# Patient Record
Sex: FEMALE | Race: WHITE | Employment: FULL TIME | URBAN - METROPOLITAN AREA
[De-identification: names, ages, dates, MRNs, and addresses within clinical notes are randomized per-mention and may not be internally consistent; named-entity substitution may affect disease eponyms.]

---

## 2017-04-11 DIAGNOSIS — F41.9 ANXIETY: ICD-10-CM

## 2017-04-11 RX ORDER — FLUOXETINE 20 MG/1
40 TABLET ORAL DAILY
Qty: 180 TAB | Refills: 3 | Status: SHIPPED | OUTPATIENT
Start: 2017-04-11 | End: 2018-11-08

## 2017-05-01 DIAGNOSIS — F41.9 ANXIETY: ICD-10-CM

## 2017-05-02 RX ORDER — LORAZEPAM 0.5 MG/1
TABLET ORAL
Qty: 20 TAB | Refills: 0 | OUTPATIENT
Start: 2017-05-02 | End: 2018-02-22 | Stop reason: ALTCHOICE

## 2017-05-02 NOTE — TELEPHONE ENCOUNTER
From: Huan Mo  To: Rachna Marks MD  Sent: 5/1/2017 10:09 PM EDT  Subject: Medication Renewal Request    Original authorizing provider: MD Huan Aguilar would like a refill of the following medications:  LORazepam (ATIVAN) 0.5 mg tablet Rachna Marks MD]    Preferred pharmacy: [facility]/PHARMACY #[id number]- Jade Case, [address]:

## 2017-05-03 NOTE — TELEPHONE ENCOUNTER
Verbal order per Dr. Marko Fry for calling in medications   Requested Prescriptions     Signed Prescriptions Disp Refills    LORazepam (ATIVAN) 0.5 mg tablet 20 Tab 0     Sig: Take 1/2 to 1 tablet po bid only as needed     Authorizing Provider: Samantha Reyes         Phone in.

## 2018-02-19 ENCOUNTER — TELEPHONE (OUTPATIENT)
Dept: INTERNAL MEDICINE CLINIC | Age: 23
End: 2018-02-19

## 2018-02-19 NOTE — TELEPHONE ENCOUNTER
Patient would like to speak with the nurse about some forms that need to be completed. The forms are Confirming PCP is informed patient is in a rehab facility and  PCP has the contract. Patient states forms must be signed by the 24rd. Patient was last seen 11/2016  So im not sure if an apt is required or not.  Patient can be reached at [phone]

## 2018-02-22 ENCOUNTER — OFFICE VISIT (OUTPATIENT)
Dept: INTERNAL MEDICINE CLINIC | Age: 23
End: 2018-02-22

## 2018-02-22 VITALS
OXYGEN SATURATION: 94 % | DIASTOLIC BLOOD PRESSURE: 79 MMHG | WEIGHT: 192.5 LBS | HEIGHT: 65 IN | TEMPERATURE: 99.4 F | RESPIRATION RATE: 18 BRPM | SYSTOLIC BLOOD PRESSURE: 112 MMHG | HEART RATE: 95 BPM | BODY MASS INDEX: 32.07 KG/M2

## 2018-02-22 DIAGNOSIS — Z91.030 BEE STING ALLERGY: Primary | ICD-10-CM

## 2018-02-22 RX ORDER — DICYCLOMINE HYDROCHLORIDE 10 MG/1
10 CAPSULE ORAL AS NEEDED
COMMUNITY
End: 2020-01-16 | Stop reason: ALTCHOICE

## 2018-02-22 RX ORDER — FLUOXETINE HYDROCHLORIDE 40 MG/1
CAPSULE ORAL
Refills: 1 | COMMUNITY
Start: 2018-01-23 | End: 2018-02-22 | Stop reason: DRUGHIGH

## 2018-02-22 RX ORDER — ELUXADOLINE 75 MG/1
TABLET, FILM COATED ORAL
Refills: 3 | COMMUNITY
Start: 2017-12-04 | End: 2018-02-22 | Stop reason: DRUGHIGH

## 2018-02-22 RX ORDER — EPINEPHRINE 0.3 MG/.3ML
0.3 INJECTION SUBCUTANEOUS
Qty: 2 SYRINGE | Refills: 1 | Status: SHIPPED | OUTPATIENT
Start: 2018-02-22 | End: 2018-11-13 | Stop reason: ALTCHOICE

## 2018-02-22 RX ORDER — ELUXADOLINE 100 MG/1
TABLET, FILM COATED ORAL
Refills: 3 | COMMUNITY
Start: 2017-12-19 | End: 2018-11-13 | Stop reason: ALTCHOICE

## 2018-02-22 NOTE — PROGRESS NOTES
Chief Complaint   Patient presents with    Medication Evaluation     Pt presnets to get refill on epipen. She has a bee sting allergy. Since last visit she became involved with etoh and opioids. She is working with [facility]  She is very busy with her groups and counseling. Psychiatry  Followed by  dr. Christy Landry an dis taking 60 mg prozac. She sees him monthly. Therapist David nunn followes weekly  iop group therapy  Drug tested randomly  AA groups are also mandatory    November 30  HPMP    Not taking ativan  Not taking psueuophedrine      IBS   She is now on Vibrerzze for Ibs with diarrhea. No se  Kristy Siu NP from gasroenterology sees her  Bentyl as needed        Past Medical History:   Diagnosis Date    Anxiety 2010    Bee sting allergy     severe. [facility]br[facility] at [facility]: kidney disease 11/24/2012    Lyme disease     Musculoskeletal disorder 2009    Dislocated Rt shoulder    Recurrent UTI     hx pyelo. saw urology.   took macrodantin age 16 3 year     Past Surgical History:   Procedure Laterality Date    HX TONSILLECTOMY  2002    SHOULDER SURG [address] UNLISTED  12/2010    torn labrum and tendon repair/right     Social History     Social History    Marital status: SINGLE     Spouse name: N/A    Number of children: N/A    Years of education: N/A     Occupational History    bon 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  EPINEPHrine (EPIPEN 2-NAVEEN) 0.3 mg/0.3 mL injection 0.3 mL by IntraMUSCular route once as needed for up to 1 dose. 2 Syringe 1    FLUoxetine (PROZAC) 20 mg tablet Take 2 Tabs by mouth daily. Indications: GENERALIZED ANXIETY DISORDER (Patient taking differently: Take 60 mg by mouth daily. Indications: Generalized Anxiety Disorder) 180 Tab 3    JUNEL FE 1.5/30, 28, 1.5 mg-30 mcg (21)/75 mg (7) tab TAKE 1 TABLET BY MOUTH EVERY DAY  3     Allergies   Allergen Reactions    Bee Sting Kit Anaphylaxis     And wasps    Cefepime Hives and Swelling    Cephalosporins Anaphylaxis    Ciprofloxacin Anaphylaxis    Rocephin [Ceftriaxone] Hives    Zithromax [Azithromycin] Hives       Review of Systems - General ROS: negative for - chills or fever  Cardiovascular ROS: no chest pain or dyspnea on exertion  Respiratory ROS: no cough, shortness of breath, or wheezing    Visit Vitals    /79 (BP 1 Location: Left arm, BP Patient Position: Sitting)    Pulse 95    Temp 99.4 °F (37.4 °C) (Oral)    Resp 18    Ht 5' 5\" (1.651 m)    Wt 192 lb 8 oz (87.3 kg)    LMP 02/22/2018    SpO2 94%    BMI 32.03 kg/m2     General Appearance:  Well developed, well nourished,alert and oriented x 3, and individual in no acute distress. Ears/Nose/Mouth/Throat:   Hearing grossly normal.         Neck: Supple, no lad, no bruits   Chest:   Lungs clear to auscultation bilaterally. Cardiovascular:  Regular rate and rhythm, S1, S2 normal, no murmur. Abdomen:   Soft, non-tender, bowel sounds are active. Extremities: No edema bilaterally. Skin: Warm and dry, no suspicious lesions                 Diagnoses and all orders for this visit:    1.  Bee sting allergy  refill  -     EPINEPHrine (EPIPEN 2-NAVEEN) 0.3 mg/0.3 mL injection; 0.3 mL by IntraMUSCular route once as needed for up to 1 dose. Pt is particpating in the HPMP  Forms signed and her contract will be scanned to media  Support provided. This note will not be viewable in [address]

## 2018-02-22 NOTE — MR AVS SNAPSHOT
[id number] 
[phone] Patient: Rose Powell MRN: [id number] ONK:[birth year] Visit Information Date & Time Provider Department Dept. Phone Encounter #  
 2/22/2018  2:30 PM Marbin Damon MD [facility] [address] [facility] [id number] Upcoming Health Maintenance Date Due Influenza Age 5 to Adult 8/1/2017 PAP AKA CERVICAL CYTOLOGY 1/1/2018 DTaP/Tdap/Td series (8 - Td) 11/29/2026 Allergies as of 2/22/2018  Review Complete On: 2/22/2018 By: Marbin Damon MD  
  
 Severity Noted Reaction Type Reactions Bee Sting Kit High 11/21/2012    Anaphylaxis And wasps Cefepime High 12/03/2010   Systemic Hives, Swelling Cephalosporins High 11/21/2012    Anaphylaxis Ciprofloxacin High 07/10/2011   Systemic Anaphylaxis Rocephin [Ceftriaxone]  05/14/2012    Hives Zithromax [Azithromycin]  11/14/2015    Hives Current Immunizations  Reviewed on 11/29/2016 Name Date DTaP 5/7/1998, 10/31/1995, 1994, 1994, 1994 HPV 1/19/2009, 9/19/2008, 7/16/2008 Hep A Vaccine 1/19/2009, 7/16/2008 Hep B Vaccine 1994, 1994, 1994 Influenza High Dose Vaccine PF 10/16/2014 Influenza Vaccine 10/1/2017, 10/30/2016 Influenza Vaccine Whole 12/3/2012 MMR 5/7/1998, 8/9/1995 Meningococcal (MCV4) Vaccine 5/31/2012 Meningococcal ACWY Vaccine 3/1/2006 Poliovirus vaccine 5/7/1998, 10/31/1995, 1994, 1994 TB Skin Test (PPD) Intradermal 6/2/2014, 5/21/2013 Tdap 11/29/2016, 3/1/2006 Not reviewed this visit You Were Diagnosed With   
  
 Codes Comments Bee sting allergy    -  Primary ICD-10-CM: Z91.038 
ICD-9-CM: V15.06 Vitals BP Pulse Temp Resp Height(growth percentile) Weight(growth percentile)  112/79 (BP 1 Location: Left arm, BP Patient Position: Sitting) 95 99.4 °F (37.4 °C) (Oral) 18 5' 5\" (1.651 m) 192 lb 8 oz (87.3 kg) LMP SpO2 BMI OB Status Smoking Status 2018 94% 32.03 kg/m2 Having regular periods Never Smoker Vitals History BMI and BSA Data Body Mass Index Body Surface Area 32.03 kg/m 2 2 m 2 Preferred Pharmacy Pharmacy Name Phone [facility]/PHARMACY #[id number]- [address] , [address] [place] [address]  [phone] Your Updated Medication List  
  
   
This list is accurate as of 18  3:43 PM.  Always use your most recent med list.  
  
  
  
  
 BENTYL 10 mg capsule Generic drug:  dicyclomine Take 10 mg by mouth daily as needed. EPINEPHrine 0.3 mg/0.3 mL injection Commonly known as:  EPIPEN 2-NAVEEN  
0.3 mL by IntraMUSCular route once as needed for up to 1 dose. FLUoxetine 20 mg tablet Commonly known as:  PROzac Take 2 Tabs by mouth daily. Indications: GENERALIZED ANXIETY DISORDER JUNEL FE 1.5/30 (28) 1.5 mg-30 mcg (21)/75 mg (7) Tab Generic drug:  norethindrone-ethinyl estradiol-iron TAKE 1 TABLET BY MOUTH EVERY DAY  
  
 VIBERZI 100 mg tablet Generic drug:  eluxadoline TAKE 1 TABLET BY MOUTH TWICE A DAY Prescriptions Sent to Pharmacy Refills EPINEPHrine (EPIPEN 2-NAVEEN) 0.3 mg/0.3 mL injection 1 Si.3 mL by IntraMUSCular route once as needed for up to 1 dose. Class: Normal  
 Pharmacy: [facility]/pharmacy #[id number]- [id number] , [address] [place] [address]  Ph #: [phone] Route: IntraMUSCular Introducing [facility]! Dear Jessy Travis: Thank you for requesting a E[employer] account. Our records indicate that you already have an active Efield account. You can access your account anytime at [email or web address] Did you know that you can access your hospital and ER discharge instructions at any time in Efield? You can also review all of your test results from your hospital stay or ER visit. Additional Information If you have questions, please visit the Frequently Asked Questions section of the [employer] website at [email or web address] Remember, [employer] is NOT to be used for urgent needs. For medical emergencies, dial 911. Now available from your iPhone and Android! Please provide this summary of care documentation to your next provider. Your primary care clinician is listed as Olivia Simon. If you have any questions after today's visit, please call [phone].

## 2018-02-24 ENCOUNTER — APPOINTMENT (OUTPATIENT)
Dept: GENERAL RADIOLOGY | Age: 23
End: 2018-02-24
Attending: EMERGENCY MEDICINE
Payer: COMMERCIAL

## 2018-02-24 ENCOUNTER — HOSPITAL ENCOUNTER (EMERGENCY)
Age: 23
Discharge: HOME | End: 2018-02-24
Attending: EMERGENCY MEDICINE
Payer: COMMERCIAL

## 2018-02-24 VITALS
HEIGHT: 64 IN | RESPIRATION RATE: 16 BRPM | SYSTOLIC BLOOD PRESSURE: 117 MMHG | WEIGHT: 190 LBS | HEART RATE: 88 BPM | DIASTOLIC BLOOD PRESSURE: 69 MMHG | BODY MASS INDEX: 32.44 KG/M2 | OXYGEN SATURATION: 98 % | TEMPERATURE: 98.3 F

## 2018-02-24 DIAGNOSIS — M25.522 LEFT ELBOW PAIN: Primary | ICD-10-CM

## 2018-02-24 PROCEDURE — A4565 SLINGS: HCPCS

## 2018-02-24 PROCEDURE — 73080 X-RAY EXAM OF ELBOW: CPT

## 2018-02-24 PROCEDURE — 77030028224 HC PDNG CST BSNM -A

## 2018-02-24 PROCEDURE — 74011250637 HC RX REV CODE- 250/637: Performed by: EMERGENCY MEDICINE

## 2018-02-24 PROCEDURE — 75810000053 HC SPLINT APPLICATION

## 2018-02-24 PROCEDURE — 99283 EMERGENCY DEPT VISIT LOW MDM: CPT

## 2018-02-24 RX ORDER — IBUPROFEN 600 MG/1
600 TABLET ORAL
Status: COMPLETED | OUTPATIENT
Start: 2018-02-24 | End: 2018-02-24

## 2018-02-24 RX ORDER — IBUPROFEN 600 MG/1
600 TABLET ORAL
Qty: 30 TAB | Refills: 0 | Status: SHIPPED | OUTPATIENT
Start: 2018-02-24 | End: 2019-05-10

## 2018-02-24 RX ORDER — NORETHINDRONE ACETATE AND ETHINYL ESTRADIOL .03; 1.5 MG/1; MG/1
1 TABLET ORAL
COMMUNITY
End: 2019-09-25 | Stop reason: ALTCHOICE

## 2018-02-24 RX ADMIN — IBUPROFEN 600 MG: 600 TABLET, FILM COATED ORAL at 12:43

## 2018-02-24 NOTE — DISCHARGE INSTRUCTIONS
Musculoskeletal Pain: Care Instructions  Your Care Instructions    Different problems with the bones, muscles, nerves, ligaments, and tendons in the body can cause pain. One or more areas of your body may ache or burn. Or they may feel tired, stiff, or sore. The medical term for this type of pain is musculoskeletal pain. It can have many different causes. Sometimes the pain is caused by an injury such as a strain or sprain. Or you might have pain from using one part of your body in the same way over and over again. This is called overuse. In some cases, the cause of the pain is another health problem such as arthritis or fibromyalgia. The doctor will examine you and ask you questions about your health to help find the cause of your pain. Blood tests or imaging tests like an X-ray may also be helpful. But sometimes doctors can't find a cause of the pain. Treatment depends on your symptoms and the cause of the pain, if known. The doctor has checked you carefully, but problems can develop later. If you notice any problems or new symptoms, get medical treatment right away. Follow-up care is a key part of your treatment and safety. Be sure to make and go to all appointments, and call your doctor if you are having problems. It's also a good idea to know your test results and keep a list of the medicines you take. How can you care for yourself at home? · Rest until you feel better. · Do not do anything that makes the pain worse. Return to exercise gradually if you feel better and your doctor says it's okay. · Be safe with medicines. Read and follow all instructions on the label. ¨ If the doctor gave you a prescription medicine for pain, take it as prescribed. ¨ If you are not taking a prescription pain medicine, ask your doctor if you can take an over-the-counter medicine. · Put ice or a cold pack on the area for 10 to 20 minutes at a time to ease pain.  Put a thin cloth between the ice and your skin.  When should you call for help? Call your doctor now or seek immediate medical care if:  ? · You have new pain, or your pain gets worse. ? · You have new symptoms such as a fever, a rash, or chills. ? Watch closely for changes in your health, and be sure to contact your doctor if:  ? · You do not get better as expected. Where can you learn more? Go to http://jaqui-anna.info/. Enter G626 in the search box to learn more about \"Musculoskeletal Pain: Care Instructions. \"  Current as of: October 14, 2016  Content Version: 11.4  © 1773-6200 [employer]. Care instructions adapted under license by [employer] (which disclaims liability or warranty for this information). If you have questions about a medical condition or this instruction, always ask your healthcare professional. [place] [address] any warranty or liability for your use of this information. We hope that we have addressed all of your medical concerns. The examination and treatment you received in the Emergency Department were for an emergent problem and were not intended as complete care. It is important that you follow up with your healthcare provider(s) for ongoing care. If your symptoms worsen or do not improve as expected, and you are unable to reach your usual health care provider(s), you should return to the Emergency Department. Today's healthcare is undergoing tremendous change, and patient satisfaction surveys are one of the many tools to assess the quality of medical care. You may receive a survey from the [employer] organization regarding your experience in the Emergency Department. I hope that your experience has been completely positive, particularly the medical care that I provided. As such, please participate in the survey; anything less than excellent does not meet my expectations or intentions.         [facility] and [facility] participate in nationally recognized quality of care measures. If your blood pressure is greater than 120/80, as reported below, we urge that you seek medical care to address the potential of high blood pressure, commonly known as hypertension. Hypertension can be hereditary or can be caused by certain medical conditions, pain, stress, or \"white coat syndrome. \"       Please make an appointment with your health care provider(s) for follow up of your Emergency Department visit. VITALS:   Patient Vitals for the past 8 hrs:   Temp Pulse Resp BP SpO2   02/24/18 1138 98.3 °F (36.8 °C) (!) 122 16 134/86 100 %          Thank you for allowing us to provide you with medical care today. We realize that you have many choices for your emergency care needs. Please choose us in the future for any continued health care needs. [facility], [address]: [phone]            No results found for this or any previous visit (from the past 24 hour(s)). Xr Elbow Lt Min 3 V    Result Date: 2/24/2018  EXAM:  XR ELBOW LT MIN 3 V INDICATION:   fall. COMPARISON: None. FINDINGS: Three views of the left elbow demonstrate no fracture, dislocation, effusion or other acute abnormality. IMPRESSION: No acute abnormality.

## 2018-02-24 NOTE — ED TRIAGE NOTES
Pt fell off a horse about 25 min ago, injuring left elbow. Pt hit head and had a helmet on, no crack in helmet. No LOC. Hx of previously broken left elbow in high school.  Pt is 3 months sober and requests no opioids

## 2018-02-24 NOTE — ED PROVIDER NOTES
Patient is a 20 y.o. female presenting with elbow pain. Elbow Pain    Associated symptoms include numbness. Pertinent negatives include no neck pain. 19 yo WF presents after fall from horse about 30 min ago. She was on jump when the horse slipped and she fell off. States she hit her head, no LOC, had a helmet on. Landed on left elbow. Pt is right handed. Felt ok, then got back on horse and elbow with worsening pain, denies weakness. C/o mild tingling to left hand. Hx of left elbow fx. Hx of opoid abuse. Past Medical History:   Diagnosis Date    Anxiety 2010    Bee sting allergy     [facility]. [facility] Herbert Sprague [facility]: kidney disease 11/24/2012    Lyme disease     Musculoskeletal disorder 2009    Dislocated Rt shoulder    Recurrent UTI     hx pyelo. saw urology. took macrodantin age [age] year       Past Surgical History:   Procedure Laterality Date    HX TONSILLECTOMY  2002    SHOULDER SURG PROC UNLISTED Right 12/2010    torn labrum and tendon repair/right         Family History:   Problem Relation Age of Onset    Anxiety Mother     Kidney Disease Paternal Grandmother      also in cousins   [place] Cancer Maternal Grandmother      skin       Social History     Social History    Marital status: SINGLE     Spouse name: N/A    Number of children: N/A    Years of education: N/A     Occupational History    bon [employer]      Social History Main Topics    Smoking status: Never Smoker    Smokeless tobacco: Never Used    Alcohol use No      Comment: no ETOH since 12/1/2017    Drug use: Yes     Special: Opiates      Comment: sober since 12/1/2017    Sexual activity: Yes     Birth control/ protection: Pill     Other Topics Concern    Not on file     Social History Narrative    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 Respiratory: Negative for cough and shortness of breath. Cardiovascular: Negative for chest pain. Gastrointestinal: Negative for diarrhea, nausea and vomiting. Musculoskeletal: Positive for arthralgias. Negative for neck pain and neck stiffness. Skin: Negative for rash and wound. Neurological: Positive for numbness. Negative for syncope, weakness and headaches. All other systems reviewed and are negative. There were no vitals filed for this visit.          Physical Exam   Physical Examination: General appearance - alert, well appearing, and in no distress, oriented to person, place, and time and normal appearing weight  Eyes - pupils equal and reactive, extraocular eye movements intact  Neck - supple, no significant adenopathy  Chest - clear to auscultation, no wheezes, rales or rhonchi, symmetric air entry  Heart - normal rate, regular rhythm, normal S1, S2, no murmurs, rubs, clicks or gallops  Abdomen - soft, nontender, nondistended, no masses or organomegaly  Back exam - full range of motion, no tenderness, palpable spasm or pain on motion  Neurological - alert, oriented, normal speech, no focal findings or movement disorder noted  Musculoskeletal - no deformity to left elbow, tenderness to palpation to posterior left elbow, limited rom due to pain, NVI distally with strong radial pulses, scars to left AC from previous drug use (pt admits to this)  Extremities - peripheral pulses normal, no pedal edema, no clubbing or cyanosis  Skin - normal coloration and turgor, no rashes, no suspicious skin lesions noted  MDM  Number of Diagnoses or Management Options  Left elbow pain:      Amount and/or Complexity of Data Reviewed  Tests in the radiology section of CPT®: ordered and reviewed  Decide to obtain previous medical records or to obtain history from someone other than the patient: yes  Obtain history from someone other than the patient: yes (mother)  Review and summarize past medical records: yes  Independent visualization of images, tracings, or specimens: yes    Patient Progress  Patient progress: stable        ED Course       Procedures  No fx on xray. Pt still with pain. Posterior long arm splint applied by nursing. NVI after placement. F/u with ortho or return to ED for worsening symptoms.

## 2018-10-16 ENCOUNTER — OFFICE VISIT (OUTPATIENT)
Dept: INTERNAL MEDICINE CLINIC | Age: 23
End: 2018-10-16

## 2018-10-16 DIAGNOSIS — F41.9 ANXIETY: ICD-10-CM

## 2018-10-16 DIAGNOSIS — F33.2 SEVERE EPISODE OF RECURRENT MAJOR DEPRESSIVE DISORDER, WITHOUT PSYCHOTIC FEATURES (HCC): Primary | ICD-10-CM

## 2018-10-16 RX ORDER — BUSPIRONE HYDROCHLORIDE 5 MG/1
5 TABLET ORAL
Qty: 90 TAB | Refills: 0 | Status: SHIPPED | OUTPATIENT
Start: 2018-10-16 | End: 2018-10-29

## 2018-10-16 NOTE — PROGRESS NOTES
All health maintenance and other pertinent information has been reviewed in preparation for today's office visit. Patient presents in the office today for: Chief Complaint Patient presents with  Hypertension  Anxiety 1. Have you been to the ER, urgent care clinic since your last visit? Hospitalized since your last visit? No 
 
2. Have you seen or consulted any other health care providers outside of the [address] [place] [address] since your last visit? Include any pap smears or colon screening.  No

## 2018-10-16 NOTE — PROGRESS NOTES
Chief Complaint Patient presents with  Hypertension  Anxiety Depression Patient complains of depression. She complains of depressed mood, anhedonia, weight gain, hypersomnia, psychomotor retardation, fatigue, feelings of worthlessness/guilt, difficulty concentrating, hopelessness, impaired memory, recurrent thoughts of death and suicidal thoughts without plan. Onset was approximately several months ago, gradually worsening since that time. He denies current suicidal and homicidal plan or intent. Family history significant for nothing. Possible organic causes contributing are: None. Risk factors: negative life event patient has a previous history of opioid use related to her prior job. She went to [facility] and turned herself in. She has been on a treatment plan but feels there is an emphasis on substance abuse but not addressing depression and anxiety. She was given an opportunity to work at [employer] however she started to take IV Benadryl because her depression was still there. She is taking 80 mg of Prozac and she is not allowed to take any other medications for her the health practitioner program stipulations. Previous treatment includes SSRI and individual therapy. She complains of the following side effects from the treatment: weight gain. I may need to add some Wellbutrin as well. I will also do some screenings for her next visit. anxiety Patient reports her anxiety has become progressively worse. She is seeing a counselor. She has significant fatigue. Her symptoms have been going on for more than 6 months. The Prozac is not helping. Blood pressure without diagnosis of hypertension Patient reports her blood pressure was 140/120 when she was being evaluated. She has not been sleeping she does not have chest pain or shortness of breath. Past Medical History:  
Diagnosis Date  Anxiety 2010  Bee sting allergy   
 severe. Dr. Gurmeet Do[facility]  FH: kidney disease 11/24/2012  Lyme disease  Musculoskeletal disorder 2009 Dislocated Rt shoulder  Recurrent UTI   
 hx pyelo. saw urology. took macrodantin age [age] year Past Surgical History:  
Procedure Laterality Date  HX TONSILLECTOMY  2002  SHOULDER SURG PROC UNLISTED Right 12/2010  
 torn labrum and tendon repair/right Social History Social History  Marital status: SINGLE Spouse name: N/A  
 Number of children: N/A  
 Years of education: N/A Occupational History  [employer] Social History Main Topics  Smoking status: Never Smoker  Smokeless tobacco: Never Used  Alcohol use No  
   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   
  also in cousins  Cancer Maternal Grandmother   
  skin Current Outpatient Prescriptions Medication Sig Dispense Refill  busPIRone (BUSPAR) 5 mg tablet Take 1 Tab by mouth three (3) times daily (with meals). 90 Tab 0  
 norethindrone ac-eth estradiol (BETO 1.5/30, 21,) 1.5-30 mg-mcg tab Take  by mouth.  VIBERZI 100 mg tablet TAKE 1 TABLET BY MOUTH TWICE A DAY  3  
 dicyclomine (BENTYL) 10 mg capsule Take 10 mg by mouth daily as needed.  FLUoxetine (PROZAC) 20 mg tablet Take 2 Tabs by mouth daily. Indications: GENERALIZED ANXIETY DISORDER (Patient taking differently: Take 60 mg by mouth daily. Indications: Generalized Anxiety Disorder) 180 Tab 3  ibuprofen (MOTRIN) 600 mg tablet Take 1 Tab by mouth every eight (8) hours as needed for Pain. 30 Tab 0  
 EPINEPHrine (EPIPEN 2-NAVEEN) 0.3 mg/0.3 mL injection 0.3 mL by IntraMUSCular route once as needed for up to 1 dose. 2 Syringe 1 Allergies Allergen Reactions  Bee Sting Kit Anaphylaxis And wasps  Cefepime Hives and Swelling  Cephalosporins Anaphylaxis  Ciprofloxacin Anaphylaxis  Rocephin [Ceftriaxone] Hives  Zithromax [Azithromycin] Hives Review of Systems - General ROS: positive for  - fatigue, malaise, sleep disturbance and weight gain 
negative for - fever or hot flashes Cardiovascular ROS: no chest pain or dyspnea on exertion Respiratory ROS: no cough, shortness of breath, or wheezing There were no vitals taken for this visit. General Appearance:  Well developed, well nourished,alert and oriented x 3, and individual in no acute distress. Ears/Nose/Mouth/Throat:   Hearing grossly normal. 
  
    Neck: Supple, no lad, no bruits Chest:   Lungs clear to auscultation bilaterally. Cardiovascular:  Regular rate and rhythm, S1, S2 normal, no murmur. Abdomen:   Soft, non-tender, bowel sounds are active. Extremities: No edema bilaterally. Skin: Warm and dry, no suspicious lesions Diagnoses and all orders for this visit: 1. Severe episode of recurrent major depressive disorder, without psychotic features ([facility] 
-     CBC W/O DIFF 
-     TSH 3RD GENERATION 
-     VITAMIN D, 25 HYDROXY 
-     METABOLIC PANEL, COMPREHENSIVE 
-     REFERRAL TO PSYCHIATRY 
-     REFERRAL TO PSYCHOLOGY Other orders -     busPIRone (BUSPAR) 5 mg tablet; Take 1 Tab by mouth three (3) times daily (with meals). I spent 40 min with this patient and >50% of the time was spent on counseling and management of major depression and anxiety. I do not think these are optimally controlled in fact they have gotten progressively worse since I have last seen her.   Her patient report it appears there is more emphasis on substance abuse treatment more so than depression and anxiety. I think that her depression and anxiety needs to be treated as part of her substance abuse treatments. I discussed with her that she may need to see a substance abuse counselor as well as another counselor for her depression and anxiety. I have given her mother some names Dr. Aldo Ott for psychiatry and Marlene Gasca for psychology. I also discussed with her that possibly she can see Alix Albright with our group as well. She will follow-up next week. She contracts with me to not hurt herself or anyone else. Her mother is staying with her. This note will not be viewable in [address]

## 2018-10-16 NOTE — LETTER
10/17/2018 8:10 PM 
 
Ms. Marlena Bence [address] [facility] [id number] Dear Marlena Bence: 
 
Please find your most recent results below. Resulted Orders CBC W/O DIFF Result Value Ref Range WBC 11.1 (H) 3.4 - 10.8 x10E3/uL  
 RBC 4.54 3.77 - 5.28 x10E6/uL HGB 13.2 11.1 - 15.9 g/dL HCT 38.8 34.0 - 46.6 % MCV 86 79 - 97 fL  
 MCH 29.1 26.6 - 33.0 pg  
 MCHC 34.0 31.5 - 35.7 g/dL  
 RDW 13.5 12.3 - 15.4 % PLATELET 806 (H) 222 - 379 x10E3/uL Narrative Performed at:  [address]  [id number] : Reji Vega MD, Phone:  [phone] TSH 3RD GENERATION Result Value Ref Range TSH 1.890 0.450 - 4.500 uIU/mL Narrative Performed at:  [address]  [id number] : Reji Vega MD, Phone:  [phone] VITAMIN D, 25 HYDROXY Result Value Ref Range VITAMIN D, 25-HYDROXY 29.6 (L) 30.0 - 100.0 ng/mL Comment:  
   Vitamin D deficiency has been defined by the [facility] practice guideline as a 
level of serum 25-OH vitamin D less than 20 ng/mL (1,2). The Endocrine Society went on to further define vitamin D 
insufficiency as a level between 21 and 29 ng/mL (2). 1. IOM ([place] of [employer]). 2010. Dietary reference 
   intakes for calcium and D. [facility]: The 
   NextIO. 2. Karyna MF, Jasper NC, Nan CARY, et al. 
   Evaluation, treatment, and prevention of vitamin D 
   deficiency: an Endocrine Society clinical practice 
   guideline. JCEM. 2011 Jul; 96(7):1911-30. Narrative Performed at:  [address]  [id number] : Reji Vega MD, Phone:  [phone] METABOLIC PANEL, COMPREHENSIVE Result Value Ref Range Glucose 96 65 - 99 mg/dL BUN 12 6 - 20 mg/dL Creatinine 0.64 0.57 - 1.00 mg/dL GFR est non- >59 mL/min/1.73 GFR est  >59 mL/min/1.73  
 BUN/Creatinine ratio 19 9 - 23 Sodium 137 134 - 144 mmol/L Potassium 4.2 3.5 - 5.2 mmol/L Chloride 101 96 - 106 mmol/L  
 CO2 19 (L) 20 - 29 mmol/L Calcium 9.9 8.7 - 10.2 mg/dL Protein, total 7.3 6.0 - 8.5 g/dL Albumin 4.5 3.5 - 5.5 g/dL GLOBULIN, TOTAL 2.8 1.5 - 4.5 g/dL A-G Ratio 1.6 1.2 - 2.2 Bilirubin, total 0.4 0.0 - 1.2 mg/dL Alk. phosphatase 64 39 - 117 IU/L  
 AST (SGOT) 29 0 - 40 IU/L  
 ALT (SGPT) 22 0 - 32 IU/L Narrative Performed at:  [address]  [id number] : Felice Damon MD, Phone:  [phone] RECOMMENDATIONS: 
Thank you for coming in for your visit. Your labs overall look good. I will go over them next week with you too. Please take over the counter vitamin d3 2000 iu daily. Take care and I'll see you soon. Jbk Sincerely, Brandyn Ayala MD

## 2018-10-16 NOTE — MR AVS SNAPSHOT
[address] [place], [place] [address] [address] [place] [address] [facility] 
[phone] Patient: Alisia Lorenzo MRN: [id number] VAA:[id number]/[id number] Visit Information Date & Time Provider Department Dept. Phone Encounter #  
 10/16/2018  2:00 PM Robinson Gallego MD [facility] [address]  [id number] Upcoming Health Maintenance Date Due  
 PAP AKA CERVICAL CYTOLOGY 1/1/2018 Influenza Age 5 to Adult 8/1/2018 DTaP/Tdap/Td series (8 - Td) 11/29/2026 Allergies as of 10/16/2018  Review Complete On: 2/24/2018 By: April JOHN Hercules RN Severity Noted Reaction Type Reactions Bee Sting Kit High 11/21/2012    Anaphylaxis And wasps Cefepime High 12/03/2010   Systemic Hives, Swelling Cephalosporins High 11/21/2012    Anaphylaxis Ciprofloxacin High 07/10/2011   Systemic Anaphylaxis Rocephin [Ceftriaxone]  05/14/2012    Hives Zithromax [Azithromycin]  11/14/2015    Hives Current Immunizations  Reviewed on 11/29/2016 Name Date DTaP 5/7/1998, 10/31/1995, 1994, 1994, 1994 HPV 1/19/2009, 9/19/2008, 7/16/2008 Hep A Vaccine 1/19/2009, 7/16/2008 Hep B Vaccine 1994, 1994, 1994 Influenza High Dose Vaccine PF 10/16/2014 Influenza Vaccine 10/1/2017, 10/30/2016 Influenza Vaccine Whole 12/3/2012 MMR 5/7/1998, 8/9/1995 Meningococcal (MCV4) Vaccine 5/31/2012 Meningococcal ACWY Vaccine 3/1/2006 Poliovirus vaccine 5/7/1998, 10/31/1995, 1994, 1994 TB Skin Test (PPD) Intradermal 6/2/2014, 5/21/2013 Tdap 11/29/2016, 3/1/2006 Not reviewed this visit You Were Diagnosed With   
  
 Codes Comments Severe episode of recurrent major depressive disorder, without psychotic features ([facility]    -  Primary ICD-10-CM: F33.2 ICD-9-CM: 296.33 Vitals OB Status Smoking Status Having regular periods Never Smoker Preferred Pharmacy Pharmacy Name Phone [facility]/PHARMACY #[id number]- [id number] [facility], [address] [place] [address]  [phone] Your Updated Medication List  
  
   
This list is accurate as of 10/16/18  3:54 PM.  Always use your most recent med list.  
  
  
  
  
 BENTYL 10 mg capsule Generic drug:  dicyclomine Take 10 mg by mouth daily as needed. busPIRone 5 mg tablet Commonly known as:  BUSPAR Take 1 Tab by mouth three (3) times daily (with meals). EPINEPHrine 0.3 mg/0.3 mL injection Commonly known as:  EPIPEN 2-NAVEEN  
0.3 mL by IntraMUSCular route once as needed for up to 1 dose. FLUoxetine 20 mg tablet Commonly known as:  PROzac Take 2 Tabs by mouth daily. Indications: GENERALIZED ANXIETY DISORDER  
  
 ibuprofen 600 mg tablet Commonly known as:  MOTRIN Take 1 Tab by mouth every eight (8) hours as needed for Pain. Desi Noss 1.5/30 (21) 1.5-30 mg-mcg Tab Generic drug:  norethindrone ac-eth estradiol Take  by mouth. VIBERZI 100 mg tablet Generic drug:  eluxadoline TAKE 1 TABLET BY MOUTH TWICE A DAY Prescriptions Sent to Pharmacy Refills  
 busPIRone (BUSPAR) 5 mg tablet 0 Sig: Take 1 Tab by mouth three (3) times daily (with meals). Class: Normal  
 Pharmacy: [facility]/pharmacy #[id number]- [id number]  Ph #: [phone] Route: Oral  
  
We Performed the Following CBC W/O DIFF [82380 CPT(R)] METABOLIC PANEL, COMPREHENSIVE [93679 CPT(R)] REFERRAL TO PSYCHIATRY [REF91 Custom] REFERRAL TO PSYCHOLOGY [SPY68 Custom] Comments:  
 [place] TSH 3RD GENERATION [15623 CPT(R)] VITAMIN D, 25 HYDROXY [id number] CPT(R)] Referral Information Referral ID Referred By Referred To  
  
 [id number] Roya Grande MD   
   
 Visits Status Start Date End Date 1 New Request 10/16/18 10/16/19  If your referral has a status of pending review or denied, additional information will be sent to support the outcome of this decision. Referral ID Referred By Referred To  
 [id number] Renate Chaudhry DANIEL Not Available Visits Status Start Date End Date 1 New Request 10/16/18 10/16/19 If your referral has a status of pending review or denied, additional information will be sent to support the outcome of this decision. Introducing [facility]! Dear Camille Sullivan: Thank you for requesting a [employer] account. Our records indicate that you already have an active CleveX account. You can access your account anytime at [email or web address] Did you know that you can access your hospital and ER discharge instructions at any time in Cle[employer]? You can also review all of your test results from your hospital stay or ER visit. Additional Information If you have questions, please visit the Frequently Asked Questions section of the [employer] website at [email or web address] Remember, Cle[employer]X is NOT to be used for urgent needs. For medical emergencies, dial 911. Now available from your iPhone and Android! Please provide this summary of care documentation to your next provider. Your primary care clinician is listed as Marcelo Anderson. If you have any questions after today's visit, please call [phone].

## 2018-10-17 LAB
25(OH)D3+25(OH)D2 SERPL-MCNC: 29.6 NG/ML (ref 30–100)
ALBUMIN SERPL-MCNC: 4.5 G/DL (ref 3.5–5.5)
ALBUMIN/GLOB SERPL: 1.6 {RATIO} (ref 1.2–2.2)
ALP SERPL-CCNC: 64 IU/L (ref 39–117)
ALT SERPL-CCNC: 22 IU/L (ref 0–32)
AST SERPL-CCNC: 29 IU/L (ref 0–40)
BILIRUB SERPL-MCNC: 0.4 MG/DL (ref 0–1.2)
BUN SERPL-MCNC: 12 MG/DL (ref 6–20)
BUN/CREAT SERPL: 19 (ref 9–23)
CALCIUM SERPL-MCNC: 9.9 MG/DL (ref 8.7–10.2)
CHLORIDE SERPL-SCNC: 101 MMOL/L (ref 96–106)
CO2 SERPL-SCNC: 19 MMOL/L (ref 20–29)
CREAT SERPL-MCNC: 0.64 MG/DL (ref 0.57–1)
ERYTHROCYTE [DISTWIDTH] IN BLOOD BY AUTOMATED COUNT: 13.5 % (ref 12.3–15.4)
GLOBULIN SER CALC-MCNC: 2.8 G/DL (ref 1.5–4.5)
GLUCOSE SERPL-MCNC: 96 MG/DL (ref 65–99)
HCT VFR BLD AUTO: 38.8 % (ref 34–46.6)
HGB BLD-MCNC: 13.2 G/DL (ref 11.1–15.9)
MCH RBC QN AUTO: 29.1 PG (ref 26.6–33)
MCHC RBC AUTO-ENTMCNC: 34 G/DL (ref 31.5–35.7)
MCV RBC AUTO: 86 FL (ref 79–97)
PLATELET # BLD AUTO: 387 X10E3/UL (ref 150–379)
POTASSIUM SERPL-SCNC: 4.2 MMOL/L (ref 3.5–5.2)
PROT SERPL-MCNC: 7.3 G/DL (ref 6–8.5)
RBC # BLD AUTO: 4.54 X10E6/UL (ref 3.77–5.28)
SODIUM SERPL-SCNC: 137 MMOL/L (ref 134–144)
TSH SERPL DL<=0.005 MIU/L-ACNC: 1.89 UIU/ML (ref 0.45–4.5)
WBC # BLD AUTO: 11.1 X10E3/UL (ref 3.4–10.8)

## 2018-10-29 ENCOUNTER — OFFICE VISIT (OUTPATIENT)
Dept: INTERNAL MEDICINE CLINIC | Age: 23
End: 2018-10-29

## 2018-10-29 VITALS
DIASTOLIC BLOOD PRESSURE: 79 MMHG | TEMPERATURE: 98.6 F | BODY MASS INDEX: 39.78 KG/M2 | SYSTOLIC BLOOD PRESSURE: 112 MMHG | OXYGEN SATURATION: 97 % | HEART RATE: 118 BPM | RESPIRATION RATE: 18 BRPM | HEIGHT: 64 IN | WEIGHT: 233 LBS

## 2018-10-29 DIAGNOSIS — F11.11 HISTORY OF OPIOID ABUSE (HCC): ICD-10-CM

## 2018-10-29 DIAGNOSIS — F41.9 ANXIETY: ICD-10-CM

## 2018-10-29 DIAGNOSIS — F33.1 MODERATE EPISODE OF RECURRENT MAJOR DEPRESSIVE DISORDER (HCC): Primary | ICD-10-CM

## 2018-10-29 PROBLEM — E66.01 SEVERE OBESITY (HCC): Status: ACTIVE | Noted: 2018-10-29

## 2018-10-29 RX ORDER — BUSPIRONE HYDROCHLORIDE 10 MG/1
10 TABLET ORAL
Qty: 90 TAB | Refills: 0 | Status: SHIPPED | OUTPATIENT
Start: 2018-10-29 | End: 2018-11-24 | Stop reason: SDUPTHER

## 2018-10-29 NOTE — PROGRESS NOTES
Chief Complaint Patient presents with  Medication Evaluation Patient presents by herself today. Her mother had to travel but will be home on Wednesday. Depression Patient reports she still has depression. She feels somewhat better than last visit. She reports she has tremendous support at home. She is interested in getting on with her life and working to put her depression anxiety and substance abuse history past her. She is interested in staying in the [facility] program.  She has met with her counselor and had a strong discussion. She will be seeing her counselor twice a week until she can get out of this acute phase. She has a very slight improvement of depressed mood, weight gain, hypersomnia, psychomotor retardation, fatigue, feelings of worthlessness/guilt, difficulty concentrating, hopelessness, impaired memory, recurrent thoughts of death and suicidal thoughts without plan. Background: Onset was approximately several months ago, gradually worsening since that time. He denies current suicidal and homicidal plan or intent. Family history significant for nothing. Possible organic causes contributing are: None. Risk factors: negative life event patient has a previous history of opioid use related to her prior job. She went to [facility] and turned herself in. She has been on a treatment plan but feels there is an emphasis on substance abuse but not addressing depression and anxiety. She was given an opportunity to work at [employer] however she started to take IV Benadryl because her depression was still there. She is taking 80 mg of Prozac and she is not allowed to take any other medications for her the health practitioner program stipulations. Previous treatment includes SSRI and individual therapy. She complains of the following side effects from the treatment: weight gain. I may need to add some Wellbutrin as well. anxiety Patient is on BuSpar 5 mg 3 times a day. She reports it is helping a little bit but she thinks her dose may need to be increased. Blood pressure without diagnosis of hypertension She reports she checked her blood pressure at home and it is in the 120/80 range. She reports her heart rate is elevated because she gets anxious in the office. Past Medical History:  
Diagnosis Date  Anxiety 2010  Bee sting allergy   
 severe. Dr. Reanna Matt at [facility]  FH: kidney disease 11/24/2012  Lyme disease  Musculoskeletal disorder 2009 Dislocated Rt shoulder  Recurrent UTI   
 hx pyelo. saw urology. took macrodantin age 16 3 year Past Surgical History:  
Procedure Laterality Date  HX TONSILLECTOMY  2002  SHOULDER SURG PROC UNLISTED Right 12/2010  
 torn labrum and tendon repair/right Social History Socioeconomic History  Marital status: SINGLE Spouse name: Not on file  Number of children: Not on file  Years of education: Not on file  Highest education level: Not on file Social Needs  Financial resource strain: Not on file  Food insecurity - worry: Not on file  Food insecurity - inability: Not on file  Transportation needs - medical: Not on file  Transportation needs - non-medical: Not on file Occupational History  Occupation: [employer] Tobacco Use  Smoking status: Never Smoker  Smokeless tobacco: Never Used Substance and Sexual Activity  Alcohol use: No  
  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   
     also in cousins  Cancer Maternal Grandmother   
     skin Current Outpatient Medications Medication Sig Dispense Refill  busPIRone (BUSPAR) 10 mg tablet Take 1 Tab by mouth three (3) times daily (with meals). 90 Tab 0  
 naltrexone microspheres (VIVITROL) 380 mg ER injection 380 mg by IntraMUSCular route once for 1 dose. 380 mg 0  
 norethindrone ac-eth estradiol (BETO 1.5/30, 21,) 1.5-30 mg-mcg tab Take  by mouth.  FLUoxetine (PROZAC) 20 mg tablet Take 2 Tabs by mouth daily. Indications: GENERALIZED ANXIETY DISORDER (Patient taking differently: Take 80 mg by mouth daily. ) 180 Tab 3  ibuprofen (MOTRIN) 600 mg tablet Take 1 Tab by mouth every eight (8) hours as needed for Pain. 30 Tab 0  
 VIBERZI 100 mg tablet TAKE 1 TABLET BY MOUTH TWICE A DAY  3  
 dicyclomine (BENTYL) 10 mg capsule Take 10 mg by mouth four (4) times daily.  EPINEPHrine (EPIPEN 2-NAVEEN) 0.3 mg/0.3 mL injection 0.3 mL by IntraMUSCular route once as needed for up to 1 dose. 2 Syringe 1 Allergies Allergen Reactions  Bee Sting Kit Anaphylaxis And wasps  Cefepime Hives and Swelling  Cephalosporins Anaphylaxis  Ciprofloxacin Anaphylaxis  Rocephin [Ceftriaxone] Hives  Zithromax [Azithromycin] Hives Review of Systems - General ROS: positive for  - fatigue, malaise, sleep disturbance and weight gain 
negative for - fever or hot flashes Cardiovascular ROS: no chest pain or dyspnea on exertion Respiratory ROS: no cough, shortness of breath, or wheezing Visit Vitals /79 (BP 1 Location: Left arm, BP Patient Position: Sitting) Pulse (!) 118 Temp 98.6 °F (37 °C) (Oral) Resp 18 Ht 5' 4\" (1.626 m) Wt 233 lb (105.7 kg) LMP 10/28/2018 SpO2 97% BMI 39.99 kg/m² General Appearance:  Well developed, well nourished,alert and oriented x 3, and individual in no acute distress.   
Ears/Nose/Mouth/Throat:   Hearing grossly normal. 
  
 Neck: Supple, no lad, no bruits Chest:   Lungs clear to auscultation bilaterally. Cardiovascular:  Regular rate and rhythm, S1, S2 normal, no murmur. Abdomen:   Soft, non-tender, bowel sounds are active. Extremities: No edema bilaterally. Skin: Warm and dry, no suspicious lesions Diagnoses and all orders for this visit: 
 
1. Moderate episode of recurrent major depressive disorder (HCC) Continue current medication Follow-up with counselor 2. Anxiety Not optimally controlled Will increase her BuSpar to 10 mg 3 times a day Follow-up with counselor Follow-up with me in 2 weeks 3. History of opioid abuse Patient is part of the [place] practitioner program.  She is interested in getting naltrexone IM. She needs to have a witness that she is taking. If she takes oral naltrexone she will need a witness. She would like to do the IM so that we could document given once a month. Side effects discussed regarding the naltrexone. This will likely help with some weight loss as well. Other orders -     busPIRone (BUSPAR) 10 mg tablet; Take 1 Tab by mouth three (3) times daily (with meals). -     naltrexone microspheres (VIVITROL) 380 mg ER injection; 380 mg by IntraMUSCular route once for 1 dose. This note will not be viewable in [address]

## 2018-10-29 NOTE — PATIENT INSTRUCTIONS
Naltrexone (By injection) Naltrexone (nal-TREX-one) Helps prevent alcohol or drug abuse relapse. Brand Name(s): Vivitrol There may be other brand names for this medicine. When This Medicine Should Not Be Used: This medicine is not right for everyone. You should not receive it if you had an allergic reaction to naltrexone, jspwwhsxovp-is-xircduhwk (PLG), or carboxymethylcellulose. How to Use This Medicine:  
Injectable · Your doctor will prescribe your exact dose. A nurse or other health provider will give you this medicine as a shot into your buttocks muscle. It is usually given every 4 weeks. · Missed dose: If you miss your scheduled dose, call to make another appointment as soon as possible. · This medicine should come with a Medication Guide. Ask your pharmacist for a copy if you do not have one. Drugs and Foods to Avoid: Ask your doctor or pharmacist before using any other medicine, including over-the-counter medicines, vitamins, and herbal products. · Any prescription narcotic medicine, such as cough syrup, will not work well while you are being treated with this medicine. · Do not drink alcohol while you are using this medicine. Warnings While Using This Medicine: · Make sure your doctor knows if you are pregnant or breastfeeding, or if you have kidney problems, liver problems, lung or breathing problems, low platelets in the blood, or bleeding problems such as hemophilia. · You have a higher risk of accidental overdose, serious injury, or death if you use heroin or any other narcotic medicine while you are being treated with naltrexone. Also, naltrexone prevents you from feeling the effects of heroin if you use it. · Call your doctor if you have a reaction to the shot that does not go away or gets worse within 2 weeks after you get the shot. · This medicine can cause serious liver problems or eosinophilic pneumonia. · This medicine may increase thoughts of suicide. Tell your doctor right away if you start to feel depressed or have thoughts about hurting yourself. · Do not drive, use machines, or do anything else that could be dangerous until you know how this medicine affects you. · Tell any doctor or dentist who treats you that you are using this medicine. This medicine may affect certain medical test results. You may want to carry a card or a paper with you that says you are receiving this medicine. Possible Side Effects While Using This Medicine:  
Call your doctor right away if you notice any of these side effects: · Allergic reaction: Itching or hives, swelling in your face or hands, swelling or tingling in your mouth or throat, chest tightness, trouble breathing · Anxiety, depression, or unusual thoughts and behaviors · Chest pain or trouble breathing · Dark urine or pale stools, nausea, vomiting, loss of appetite, pain in your upper stomach, yellow skin or eyes · Dizziness, drowsiness, or fainting · Fast, slow, or pounding heartbeat · Fever, chills, cough, runny or stuffy nose, sore throat, and body aches · Pain, itching, burning, swelling, or a lump under your skin where the shot was given · Trouble sleeping, getting upset easily, a big increase in energy, or reckless behavior If you notice these less serious side effects, talk with your doctor: · Back, muscle, or joint pain · Change in appetite · Constipation, diarrhea, or stomach pain If you notice other side effects that you think are caused by this medicine, tell your doctor. Call your doctor for medical advice about side effects. You may report side effects to FDA at 4-317-FDA-6544 © 2017 [address] Information is for End User's use only and may not be sold, redistributed or otherwise used for commercial purposes. The above information is an  only.  It is not intended as medical advice for individual conditions or treatments. Talk to your doctor, nurse or pharmacist before following any medical regimen to see if it is safe and effective for you.

## 2018-10-31 NOTE — TELEPHONE ENCOUNTER
LM to discuss refill request from [facility] for Vivitrol. Do dose or sig was included with request and this med is not in pt chart.

## 2018-10-31 NOTE — TELEPHONE ENCOUNTER
I spoke with pharmacy, they need pcp to send a prescription to [facility] delivery speciality pharmacy.

## 2018-11-01 ENCOUNTER — TELEPHONE (OUTPATIENT)
Dept: INTERNAL MEDICINE CLINIC | Age: 23
End: 2018-11-01

## 2018-11-01 NOTE — TELEPHONE ENCOUNTER
Per patient the nurse was going to call her back yesterday regarding a Prescription and she has not hear back from her.  No is [phone]

## 2018-11-01 NOTE — TELEPHONE ENCOUNTER
I called pt back, [facility] states [facility], I advise we sent the rx yesterday to the speciality pharmacy pt verbalized understanding.

## 2018-11-08 ENCOUNTER — OFFICE VISIT (OUTPATIENT)
Dept: INTERNAL MEDICINE CLINIC | Age: 23
End: 2018-11-08

## 2018-11-08 VITALS
DIASTOLIC BLOOD PRESSURE: 79 MMHG | OXYGEN SATURATION: 97 % | SYSTOLIC BLOOD PRESSURE: 124 MMHG | HEART RATE: 78 BPM | WEIGHT: 237.8 LBS | RESPIRATION RATE: 12 BRPM | HEIGHT: 64 IN | BODY MASS INDEX: 40.6 KG/M2 | TEMPERATURE: 98.6 F

## 2018-11-08 DIAGNOSIS — F41.9 ANXIETY: ICD-10-CM

## 2018-11-08 RX ORDER — FLUOXETINE 20 MG/1
80 TABLET ORAL DAILY
Qty: 180 TAB | Refills: 0
Start: 2018-11-08 | End: 2022-09-26

## 2018-11-08 NOTE — PROGRESS NOTES
Chief Complaint   Patient presents with    Injection     Patient presents for follow-up and to get her a trial injection. Anxiety  Patient reports she is doing well with the increase in BuSpar. Her mother is not living with her and she is living independently. She reports she is doing well. She is interested in doing some classes because she enjoys learning. She is thinking out becoming a nurse educator. She will be involved with [employer]ise does brain mapping and then [address] [place] [address]. Patient will have her first visit with psychiatrist Dr. Jamil Martinez. First appt in 2 weeks. She is still seeing Counselor Quentin Aleman twice a week    Past Medical History:   Diagnosis Date    Anxiety 2010    Bee sting allergy     severe. Dr. Chapis Leone at [facility]: kidney disease 11/24/2012    Lyme disease     Musculoskeletal disorder 2009    Dislocated Rt shoulder    Recurrent UTI     hx pyelo. saw urology.   took macrodantin age 16 3 year     Past Surgical History:   Procedure Laterality Date    HX TONSILLECTOMY  2002    SHOULDER SURG PROC UNLISTED Right 12/2010    torn labrum and tendon repair/right     Social History     Socioeconomic History    Marital status: SINGLE     Spouse name: Not on file    Number of children: Not on file    Years of education: Not on file    Highest education level: Not on file   Social Needs    Financial resource strain: Not on file    Food insecurity - worry: Not on file    Food insecurity - inability: Not on file   [employer] needs - medical: Not on file   [employer] needs - non-medical: Not on file   Occupational History    Occupation: [employer]   Tobacco Use    Smoking status: Never Smoker    Smokeless tobacco: Never Used   Substance and Sexual Activity    Alcohol use: No     Comment: no ETOH since 12/1/2017    Drug use: Yes     Types: Opiates     Comment: sober since 12/1/2017    Sexual activity: Yes     Birth control/protection: Pill   Other Topics Concern    Not on file   Social History Narrative    Working full time at [employer] [employer], 40 hours/week + overtime    Not doing night shifts        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 [address] History   Problem Relation Age of Onset    Anxiety Mother     Kidney Disease Paternal Grandmother         also in cousins    Cancer Maternal Grandmother         skin     Current Outpatient Medications   Medication Sig Dispense Refill    busPIRone (BUSPAR) 10 mg tablet Take 1 Tab by mouth three (3) times daily (with meals). 90 Tab 0    norethindrone ac-eth estradiol (BETO 1.5/30, 21,) 1.5-30 mg-mcg tab Take  by mouth.  dicyclomine (BENTYL) 10 mg capsule Take 10 mg by mouth four (4) times daily.  EPINEPHrine (EPIPEN 2-NAVEEN) 0.3 mg/0.3 mL injection 0.3 mL by IntraMUSCular route once as needed for up to 1 dose. 2 Syringe 1    FLUoxetine (PROZAC) 20 mg tablet Take 2 Tabs by mouth daily. Indications: GENERALIZED ANXIETY DISORDER (Patient taking differently: Take 80 mg by mouth daily. ) 180 Tab 3    ibuprofen (MOTRIN) 600 mg tablet Take 1 Tab by mouth every eight (8) hours as needed for Pain.  30 Tab 0    VIBERZI 100 mg tablet TAKE 1 TABLET BY MOUTH TWICE A DAY  3     Allergies   Allergen Reactions    Bee Sting Kit Anaphylaxis     And wasps    Cefepime Hives and Swelling    Cephalosporins Anaphylaxis    Ciprofloxacin Anaphylaxis    Rocephin [Ceftriaxone] Hives    Zithromax [Azithromycin] Hives       Review of Systems - General ROS: negative for - chills, fatigue, fever or hot flashes  Cardiovascular ROS: no chest pain or dyspnea on exertion  Respiratory ROS: no cough, shortness of breath, or wheezing    Visit Vitals  /79 (BP 1 Location: Left arm, BP Patient Position: Sitting)   Pulse 78   Temp 98.6 °F (37 °C) (Oral)   Resp 12   Ht 5' 4\" (1.626 m)   Wt 237 lb 12.8 oz (107.9 kg)   LMP 10/25/2018 (Approximate)   SpO2 97%   BMI 40.82 kg/m²     General Appearance:  Well developed, well nourished,alert and oriented x 3, and individual in no acute distress. Ears/Nose/Mouth/Throat:   Hearing grossly normal.         Neck: Supple, no lad, no bruits   Chest:   Lungs clear to auscultation bilaterally. Cardiovascular:  Regular rate and rhythm, S1, S2 normal, no murmur. Abdomen:   Soft, non-tender, bowel sounds are active. Extremities: No edema bilaterally. Skin: Warm and dry, no suspicious lesions                 Diagnoses and all orders for this visit:    1. Anxiety  Patient is on Prozac 80 mg and BuSpar. This appears to be keeping her stable. No SI or HI  -     FLUoxetine (PROZAC) 20 mg tablet; Take 4 Tabs by mouth daily. History of opioid use  Patient is being followed on a program and needs to have monthly injections which will need witness  -     naltrexone microspheres (VIVITROL) 380 mg ER injection; 380 mg by IntraMUSCular route once for 1 dose. Injection given and was observed for 30  minutes by nursing. This note will not be viewable in [address]

## 2018-11-08 NOTE — PROGRESS NOTES
Pt was given Naltrexone 380 mg Im in left Gluteus per PCP, pt stayed 30 minutes, no S/S from medication , copy made and scanned in chart , witnessed by PCP form given to pt.

## 2018-11-12 ENCOUNTER — TELEPHONE (OUTPATIENT)
Dept: INTERNAL MEDICINE CLINIC | Age: 23
End: 2018-11-12

## 2018-11-12 NOTE — TELEPHONE ENCOUNTER
Per [facility] Pharmacist patient needs a Prior Auth on the medication  Yanci cassidy      Call [phone] no is [phone]

## 2018-11-13 ENCOUNTER — OFFICE VISIT (OUTPATIENT)
Dept: INTERNAL MEDICINE CLINIC | Age: 23
End: 2018-11-13

## 2018-11-13 VITALS
DIASTOLIC BLOOD PRESSURE: 84 MMHG | RESPIRATION RATE: 12 BRPM | HEART RATE: 84 BPM | HEIGHT: 64 IN | TEMPERATURE: 98.4 F | OXYGEN SATURATION: 98 % | SYSTOLIC BLOOD PRESSURE: 123 MMHG | WEIGHT: 237.2 LBS | BODY MASS INDEX: 40.49 KG/M2

## 2018-11-13 DIAGNOSIS — E55.9 VITAMIN D DEFICIENCY: ICD-10-CM

## 2018-11-13 DIAGNOSIS — F32.A DEPRESSION, UNSPECIFIED DEPRESSION TYPE: ICD-10-CM

## 2018-11-13 DIAGNOSIS — E78.2 MIXED HYPERLIPIDEMIA: ICD-10-CM

## 2018-11-13 DIAGNOSIS — T78.2XXS ANAPHYLAXIS, SEQUELA: Primary | ICD-10-CM

## 2018-11-13 RX ORDER — BUPROPION HYDROCHLORIDE 75 MG/1
75 TABLET ORAL 2 TIMES DAILY
Qty: 60 TAB | Refills: 1 | Status: SHIPPED | OUTPATIENT
Start: 2018-11-13 | End: 2018-12-19 | Stop reason: ALTCHOICE

## 2018-11-13 RX ORDER — EPINEPHRINE 0.3 MG/.3ML
0.3 INJECTION SUBCUTANEOUS
Qty: 2 SYRINGE | Refills: 2 | Status: SHIPPED | OUTPATIENT
Start: 2018-11-13 | End: 2018-11-13

## 2018-11-13 NOTE — PROGRESS NOTES
Chief Complaint   Patient presents with    Depression     Patient presents with her mother. Anxiety  Patient reports she will start with her therapy. She is seeing psychiatrist, Dr. Nathalia Coombs, [address] [place] [address] through [address] [place] [address]. Dr. Bhavya Hodges is doing primarily [address] [place] [address] and does not prescribe medication. Patient will see Dr. Donna Becerril in 2 weeks. She thinks the BuSpar is doing well at 10 mg at noon and 5 mg twice daily. Depression  Patient reports her symptoms are stable. She denies feelings of worthlessness/guilt, difficulty concentrating, hopelessness, impaired memory, recurrent thoughts of death and suicidal thoughts without plan. She is interested in doing nurse education. She still has significant fatigue and still some depressed mood. The buspar is not helping with her depression. She tolerated the Vivitrol injection but had nausea for 2 days. She reports she will continue with the injection. She is seeing her couselor twice a week. Hyperlipidemia  Patient has been trying to improve her diet. We will recheck her LDL. Bee sting allergy  Patient needs EpiPen for anaphylaxis prevention. Past Medical History:   Diagnosis Date    Anxiety 2010    Bee sting allergy     severe. [facility] Segovia  [facility]: kidney disease 11/24/2012    Lyme disease     Musculoskeletal disorder 2009    Dislocated Rt shoulder    Recurrent UTI     hx pyelo. saw urology.   took macrodantin age [age] year     Past Surgical History:   Procedure Laterality Date    HX TONSILLECTOMY  2002    SHOULDER SURG PROC UNLISTED Right 12/2010    torn labrum and tendon repair/right     Social History     Socioeconomic History    Marital status: SINGLE     Spouse name: Not on file    Number of children: Not on file    Years of education: Not on file    Highest education level: Not on file   Social Needs    Financial resource strain: Not on file    Food insecurity - worry: Not on file    Food insecurity - inability: Not on file   [employer] needs - medical: Not on file   [employer] needs - non-medical: Not on file   Occupational History    Occupation: [employer]   Tobacco Use    Smoking status: Never Smoker    Smokeless tobacco: Never Used   Substance and Sexual Activity    Alcohol use: No     Comment: no ETOH since 12/1/2017    Drug use: Yes     Types: Opiates     Comment: sober since 12/1/2017    Sexual activity: Yes     Birth control/protection: Pill   Other Topics Concern    Not on file   Social History Narrative    Working full time at [address], 40 hours/week + overtime    Not doing night shifts        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  EPINEPHrine (EPIPEN) 0.3 mg/0.3 mL injection 0.3 mL by IntraMUSCular route once as needed for up to 1 dose. 2 Syringe 2    buPROPion (WELLBUTRIN) 75 mg tablet Take 1 Tab by mouth two (2) times a day. 60 Tab 1    FLUoxetine (PROZAC) 20 mg tablet Take 4 Tabs by mouth daily. 180 Tab 0    busPIRone (BUSPAR) 10 mg tablet Take 1 Tab by mouth three (3) times daily (with meals). 90 Tab 0    norethindrone ac-eth estradiol (BETO 1.5/30, 21,) 1.5-30 mg-mcg tab Take  by mouth.  dicyclomine (BENTYL) 10 mg capsule Take 10 mg by mouth four (4) times daily.  ibuprofen (MOTRIN) 600 mg tablet Take 1 Tab by mouth every eight (8) hours as needed for Pain.  30 Tab 0     Allergies   Allergen Reactions    Bee Sting Kit Anaphylaxis     And wasps    Cefepime Hives and Swelling    Cephalosporins Anaphylaxis    Ciprofloxacin Anaphylaxis    Rocephin [Ceftriaxone] Hives    Zithromax [Azithromycin] Hives       Review of Systems - General ROS: positive for  - fatigue, malaise, sleep disturbance and weight gain  negative for - fever or hot flashes  Cardiovascular ROS: no chest pain or dyspnea on exertion  Respiratory ROS: no cough, shortness of breath, or wheezing    Visit Vitals  /84 (BP 1 Location: Left arm, BP Patient Position: Sitting)   Pulse 84   Temp 98.4 °F (36.9 °C) (Oral)   Resp 12   Ht 5' 4\" (1.626 m)   Wt 237 lb 3.2 oz (107.6 kg)   LMP 10/28/2018   SpO2 98%   BMI 40.72 kg/m²     General Appearance:  Well developed, well nourished,alert and oriented x 3, and individual in no acute distress. Ears/Nose/Mouth/Throat:   Hearing grossly normal.         Neck: Supple, no lad, no bruits   Chest:   Lungs clear to auscultation bilaterally. Cardiovascular:  Regular rate and rhythm, S1, S2 normal, no murmur. Abdomen:   Soft, non-tender, bowel sounds are active. Extremities: No edema bilaterally. Skin: Warm and dry, no suspicious lesions               Diagnoses and all orders for this visit:    1. Anaphylaxis, sequela no acute symptoms today but will need EpiPen for the future    -     EPINEPHrine (EPIPEN) 0.3 mg/0.3 mL injection; 0.3 mL by IntraMUSCular route once as needed for up to 1 dose. 2. Depression, unspecified depression type  Patient is not optimally treated will augment her Prozac with Wellbutrin. She will also see Dr. Patel Gilliam and can adjust medications for patient. She will follow-up with Dr. Nuria Salazar [facility]  -     buPROPion [facility]) 75 mg tablet; Take 1 Tab by mouth two (2) times a day. Patient will need liver check with use long acting naltrexone. I gave patient a lab slip to check    3. Mixed hyperlipidemia    -     METABOLIC PANEL, COMPREHENSIVE  -     LIPID PANEL    4. Vitamin D deficiency  Increase vitamin D levels if needed with prescription      Follow-up in 3 months or earlier if needed.

## 2018-11-20 ENCOUNTER — TELEPHONE (OUTPATIENT)
Dept: INTERNAL MEDICINE CLINIC | Age: 23
End: 2018-11-20

## 2018-11-20 NOTE — TELEPHONE ENCOUNTER
[address] needs to speak with Nurse regarding this medication for patient [facility]  No is [phone]

## 2018-11-20 NOTE — TELEPHONE ENCOUNTER
PA has already been submitted via cover my meds- please see other telephone encounter. Form was faxed from [employer] for our office to fill out and pcp sign. Form will be given to PCP to sign and for us to fax back.

## 2018-11-20 NOTE — TELEPHONE ENCOUNTER
I called and spoke to Akshat at [facility]. He states the patients insurance does not cover Vivitrol.  A PA needs to be completed

## 2018-11-25 RX ORDER — BUSPIRONE HYDROCHLORIDE 10 MG/1
TABLET ORAL
Qty: 90 TAB | Refills: 0 | Status: SHIPPED | OUTPATIENT
Start: 2018-11-25 | End: 2019-09-25

## 2018-12-05 ENCOUNTER — TELEPHONE (OUTPATIENT)
Dept: INTERNAL MEDICINE CLINIC | Age: 23
End: 2018-12-05

## 2018-12-05 DIAGNOSIS — F19.11 H/O DRUG ABUSE (HCC): Primary | ICD-10-CM

## 2018-12-05 NOTE — TELEPHONE ENCOUNTER
----- Message from Tawny Cinda sent at 12/5/2018  2:57 PM EST -----  Regarding: Fang Calix MD/ refill  Pt stated that she need a prescription for vivtrol shot sent to the [facility] specialty pharmacy (information is on file). Pts contact [phone].

## 2018-12-07 RX ORDER — NALTREXONE 380 MG
KIT INTRAMUSCULAR
Qty: 1 EACH | Refills: 2 | Status: SHIPPED | OUTPATIENT
Start: 2018-12-07 | End: 2019-01-10 | Stop reason: SDUPTHER

## 2018-12-18 ENCOUNTER — TELEPHONE (OUTPATIENT)
Dept: INTERNAL MEDICINE CLINIC | Age: 23
End: 2018-12-18

## 2018-12-18 NOTE — TELEPHONE ENCOUNTER
Patient states per her pharmacy , her medication Vivitrol should be delivered today , she's scheduled tmrw to have it administered.

## 2018-12-19 ENCOUNTER — OFFICE VISIT (OUTPATIENT)
Dept: INTERNAL MEDICINE CLINIC | Age: 23
End: 2018-12-19

## 2018-12-19 VITALS
RESPIRATION RATE: 12 BRPM | DIASTOLIC BLOOD PRESSURE: 85 MMHG | WEIGHT: 224 LBS | OXYGEN SATURATION: 96 % | TEMPERATURE: 98.2 F | SYSTOLIC BLOOD PRESSURE: 125 MMHG | BODY MASS INDEX: 38.24 KG/M2 | HEART RATE: 78 BPM | HEIGHT: 64 IN

## 2018-12-19 DIAGNOSIS — F11.11 HISTORY OF OPIOID ABUSE (HCC): ICD-10-CM

## 2018-12-19 DIAGNOSIS — F32.A DEPRESSION, UNSPECIFIED DEPRESSION TYPE: Primary | ICD-10-CM

## 2018-12-19 RX ORDER — HYDROXYZINE PAMOATE 25 MG/1
25 CAPSULE ORAL
COMMUNITY
End: 2019-07-23 | Stop reason: ALTCHOICE

## 2018-12-19 NOTE — PROGRESS NOTES
Vivitrol 380mg/vial IM injection given to pt in right gluteus. Witness paperwork signed by self and Dr Esteban Pruett. No reaction to medication administration and patient tolerated well.  Lot number [id number] Exp date 1/31/2021

## 2018-12-19 NOTE — PROGRESS NOTES
Chief Complaint   Patient presents with    Immunization/Injection     Vivitrol       Depression/substance abuse disorder  Patient is now being seen by psychiatrist Dr. Kenisha Martinez. She is responding to [address]. She is 16/32 visits and doing well. She reports her depression is improved. She is still on her same medications but Wellbutrin was discontinued as it may be more energizing    Past Medical History:   Diagnosis Date    Anxiety 2010    Bee sting allergy     severe. Dr. Deforest Fothergill at [facility]: kidney disease 11/24/2012    Lyme disease     Musculoskeletal disorder 2009    Dislocated Rt shoulder    Recurrent UTI     hx pyelo. saw urology.   took macrodantin age 16 3 year     Past Surgical History:   Procedure Laterality Date    HX TONSILLECTOMY  2002    SHOULDER SURG PROC UNLISTED Right 12/2010    torn labrum and tendon repair/right     Social History     Socioeconomic History    Marital status: SINGLE     Spouse name: Not on file    Number of children: Not on file    Years of education: Not on file    Highest education level: Not on file   Occupational History    Occupation: [employer]   Tobacco Use    Smoking status: Never Smoker    Smokeless tobacco: Never Used   Substance and Sexual Activity    Alcohol use: No     Comment: no ETOH since 12/1/2017    Drug use: Yes     Types: Opiates     Comment: sober since 12/1/2017    Sexual activity: Yes     Birth control/protection: Pill   Social History Narrative    Working full time at [address] [place] [address], 40 hours/week + overtime    Not doing night shifts        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 [address] [place] [address] Drive History   Problem Relation Age of Onset    Anxiety Mother     Kidney Disease Paternal Grandmother         also in cousins    Cancer Maternal Grandmother         skin     Current Outpatient Medications   Medication Sig Dispense Refill    hydrOXYzine pamoate (VISTARIL) 25 mg capsule Take 25 mg by mouth three (3) times daily as needed for Itching.  VIVITROL 380 mg ER injection INJECT 380MG INTRAMUSCULARLY ONCE AS DIRECTED 1 Each 2    busPIRone (BUSPAR) 10 mg tablet TAKE 1 TABLET BY MOUTH 3 TIMES A DAY WITH MEALS 90 Tab 0    FLUoxetine (PROZAC) 20 mg tablet Take 4 Tabs by mouth daily. 180 Tab 0    norethindrone ac-eth estradiol (BETO 1.5/30, 21,) 1.5-30 mg-mcg tab Take  by mouth.  ibuprofen (MOTRIN) 600 mg tablet Take 1 Tab by mouth every eight (8) hours as needed for Pain. 30 Tab 0    dicyclomine (BENTYL) 10 mg capsule Take 10 mg by mouth four (4) times daily. Allergies   Allergen Reactions    Bee Sting Kit Anaphylaxis     And wasps    Cefepime Hives and Swelling    Cephalosporins Anaphylaxis    Ciprofloxacin Anaphylaxis    Rocephin [Ceftriaxone] Hives    Zithromax [Azithromycin] Hives       Review of Systems - General ROS: negative for - chills, fatigue, fever, hot flashes, malaise or night sweats  Cardiovascular ROS: no chest pain or dyspnea on exertion  Respiratory ROS: no cough, shortness of breath, or wheezing    Visit Vitals  /85 (BP 1 Location: Left arm, BP Patient Position: Sitting)   Pulse 78   Temp 98.2 °F (36.8 °C) (Oral)   Resp 12   Ht 5' 4\" (1.626 m)   Wt 224 lb (101.6 kg)   LMP 12/05/2018 (Approximate)   SpO2 96%   BMI 38.45 kg/m²     General Appearance:  Well developed, well nourished,alert and oriented x 3, and individual in no acute distress. Ears/Nose/Mouth/Throat:   Hearing grossly normal.         Neck: Supple, no lad, no bruits   Chest:   Lungs clear to auscultation bilaterally. Cardiovascular:  Regular rate and rhythm, S1, S2 normal, no murmur. Abdomen:   Soft, non-tender, bowel sounds are active. Extremities: No edema bilaterally. Skin: Warm and dry, no suspicious lesions                 Diagnoses and all orders for this visit:    1.  Depression, unspecified depression type  Improving  Patient with episodic thrombocytosis and leukocytosis  -     CBC W/O DIFF  -     METABOLIC PANEL, COMPREHENSIVE    2.  History of opioid abuse  Vitriol administered by Lesia Burgess and I signed off   We will check CMP with regards to liver

## 2019-01-10 ENCOUNTER — TELEPHONE (OUTPATIENT)
Dept: INTERNAL MEDICINE CLINIC | Age: 24
End: 2019-01-10

## 2019-01-10 DIAGNOSIS — F19.10 SUBSTANCE ABUSE (HCC): Primary | ICD-10-CM

## 2019-01-10 NOTE — TELEPHONE ENCOUNTER
----- Message from Carmenza Record sent at 1/10/2019 11:20 AM EST -----  Regarding: Dr. Nithya London: [phone]  Pt advises that her psychiatrist would like for Dr. Tellez Later to continue prescribing her shot for \"Vivitrol\". She would like the Rx sent to [facility] so that they can issue the shot for January to the office. She says [facility] should have the paperwork for reordering because it was sent the last time the shot was ordered.

## 2019-01-25 ENCOUNTER — OFFICE VISIT (OUTPATIENT)
Dept: INTERNAL MEDICINE CLINIC | Age: 24
End: 2019-01-25

## 2019-01-25 VITALS
HEIGHT: 64 IN | OXYGEN SATURATION: 97 % | DIASTOLIC BLOOD PRESSURE: 83 MMHG | WEIGHT: 250.6 LBS | HEART RATE: 78 BPM | SYSTOLIC BLOOD PRESSURE: 132 MMHG | RESPIRATION RATE: 12 BRPM | TEMPERATURE: 98.2 F | BODY MASS INDEX: 42.78 KG/M2

## 2019-01-25 DIAGNOSIS — Z51.81 MEDICATION MONITORING ENCOUNTER: ICD-10-CM

## 2019-01-25 DIAGNOSIS — E78.2 MIXED HYPERLIPIDEMIA: Primary | ICD-10-CM

## 2019-01-25 DIAGNOSIS — R89.9 ABNORMAL LABORATORY TEST: ICD-10-CM

## 2019-01-25 DIAGNOSIS — F19.11 HISTORY OF DRUG ABUSE IN REMISSION (HCC): ICD-10-CM

## 2019-01-25 NOTE — PROGRESS NOTES
Patient is here to receive a Vivitrol injection. I administered it intramuscularly in the right hip. Pt tolerated well, no adverse reactions currently or with past injections. Vivitrol NDC 03980-395-43 Lot [id number] Exp 4/20/21. Diluent NDC 41224-257-50 Lot [id number] Exp 12/20/21. [facility] [employer] Monitoring Program Monthly Witness Vivitrol form signed by myself and Dr Nino Torres. Copy made to scan into patients chart, original given back to patient.

## 2019-01-25 NOTE — PROGRESS NOTES
Chief Complaint   Patient presents with    Injection     Vivitrol     Patient presents for need for injection of her Glucotrol. Depression history of drug abuse  Patient reports she has been doing well and finished her [address] [place] therapy. She reports her energy level is better. She is sleeping well. She is not exercising regularly but will start. She denies any side effects with her Vivitrol. Hyperlipidemia  Patient labs were reviewed with her. She is using my fitness pal to help her with her diet and has been losing weight. Thrombocytosis and leukocytosis  Labs reviewed with patient. She reports she occasionally has an increase in her white blood count and platelet count. She attributes this to some IBS issues      Past Medical History:   Diagnosis Date    Anxiety 2010    Bee sting allergy     severe. Dr. Jalen Ngo at [facility]: kidney disease 11/24/2012    Lyme disease     Musculoskeletal disorder 2009    Dislocated Rt shoulder    Recurrent UTI     hx pyelo. saw urology.   took macrodantin age [age] year     Past Surgical History:   Procedure Laterality Date    HX TONSILLECTOMY  2002    SHOULDER SURG PROC UNLISTED Right 12/2010    torn labrum and tendon repair/right     Social History     Socioeconomic History    Marital status: SINGLE     Spouse name: Not on file    Number of children: Not on file    Years of education: Not on file    Highest education level: Not on file   Occupational History    Occupation: [employer]   Tobacco Use    Smoking status: Never Smoker    Smokeless tobacco: Never Used   Substance and Sexual Activity    Alcohol use: No     Comment: no ETOH since 12/1/2017    Drug use: Yes     Types: Opiates     Comment: sober since 12/1/2017    Sexual activity: Yes     Birth control/protection: Pill   Social History Narrative    Working full time at [employer], 40 hours/week + overtime    Not doing night shifts        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 180 Tab 0    norethindrone ac-eth estradiol (BETO 1.5/30, 21,) 1.5-30 mg-mcg tab Take  by mouth.  ibuprofen (MOTRIN) 600 mg tablet Take 1 Tab by mouth every eight (8) hours as needed for Pain. 30 Tab 0    dicyclomine (BENTYL) 10 mg capsule Take 10 mg by mouth four (4) times daily.  hydrOXYzine pamoate (VISTARIL) 25 mg capsule Take 25 mg by mouth three (3) times daily as needed for Itching. Allergies   Allergen Reactions    Bee Sting Kit Anaphylaxis     And wasps    Cefepime Hives and Swelling    Cephalosporins Anaphylaxis    Ciprofloxacin Anaphylaxis    Rocephin [Ceftriaxone] Hives    Zithromax [Azithromycin] Hives       Review of Systems - General ROS: negative for - chills, fatigue, fever, hot flashes, malaise, night sweats or sleep disturbance  Cardiovascular ROS: no chest pain or dyspnea on exertion  Respiratory ROS: no cough, shortness of breath, or wheezing    Visit Vitals  /83 (BP 1 Location: Left arm, BP Patient Position: Sitting)   Pulse 78   Temp 98.2 °F (36.8 °C) (Oral)   Resp 12   Ht 5' 4\" (1.626 m)   Wt 250 lb 9.6 oz (113.7 kg)   SpO2 97%   BMI 43.02 kg/m²     General Appearance:  Well developed, well nourished,alert and oriented x 3, and individual in no acute distress. Ears/Nose/Mouth/Throat:   Hearing grossly normal.         Neck: Supple, no lad, no bruits   Chest:   Lungs clear to auscultation bilaterally.    Cardiovascular:  Regular rate and rhythm, S1, S2 normal, no murmur. Abdomen:   Soft, non-tender, bowel sounds are active. Extremities: No edema bilaterally. Skin: Warm and dry, no suspicious lesions                 Diagnoses and all orders for this visit:    1. Mixed hyperlipidemia  We will recheck labs  Her mother with cholesterol issues  She will likely not need medication but we should monitor and see where she is it may also help her with her compliance with diet and exercise  -     METABOLIC PANEL, COMPREHENSIVE  -     LIPID PANEL    2. Abnormal laboratory test  Monitor to assure ensure normalizing  -     CBC W/O DIFF    3. Medication monitoring encounter  Patient is on Vivitrol we will check her liver function  -     METABOLIC PANEL, COMPREHENSIVE    4. History of drug abuse in remission  Patient appears to be doing very well.   Doing very very well continue support and encouragement

## 2019-01-26 LAB
ALBUMIN SERPL-MCNC: 4.4 G/DL (ref 3.5–5.5)
ALBUMIN/GLOB SERPL: 1.6 {RATIO} (ref 1.2–2.2)
ALP SERPL-CCNC: 101 IU/L (ref 39–117)
ALT SERPL-CCNC: 16 IU/L (ref 0–32)
AST SERPL-CCNC: 15 IU/L (ref 0–40)
BILIRUB SERPL-MCNC: <0.2 MG/DL (ref 0–1.2)
BUN SERPL-MCNC: 13 MG/DL (ref 6–20)
BUN/CREAT SERPL: 20 (ref 9–23)
CALCIUM SERPL-MCNC: 9.4 MG/DL (ref 8.7–10.2)
CHLORIDE SERPL-SCNC: 102 MMOL/L (ref 96–106)
CHOLEST SERPL-MCNC: 255 MG/DL (ref 100–199)
CO2 SERPL-SCNC: 20 MMOL/L (ref 20–29)
CREAT SERPL-MCNC: 0.65 MG/DL (ref 0.57–1)
ERYTHROCYTE [DISTWIDTH] IN BLOOD BY AUTOMATED COUNT: 13.2 % (ref 12.3–15.4)
GLOBULIN SER CALC-MCNC: 2.7 G/DL (ref 1.5–4.5)
GLUCOSE SERPL-MCNC: 83 MG/DL (ref 65–99)
HCT VFR BLD AUTO: 39.5 % (ref 34–46.6)
HDLC SERPL-MCNC: 58 MG/DL
HGB BLD-MCNC: 12.8 G/DL (ref 11.1–15.9)
INTERPRETATION, 910389: NORMAL
LDLC SERPL CALC-MCNC: 148 MG/DL (ref 0–99)
MCH RBC QN AUTO: 28.4 PG (ref 26.6–33)
MCHC RBC AUTO-ENTMCNC: 32.4 G/DL (ref 31.5–35.7)
MCV RBC AUTO: 88 FL (ref 79–97)
PLATELET # BLD AUTO: 331 X10E3/UL (ref 150–379)
POTASSIUM SERPL-SCNC: 4.7 MMOL/L (ref 3.5–5.2)
PROT SERPL-MCNC: 7.1 G/DL (ref 6–8.5)
RBC # BLD AUTO: 4.5 X10E6/UL (ref 3.77–5.28)
SODIUM SERPL-SCNC: 139 MMOL/L (ref 134–144)
TRIGL SERPL-MCNC: 246 MG/DL (ref 0–149)
VLDLC SERPL CALC-MCNC: 49 MG/DL (ref 5–40)
WBC # BLD AUTO: 8.6 X10E3/UL (ref 3.4–10.8)

## 2019-03-01 ENCOUNTER — OFFICE VISIT (OUTPATIENT)
Dept: INTERNAL MEDICINE CLINIC | Age: 24
End: 2019-03-01

## 2019-03-01 VITALS
RESPIRATION RATE: 18 BRPM | TEMPERATURE: 98.3 F | HEIGHT: 64 IN | SYSTOLIC BLOOD PRESSURE: 113 MMHG | DIASTOLIC BLOOD PRESSURE: 79 MMHG | WEIGHT: 254 LBS | BODY MASS INDEX: 43.36 KG/M2 | HEART RATE: 79 BPM | OXYGEN SATURATION: 99 %

## 2019-03-01 DIAGNOSIS — E55.9 VITAMIN D DEFICIENCY: ICD-10-CM

## 2019-03-01 DIAGNOSIS — Z51.81 MEDICATION MONITORING ENCOUNTER: Primary | ICD-10-CM

## 2019-03-01 DIAGNOSIS — E78.2 MIXED HYPERLIPIDEMIA: ICD-10-CM

## 2019-03-01 NOTE — PROGRESS NOTES
Chief Complaint   Patient presents with    Injection     Patient presents for need for injection of her Vivitrol, long acting naltrexone    Depression history of drug abuse  Patient reports she is still seeing psychiatrist, Dr. Mehul Mendez. She reports her energy level is better. She is sleeping well. She does not have any side effects on her BuSpar or Prozac. She has no right upper quadrant pain and no side effects with vivtrol. She is not using any alcohol or illicit substances. 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 Hyperlipidemia  Labs reviewed with patient. Vitamin D deficiency  Patient has been taking 5000 international units/day since October. She denies any bleeding or bruising. Past Medical History:   Diagnosis Date    Anxiety 2010    Bee sting allergy     severe. [facility] Etta Rosas at [facility]: kidney disease 11/24/2012    Lyme disease     Musculoskeletal disorder 2009    Dislocated Rt shoulder    Recurrent UTI     hx pyelo. saw urology.   took macrodantin age [age] year     Past Surgical History:   Procedure Laterality Date    HX TONSILLECTOMY  2002    SHOULDER SURG PROC UNLISTED Right 12/2010    torn labrum and tendon repair/right     Social History     Socioeconomic History    Marital status: SINGLE     Spouse name: Not on file    Number of children: Not on file    Years of education: Not on file    Highest education level: Not on file   Occupational History    Occupation: [employer]   Tobacco Use    Smoking status: Never Smoker    Smokeless tobacco: Never Used   Substance and Sexual Activity    Alcohol use: No     Comment: no ETOH since 12/1/2017    Drug use: Yes     Types: Opiates     Comment: sober since 12/1/2017    Sexual activity: Yes     Birth control/protection: Pill   Social History Narrative    Working full time at [address] [employer], 40 hours/week + overtime    Not doing night shifts        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 180 Tab 0    hydrOXYzine pamoate (VISTARIL) 25 mg capsule Take 25 mg by mouth three (3) times daily as needed for Itching.  norethindrone ac-eth estradiol (BETO 1.5/30, 21,) 1.5-30 mg-mcg tab Take  by mouth.  ibuprofen (MOTRIN) 600 mg tablet Take 1 Tab by mouth every eight (8) hours as needed for Pain. 30 Tab 0    dicyclomine (BENTYL) 10 mg capsule Take 10 mg by mouth four (4) times daily. Allergies   Allergen Reactions    Bee Sting Kit Anaphylaxis     And wasps    Cefepime Hives and Swelling    Cephalosporins Anaphylaxis    Ciprofloxacin Anaphylaxis    Rocephin [Ceftriaxone] Hives    Zithromax [Azithromycin] Hives       Review of Systems - General ROS: negative for - chills, fatigue, fever, hot flashes, malaise, night sweats or sleep disturbance  Cardiovascular ROS: no chest pain or dyspnea on exertion  Respiratory ROS: no cough, shortness of breath, or wheezing    Visit Vitals  /79 (BP 1 Location: Left arm, BP Patient Position: Sitting)   Pulse 79   Temp 98.3 °F (36.8 °C) (Oral)   Resp 18   Ht 5' 4\" (1.626 m)   Wt 254 lb (115.2 kg)   LMP 02/15/2019   SpO2 99%   BMI 43.60 kg/m²     General Appearance:  Well developed, well nourished,alert and oriented x 3, and individual in no acute distress.    Ears/Nose/Mouth/Throat:   Hearing grossly normal.         Neck: Supple, no lad, no bruits   Chest:   Lungs clear to auscultation bilaterally. Cardiovascular:  Regular rate and rhythm, S1, S2 normal, no murmur. Abdomen:   Soft, non-tender, bowel sounds are active. Extremities: No edema bilaterally. Skin: Warm and dry, no suspicious lesions                 Diagnoses and all orders for this visit:    1. Medication monitoring encounter  Vivtriol given and no side effects  Continue monthly injections    2. Vitamin D deficiency  Discussed with patient and she will take vitamin D3 5000 twice a week instead of daily as this is fat-soluble. 3. Mixed hyperlipidemia  Continue exercise and heart healthy diet. Her 10-year cardiovascular risk score is less than 7.5% so we will hold on her scription medication.

## 2019-03-15 ENCOUNTER — TELEPHONE (OUTPATIENT)
Dept: INTERNAL MEDICINE CLINIC | Age: 24
End: 2019-03-15

## 2019-03-15 NOTE — TELEPHONE ENCOUNTER
----- Message from Radames Dacosta sent at 3/15/2019  1:01 PM EDT -----  Regarding: Kathy Calix MD/ telephone  Freddy Pradhan from [place] [address]  Would like to schedule delivery for vivitrol prescription. [phone].

## 2019-03-15 NOTE — TELEPHONE ENCOUNTER
Spoke to pharmacist, medication (Vivitrol) is scheduled to be deliver to our office on 3/18/19 or 3/19/19.

## 2019-03-27 ENCOUNTER — TELEPHONE (OUTPATIENT)
Dept: INTERNAL MEDICINE CLINIC | Age: 24
End: 2019-03-27

## 2019-03-27 NOTE — TELEPHONE ENCOUNTER
[employer] request:       With Provider: Anjum Abreu MD [[facility]]     Preferred Date Range: 3/28/2019 - 3/29/2019     Preferred Times: Any time     Reason for visit: Request an Appointment     Comments:   Recieve monthly Vivitrol injection

## 2019-03-27 NOTE — TELEPHONE ENCOUNTER
Attempted to call pt to schedule x3. Busy tone only.  When pt returns call, please schedule next available appt

## 2019-04-02 ENCOUNTER — OFFICE VISIT (OUTPATIENT)
Dept: INTERNAL MEDICINE CLINIC | Age: 24
End: 2019-04-02

## 2019-04-02 VITALS
BODY MASS INDEX: 43.33 KG/M2 | OXYGEN SATURATION: 96 % | SYSTOLIC BLOOD PRESSURE: 113 MMHG | DIASTOLIC BLOOD PRESSURE: 77 MMHG | HEIGHT: 64 IN | HEART RATE: 89 BPM | WEIGHT: 253.8 LBS | TEMPERATURE: 98.4 F | RESPIRATION RATE: 12 BRPM

## 2019-04-02 DIAGNOSIS — F32.A DEPRESSION, UNSPECIFIED DEPRESSION TYPE: Primary | ICD-10-CM

## 2019-04-02 DIAGNOSIS — E55.9 VITAMIN D DEFICIENCY: ICD-10-CM

## 2019-04-02 DIAGNOSIS — F41.9 ANXIETY: ICD-10-CM

## 2019-04-02 DIAGNOSIS — F19.11 HISTORY OF DRUG ABUSE (HCC): ICD-10-CM

## 2019-04-02 NOTE — PATIENT INSTRUCTIONS
Hyperlipidemia: After Your Visit  Your Care Instructions  Hyperlipidemia is too much fat in your blood. The body has several kinds of fat, including cholesterol and triglycerides. Your body needs fat for many things, such as making new cells. But too much fat in your blood increases your chances of having a heart attack or stroke. You may be able to lower your cholesterol and triglycerides with a heart-healthy diet, exercise, and if needed, medicine. Your doctor may want you to try lifestyle changes first to see whether they lower the fat in your blood. You may need to take medicine if lifestyle changes do not lower the fat in your blood enough. Follow-up care is a key part of your treatment and safety. Be sure to make and go to all appointments, and call your doctor if you are having problems. Its also a good idea to know your test results and keep a list of the medicines you take. How can you care for yourself at home? Take your medicines  · Take your medicines exactly as prescribed. Call your doctor if you think you are having a problem with your medicine. · If you take medicine to lower your cholesterol, go to follow-up visits. You will need to have blood tests. · Do not take large doses of niacin, which is a B vitamin, while taking medicine called statins. It may increase the chance of muscle pain and liver problems. · Talk to your doctor about avoiding grapefruit juice if you are taking statins. Grapefruit juice can raise the level of this medicine in your blood. This could increase side effects. Eat more fruits, vegetables, and fiber  · Fruits and vegetables have lots of nutrients that help protect against heart disease, and they have little--if any--fat. Try to eat at least five servings a day. Dark green, deep orange, or yellow fruits and vegetables are healthy choices. · Keep carrots, celery, and other veggies handy for snacks.  Buy fruit that is in season and store it where you can see it so that you will be tempted to eat it. Cook dishes that have a lot of veggies in them, such as stir-fries and soups. · Foods high in fiber may reduce your cholesterol and provide important vitamins and minerals. High-fiber foods include whole-grain cereals and breads, oatmeal, beans, brown rice, citrus fruits, and apples. · Buy whole-grain breads and cereals instead of white bread and pastries. Limit saturated fat  · Read food labels and try to avoid saturated fat and trans fat. They increase your risk of heart disease. · Use olive or canola oil when you cook. Try cholesterol-lowering spreads, such as Benecol or Take Control. · Bake, broil, grill, or steam foods instead of frying them. · Limit the amount of high-fat meats you eat, including hot dogs and sausages. Cut out all visible fat when you prepare meat. · Eat fish, skinless poultry, and soy products such as tofu instead of high-fat meats. Soybeans may be especially good for your heart. Eat at least two servings of fish a week. Certain fish, such as salmon, contain omega-3 fatty acids, which may help reduce your risk of heart attack. · Choose low-fat or fat-free milk and dairy products. Get exercise, limit alcohol, and quit smoking  · Get more exercise. Work with your doctor to set up an exercise program. Even if you can do only a small amount, exercise will help you get stronger, have more energy, and manage your weight and your stress. Walking is an easy way to get exercise. Gradually increase the amount you walk every day. Aim for at least 30 minutes on most days of the week. You also may want to swim, bike, or do other activities. · Limit alcohol to no more than 2 drinks a day for men and 1 drink a day for women. · Do not smoke. If you need help quitting, talk to your doctor about stop-smoking programs and medicines. These can increase your chances of quitting for good. When should you call for help?   Call 911 anytime you think you may need emergency care. For example, call if:  · You have symptoms of a heart attack. These may include:  ¨ Chest pain or pressure, or a strange feeling in the chest.  ¨ Sweating. ¨ Shortness of breath. ¨ Nausea or vomiting. ¨ Pain, pressure, or a strange feeling in the back, neck, jaw, or upper belly or in one or both shoulders or arms. ¨ Lightheadedness or sudden weakness. ¨ A fast or irregular heartbeat. After you call 911, the  may tell you to chew 1 adult-strength or 2 to 4 low-dose aspirin. Wait for an ambulance. Do not try to drive yourself. · You have signs of a stroke. These may include:  ¨ Sudden numbness, paralysis, or weakness in your face, arm, or leg, especially on only one side of your body. ¨ New problems with walking or balance. ¨ Sudden vision changes. ¨ Drooling or slurred speech. ¨ New problems speaking or understanding simple statements, or feeling confused. ¨ A sudden, severe headache that is different from past headaches. · You passed out (lost consciousness). Call your doctor now or seek immediate medical care if:  · You have muscle pain or weakness. Watch closely for changes in your health, and be sure to contact your doctor if:  · You are very tired. · You have an upset stomach, gas, constipation, or belly pain or cramps. Where can you learn more? Go to AAMPP.be  Enter C406 in the search box to learn more about \"Hyperlipidemia: After Your Visit. \"   © 4777-6151 Healthwise, Incorporated. Care instructions adapted under license by [employer] (which disclaims liability or warranty for this information). This care instruction is for use with your licensed healthcare professional. If you have questions about a medical condition or this instruction, always ask your healthcare professional. [place] [address] any warranty or liability for your use of this information.   Content Version: 8.3.838073; Last Revised: October 13, 2011

## 2019-04-02 NOTE — PROGRESS NOTES
Vivitrol 380mg administered intramuscularly in right gluteus. Pt tolerated well.  Lot Number [id number], expiration May 31, 2021 [facility] 64536-695-53

## 2019-04-02 NOTE — PROGRESS NOTES
Chief Complaint   Patient presents with    Injection     Vivitrol injection     Patient presents for need for injection of her Vivitrol, long acting naltrexone. History of drug abuse  Patient reports she is being followed by Rossi Kahn and will be seeing a counselor in the psychiatrist clinic at [facility]. She reports she has been doing very well. She has not had cravings with the Vivitrol. Patient denies use of any illicit drugs or alcohol. She is working toward potentially an NP track in addiction and mental health. She will be following with nurses and some of the addiction clinics. She does not know how long she will need to be on the Vivtrol and will be up to be health professionals board. Patient reports that she thinks the reason why she used drugs was because her depression but primarily her anxiety was not well controlled. She reports her depression and anxiety is well controlled now. Situational stress  Patient reports that her mother who is also a patient at this clinic was writing and fell. She had a stick that went into her face and missed her eye. Patient's mother is on IV antibiotics. Depression  Patient reports she is still seeing psychiatrist, Dr. Hossein Whitaker. She reports her energy level is better. She is sleeping well. She does not have any side effects on her BuSpar or Prozac. 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 She prefers to work in the evening so this works for her schedule. BMI  Labs reviewed with patient. Lost weight with exercise  She is doing a keto diet      Vitamin D deficiency  Patient has been taking 5000 international units/day since January. She denies any bleeding or bruising. Hyperlipidemia  Patient prefers not to be on any cholesterol medicine. Her mother has cholesterol issues as well. Her mother is on a statin and optimally controlled. Past Medical History:   Diagnosis Date    Anxiety 2010    Bee sting allergy     severe. [facility] Mario Nicole [facility]: kidney disease 11/24/2012    Lyme disease     Musculoskeletal disorder 2009    Dislocated Rt shoulder    Recurrent UTI     hx pyelo. saw urology. took macrodantin age [age] year     Past Surgical History:   Procedure Laterality Date    HX TONSILLECTOMY  2002    SHOULDER SURG PROC UNLISTED Right 12/2010    torn labrum and tendon repair/right     Social History     Socioeconomic History    Marital status: SINGLE     Spouse name: Not on file    Number of children: Not on file    Years of education: Not on file    Highest education level: Not on file   Occupational History    Occupation: [employer]   Tobacco Use    Smoking status: Never Smoker    Smokeless tobacco: Never Used   Substance and Sexual Activity    Alcohol use: No     Comment: no ETOH since 12/1/2017    Drug use: Yes     Types: Opiates     Comment: sober since 12/1/2017    Sexual activity: Yes     Birth control/protection: Pill   Social History Narrative    Working full time at [address] [place] [address], 40 hours/week + overtime    Not doing night shifts        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  busPIRone (BUSPAR) 10 mg tablet TAKE 1 TABLET BY MOUTH 3 TIMES A DAY WITH MEALS 90 Tab 0    FLUoxetine (PROZAC) 20 mg tablet Take 4 Tabs by mouth daily. 180 Tab 0    norethindrone ac-eth estradiol (BETO 1.5/30, 21,) 1.5-30 mg-mcg tab Take  by mouth.       ibuprofen (MOTRIN) 600 mg tablet Take 1 Tab by mouth every eight (8) hours as needed for Pain. 30 Tab 0    dicyclomine (BENTYL) 10 mg capsule Take 10 mg by mouth four (4) times daily. Allergies   Allergen Reactions    Bee Sting Kit Anaphylaxis     And wasps    Cefepime Hives and Swelling    Cephalosporins Anaphylaxis    Ciprofloxacin Anaphylaxis    Rocephin [Ceftriaxone] Hives    Zithromax [Azithromycin] Hives       Review of Systems - General ROS: negative for - chills, fatigue, fever, hot flashes, malaise, night sweats or sleep disturbance  Cardiovascular ROS: no chest pain or dyspnea on exertion  Respiratory ROS: no cough, shortness of breath, or wheezing    Visit Vitals  /77 (BP 1 Location: Left arm, BP Patient Position: Sitting)   Pulse 89   Temp 98.4 °F (36.9 °C) (Oral)   Resp 12   Ht 5' 4\" (1.626 m)   Wt 253 lb 12.8 oz (115.1 kg)   SpO2 96%   BMI 43.56 kg/m²     General Appearance:  Well developed, well nourished,alert and oriented x 3, and individual in no acute distress. Ears/Nose/Mouth/Throat:   Hearing grossly normal.         Neck: Supple, no lad, no bruits   Chest:   Lungs clear to auscultation bilaterally. Cardiovascular:  Regular rate and rhythm, S1, S2 normal, no murmur. Abdomen:   Soft, non-tender, bowel sounds are active. Extremities: No edema bilaterally. Skin: Warm and dry, no suspicious lesions                 Diagnoses and all orders for this visit:    1. Depression, unspecified depression type  Stable  Patient desires to continue in the health professions possibly in mental health    2. Vitamin D deficiency  Patient is taking 5000 international units of vitamin D3 since January. Discussed with her and she will take this through April and then transition to 1000 international units in May. 3. Anxiety  Continue on Prozac and BuSpar    4. BMI 40.0-44.9, adult ([address]  Discussed low-carb diet  Patient is exercising as tolerated    5.  History of drug abuse  Vivitrol was given to patient  Follow-up in 1 month  Patient is doing very well with treatment process    Follow-up in 1 month or earlier if needed

## 2019-04-12 ENCOUNTER — HOSPITAL ENCOUNTER (OUTPATIENT)
Dept: MRI IMAGING | Age: 24
Discharge: HOME | End: 2019-04-12
Attending: FAMILY MEDICINE
Payer: COMMERCIAL

## 2019-04-12 ENCOUNTER — HOSPITAL ENCOUNTER (OUTPATIENT)
Dept: GENERAL RADIOLOGY | Age: 24
Discharge: HOME | End: 2019-04-12
Attending: FAMILY MEDICINE
Payer: COMMERCIAL

## 2019-04-12 DIAGNOSIS — M24.811 INTERNAL DERANGEMENT OF RIGHT SHOULDER: ICD-10-CM

## 2019-04-12 DIAGNOSIS — F19.10 SUBSTANCE ABUSE (HCC): ICD-10-CM

## 2019-04-12 PROCEDURE — 74011636320 HC RX REV CODE- 636/320: Performed by: RADIOLOGY

## 2019-04-12 PROCEDURE — 74011250636 HC RX REV CODE- 250/636

## 2019-04-12 PROCEDURE — 73222 MRI JOINT UPR EXTREM W/DYE: CPT

## 2019-04-12 PROCEDURE — 74011000250 HC RX REV CODE- 250: Performed by: RADIOLOGY

## 2019-04-12 PROCEDURE — A9585 GADOBUTROL INJECTION: HCPCS

## 2019-04-12 PROCEDURE — 23350 INJECTION FOR SHOULDER X-RAY: CPT

## 2019-04-12 RX ORDER — SODIUM BICARBONATE 42 MG/ML
2 INJECTION, SOLUTION INTRAVENOUS
Status: COMPLETED | OUTPATIENT
Start: 2019-04-12 | End: 2019-04-12

## 2019-04-12 RX ORDER — SODIUM CHLORIDE 9 MG/ML
3 INJECTION INTRAMUSCULAR; INTRAVENOUS; SUBCUTANEOUS
Status: COMPLETED | OUTPATIENT
Start: 2019-04-12 | End: 2019-04-12

## 2019-04-12 RX ORDER — LIDOCAINE HYDROCHLORIDE 20 MG/ML
INJECTION, SOLUTION EPIDURAL; INFILTRATION; INTRACAUDAL; PERINEURAL
Status: COMPLETED
Start: 2019-04-12 | End: 2019-04-12

## 2019-04-12 RX ADMIN — IOHEXOL 15 ML: 300 INJECTION, SOLUTION INTRAVENOUS at 15:41

## 2019-04-12 RX ADMIN — SODIUM CHLORIDE 4 ML: 9 INJECTION, SOLUTION INTRAMUSCULAR; INTRAVENOUS; SUBCUTANEOUS at 15:41

## 2019-04-12 RX ADMIN — SODIUM BICARBONATE 210 MG: 42 INJECTION, SOLUTION INTRAVENOUS at 15:41

## 2019-04-12 RX ADMIN — LIDOCAINE HYDROCHLORIDE 10 ML: 20 INJECTION, SOLUTION INTRAVENOUS at 15:40

## 2019-04-12 RX ADMIN — GADOBUTROL 2 ML: 604.72 INJECTION INTRAVENOUS at 16:02

## 2019-04-18 DIAGNOSIS — F19.10 SUBSTANCE ABUSE (HCC): ICD-10-CM

## 2019-04-18 NOTE — TELEPHONE ENCOUNTER
Lauren MILTON [facility] Front Office Pool             [place],tech,With [facility] Order pharmacy requesting a call back regarding placing order for Rx\"Vivitol 380 mg\".  Best contact:[phone]

## 2019-04-22 ENCOUNTER — TELEPHONE (OUTPATIENT)
Dept: INTERNAL MEDICINE CLINIC | Age: 24
End: 2019-04-22

## 2019-04-22 NOTE — TELEPHONE ENCOUNTER
Iqra a Tech with [facility] states they need to speak with the nurse to set up a delivery for patient's Vivitrol Inj medication.  She can be reached at [phone]

## 2019-05-08 ENCOUNTER — TELEPHONE (OUTPATIENT)
Dept: INTERNAL MEDICINE CLINIC | Age: 24
End: 2019-05-08

## 2019-05-08 NOTE — TELEPHONE ENCOUNTER
Spoke to pharmacy tech, [facility] will be mailing out the Vivitrol for delivery today. Order tracking number is [id number].

## 2019-05-08 NOTE — TELEPHONE ENCOUNTER
Miguel Angel Schmidt a [employer] would like to speak with the nurse to set up a delivery for patient's Vivitrol medication , she can be reached at [phone]

## 2019-05-09 ENCOUNTER — TELEPHONE (OUTPATIENT)
Dept: INTERNAL MEDICINE CLINIC | Age: 24
End: 2019-05-09

## 2019-05-09 NOTE — TELEPHONE ENCOUNTER
Attempted to reach pt to get her copay assistance ID number, no answer, unable to leave a voicemail as her mailbox is full. Returned call to [facility], advised them that I do not have the assistance card ID and was unable to reach pt. They have also attempted to reach pt with no answer and no return call. The pharmacy tech at [facility] explains that the patient has a balance and if they can not get in touch with the patient they will not be able to send her medications any longer. I advised them that I will continue to try to reach pt to have her contact [facility] about her medication.

## 2019-05-09 NOTE — TELEPHONE ENCOUNTER
[facility] called asking what the number was for patients co pay assist card for Vivitrol injection.      Please call back and reference pt's acct # [id number]    [phone]

## 2019-05-10 ENCOUNTER — APPOINTMENT (OUTPATIENT)
Dept: CT IMAGING | Age: 24
End: 2019-05-10
Attending: NURSE PRACTITIONER
Payer: COMMERCIAL

## 2019-05-10 ENCOUNTER — HOSPITAL ENCOUNTER (EMERGENCY)
Age: 24
Discharge: HOME | End: 2019-05-10
Attending: EMERGENCY MEDICINE | Admitting: EMERGENCY MEDICINE
Payer: COMMERCIAL

## 2019-05-10 VITALS
SYSTOLIC BLOOD PRESSURE: 105 MMHG | DIASTOLIC BLOOD PRESSURE: 63 MMHG | OXYGEN SATURATION: 96 % | WEIGHT: 250 LBS | TEMPERATURE: 98.2 F | RESPIRATION RATE: 18 BRPM | BODY MASS INDEX: 42.68 KG/M2 | HEIGHT: 64 IN | HEART RATE: 68 BPM

## 2019-05-10 DIAGNOSIS — K62.5 RECTAL BLEEDING: Primary | ICD-10-CM

## 2019-05-10 LAB
ANION GAP SERPL CALC-SCNC: 7 MMOL/L (ref 5–15)
BASOPHILS # BLD: 0 K/UL (ref 0–0.1)
BASOPHILS NFR BLD: 0 % (ref 0–1)
BUN SERPL-MCNC: 13 MG/DL (ref 6–20)
BUN/CREAT SERPL: 16 (ref 12–20)
CALCIUM SERPL-MCNC: 9.5 MG/DL (ref 8.5–10.1)
CHLORIDE SERPL-SCNC: 102 MMOL/L (ref 97–108)
CO2 SERPL-SCNC: 27 MMOL/L (ref 21–32)
COMMENT, HOLDF: NORMAL
CREAT SERPL-MCNC: 0.83 MG/DL (ref 0.55–1.02)
DIFFERENTIAL METHOD BLD: ABNORMAL
EOSINOPHIL # BLD: 0.2 K/UL (ref 0–0.4)
EOSINOPHIL NFR BLD: 2 % (ref 0–7)
ERYTHROCYTE [DISTWIDTH] IN BLOOD BY AUTOMATED COUNT: 12.3 % (ref 11.5–14.5)
GLUCOSE SERPL-MCNC: 93 MG/DL (ref 65–100)
HCG UR QL: NEGATIVE
HCT VFR BLD AUTO: 43.1 % (ref 35–47)
HGB BLD-MCNC: 14 G/DL (ref 11.5–16)
IMM GRANULOCYTES # BLD AUTO: 0.1 K/UL (ref 0–0.04)
IMM GRANULOCYTES NFR BLD AUTO: 0 % (ref 0–0.5)
LYMPHOCYTES # BLD: 2.3 K/UL (ref 0.8–3.5)
LYMPHOCYTES NFR BLD: 17 % (ref 12–49)
MCH RBC QN AUTO: 28 PG (ref 26–34)
MCHC RBC AUTO-ENTMCNC: 32.5 G/DL (ref 30–36.5)
MCV RBC AUTO: 86.2 FL (ref 80–99)
MONOCYTES # BLD: 0.9 K/UL (ref 0–1)
MONOCYTES NFR BLD: 7 % (ref 5–13)
NEUTS SEG # BLD: 9.8 K/UL (ref 1.8–8)
NEUTS SEG NFR BLD: 74 % (ref 32–75)
NRBC # BLD: 0 K/UL (ref 0–0.01)
NRBC BLD-RTO: 0 PER 100 WBC
PLATELET # BLD AUTO: 370 K/UL (ref 150–400)
PMV BLD AUTO: 9.3 FL (ref 8.9–12.9)
POTASSIUM SERPL-SCNC: 3.8 MMOL/L (ref 3.5–5.1)
RBC # BLD AUTO: 5 M/UL (ref 3.8–5.2)
SAMPLES BEING HELD,HOLD: NORMAL
SODIUM SERPL-SCNC: 136 MMOL/L (ref 136–145)
WBC # BLD AUTO: 13.4 K/UL (ref 3.6–11)

## 2019-05-10 PROCEDURE — 74011250637 HC RX REV CODE- 250/637: Performed by: NURSE PRACTITIONER

## 2019-05-10 PROCEDURE — 96375 TX/PRO/DX INJ NEW DRUG ADDON: CPT

## 2019-05-10 PROCEDURE — 74011250636 HC RX REV CODE- 250/636: Performed by: EMERGENCY MEDICINE

## 2019-05-10 PROCEDURE — 74011636320 HC RX REV CODE- 636/320: Performed by: RADIOLOGY

## 2019-05-10 PROCEDURE — 81025 URINE PREGNANCY TEST: CPT

## 2019-05-10 PROCEDURE — 96374 THER/PROPH/DIAG INJ IV PUSH: CPT

## 2019-05-10 PROCEDURE — 96361 HYDRATE IV INFUSION ADD-ON: CPT

## 2019-05-10 PROCEDURE — 85025 COMPLETE CBC W/AUTO DIFF WBC: CPT

## 2019-05-10 PROCEDURE — 99283 EMERGENCY DEPT VISIT LOW MDM: CPT

## 2019-05-10 PROCEDURE — 74011250636 HC RX REV CODE- 250/636: Performed by: NURSE PRACTITIONER

## 2019-05-10 PROCEDURE — 74178 CT ABD&PLV WO CNTR FLWD CNTR: CPT

## 2019-05-10 PROCEDURE — 80048 BASIC METABOLIC PNL TOTAL CA: CPT

## 2019-05-10 PROCEDURE — 36415 COLL VENOUS BLD VENIPUNCTURE: CPT

## 2019-05-10 RX ORDER — ACETAMINOPHEN 500 MG
1000 TABLET ORAL
COMMUNITY
End: 2019-10-29 | Stop reason: ALTCHOICE

## 2019-05-10 RX ORDER — HYDROCODONE BITARTRATE AND ACETAMINOPHEN 5; 325 MG/1; MG/1
1 TABLET ORAL
COMMUNITY
End: 2019-05-15 | Stop reason: ALTCHOICE

## 2019-05-10 RX ORDER — ONDANSETRON HYDROCHLORIDE 8 MG/1
8 TABLET, FILM COATED ORAL
COMMUNITY
End: 2019-06-26 | Stop reason: ALTCHOICE

## 2019-05-10 RX ORDER — FAMOTIDINE 20 MG/1
20 TABLET, FILM COATED ORAL 2 TIMES DAILY
Qty: 20 TAB | Refills: 0 | Status: SHIPPED | OUTPATIENT
Start: 2019-05-10 | End: 2019-05-20

## 2019-05-10 RX ORDER — DICYCLOMINE HYDROCHLORIDE 10 MG/1
10 CAPSULE ORAL
Status: COMPLETED | OUTPATIENT
Start: 2019-05-10 | End: 2019-05-10

## 2019-05-10 RX ORDER — DIPHENHYDRAMINE HYDROCHLORIDE 50 MG/ML
25 INJECTION, SOLUTION INTRAMUSCULAR; INTRAVENOUS
Status: COMPLETED | OUTPATIENT
Start: 2019-05-10 | End: 2019-05-10

## 2019-05-10 RX ORDER — ONDANSETRON 2 MG/ML
4 INJECTION INTRAMUSCULAR; INTRAVENOUS
Status: COMPLETED | OUTPATIENT
Start: 2019-05-10 | End: 2019-05-10

## 2019-05-10 RX ORDER — ASPIRIN 325 MG
325 TABLET ORAL 2 TIMES DAILY
COMMUNITY
End: 2019-05-10

## 2019-05-10 RX ADMIN — IOPAMIDOL 100 ML: 755 INJECTION, SOLUTION INTRAVENOUS at 14:27

## 2019-05-10 RX ADMIN — SODIUM CHLORIDE 1000 ML: 900 INJECTION, SOLUTION INTRAVENOUS at 11:47

## 2019-05-10 RX ADMIN — ONDANSETRON 4 MG: 2 INJECTION INTRAMUSCULAR; INTRAVENOUS at 11:45

## 2019-05-10 RX ADMIN — DICYCLOMINE HYDROCHLORIDE 10 MG: 10 CAPSULE ORAL at 13:56

## 2019-05-10 RX ADMIN — DIPHENHYDRAMINE HYDROCHLORIDE 25 MG: 50 INJECTION, SOLUTION INTRAMUSCULAR; INTRAVENOUS at 15:18

## 2019-05-10 NOTE — ED TRIAGE NOTES
Rectal bleeding and abdominal pain started last night with BM. Taking aspirin after shoulder surgery.

## 2019-05-10 NOTE — ED PROVIDER NOTES
I have evaluated the patient as the Provider in Triage. I have reviewed Her vital signs and the triage nurse assessment. I have talked with the patient and any available family and advised that I am the provider in triage and have ordered the appropriate study to initiate their work up based on the clinical presentation during my assessment. I have advised that the patient will be accommodated in the Main ED as soon as possible. I have also requested to contact the triage nurse or myself immediately if the patient experiences any changes in their condition during this brief waiting period. Pt is 3 days s/p right shoulder surgery and has been taking ASA and Hydrocodone. She began experiencing abdominal pain and noticed blood in her stool last night. Pt had a colonoscopy 4 years ago showing internal hemorrhoids. Pt last took Hydrocodone ~8 hours ago. Note written by Edison Gleason, as dictated by Ayo Coles MD 10:39 AM 
 
21 y.o. female with past medical history significant for dislocated right shoulder and kidney disease who presents via private vehicle from home accompanied by her mother with chief complaint of rectal bleeding. Patient reports having right shoulder surgery 3 days ago for a torn rotator cuff for which she has been taking hydrocodone and asa - now reports LLQ \"stabbing\" abdominal pain, rectal bleeding, and n/v/d since last night. Patient called her surgeon this morning who advised her to discontinue the asa d/t bleeding and has been alternating hydrocodone and acetaminophen since that with relief of pain. Patient c/o increased generalized weakness and fatigue this morning with \"large clots\" of bright red blood when using the bathroom - notes diarrhea has since resolved and is now just blood. Patient endorses Hx of IBS. Patient also reports decreased PO/liquids. Patient denies fever and chills. There are no other acute medical concerns at this time. Social hx: Never tobacco smoker; Denies EtOH use; Denies illicit drug use PCP: Lizz Carpenter MD 
 
Note written by Edison Will, as dictated by Sue Soni NP 1:18 PM 
 
The history is provided by the patient. No  was used. Past Medical History:  
Diagnosis Date  Anxiety 2010  Bee sting allergy   
 severe. Dr. Parrish Gayle at [facility]  FH: kidney disease 11/24/2012  Lyme disease  Musculoskeletal disorder 2009 Dislocated Rt shoulder  Recurrent UTI   
 hx pyelo. saw urology. took macrodantin age [age] year Past Surgical History:  
Procedure Laterality Date  HX TONSILLECTOMY  2002  SHOULDER SURG PROC UNLISTED Right 12/2010  
 torn labrum and tendon repair/right Family History:  
Problem Relation Age of Onset  Anxiety Mother  Kidney Disease Paternal Grandmother   
     also in cousins  Cancer Maternal Grandmother   
     skin Social History Socioeconomic History  Marital status: SINGLE Spouse name: Not on file  Number of children: Not on file  Years of education: Not on file  Highest education level: Not on file Occupational History  Occupation: [employer] Social Needs  Financial resource strain: Not on file  Food insecurity:  
  Worry: Not on file Inability: Not on file  Transportation needs:  
  Medical: Not on file Non-medical: Not on file Tobacco Use  Smoking status: Never Smoker  Smokeless tobacco: Never Used Substance and Sexual Activity  Alcohol use: No  
  Comment: no ETOH since 12/1/2017  Drug use: Yes Types: Opiates Comment: sober since 12/1/2017  Sexual activity: Yes Birth control/protection: Pill Lifestyle  Physical activity:  
  Days per week: Not on file Minutes per session: Not on file  Stress: Not on file Relationships  Social connections:  
  Talks on phone: Not on file Gets together: Not on file Attends Islam service: Not on file Active member of club or organization: Not on file Attends meetings of clubs or organizations: Not on file Relationship status: Not on file  Intimate partner violence:  
  Fear of current or ex partner: Not on file Emotionally abused: Not on file Physically abused: Not on file Forced sexual activity: Not on file Other Topics Concern  Not on file Social History Narrative Working full time at [address] 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 Negative for appetite change, chills, diaphoresis and fever. HENT: Negative for congestion, ear discharge, ear pain, sinus pressure, sinus pain, sore throat and trouble swallowing. Eyes: Negative for photophobia, pain, redness and visual disturbance. Respiratory: Negative for chest tightness, shortness of breath and wheezing. Cardiovascular: Negative for chest pain and palpitations. Gastrointestinal: Positive for abdominal pain (LLQ), anal bleeding, diarrhea, nausea and vomiting. Negative for abdominal distention. Endocrine: Negative. Genitourinary: Negative for difficulty urinating, flank pain, frequency and urgency. Musculoskeletal: Negative for back pain, neck pain and neck stiffness. Skin: Negative for color change, pallor, rash and wound. Allergic/Immunologic: Negative. Neurological: Positive for weakness (generalized). Negative for dizziness, speech difficulty and headaches. Hematological: Does not bruise/bleed easily. Psychiatric/Behavioral: Negative for behavioral problems. The patient is not nervous/anxious. All other systems reviewed and are negative. Vitals:  
 05/10/19 1039 BP: 119/78 Pulse: (!) 111 Resp: 20 Temp: 98.2 °F (36.8 °C) SpO2: 96% Weight: 113.4 kg (250 lb) Height: 5' 4\" (1.626 m) Physical Exam  
Constitutional: She is oriented to person, place, and time. She appears well-developed and well-nourished. No distress. HENT:  
Head: Normocephalic and atraumatic. Right Ear: External ear normal.  
Left Ear: External ear normal.  
Nose: Nose normal.  
Mouth/Throat: Oropharynx is clear and moist.  
Eyes: Pupils are equal, round, and reactive to light. Conjunctivae and EOM are normal. Right eye exhibits no discharge. Left eye exhibits no discharge. Neck: Normal range of motion. Neck supple. No JVD present. No tracheal deviation present. Cardiovascular: Regular rhythm, normal heart sounds and intact distal pulses. Exam reveals no gallop. No murmur heard. Tachycardia 110's Pulmonary/Chest: Effort normal and breath sounds normal. No respiratory distress. She has no wheezes. She has no rales. She exhibits no tenderness. Abdominal: Soft. Bowel sounds are normal. She exhibits no distension. There is tenderness. There is no rebound and no guarding. Genitourinary:  
Genitourinary Comments: Negative BRB per rectum. Musculoskeletal: Normal range of motion. She exhibits no edema or tenderness. Neurological: She is alert and oriented to person, place, and time. Skin: Skin is warm and dry. No rash noted. No erythema. No pallor. Psychiatric: She has a normal mood and affect. Her behavior is normal. Judgment and thought content normal.  
Nursing note and vitals reviewed. MDM Number of Diagnoses or Management Options Rectal bleeding: new and requires workup Diagnosis management comments: Plan: 
Moe Daugherty. Pepcid po BID Discharge to home and follow up with GI as an outpatient. Return with worsening symptoms. Patient in agreement with plan of care. Amount and/or Complexity of Data Reviewed Clinical lab tests: ordered and reviewed Tests in the radiology section of CPT®: ordered and reviewed Discuss the patient with other providers: yes (Discussed plan of care with Dr. Dmitry Hamm.) 3:15 PM 
Pt has been reexamined. Pt has no new complaints, changes or physical findings. Care plan outlined and precautions discussed. All available results were reviewed with pt. All medications were reviewed with pt. All of pt's questions and concerns were addressed. Pt agrees to F/U as instructed and agrees to return to ED upon further deterioration. Pt is ready to go home. Anthony Jiang NP Procedures

## 2019-05-10 NOTE — ED NOTES
Ultrasound IV by ED Staff Procedure Note Patient meets criteria for US IV insertion. Ultrasound IV verbal education provided to patient. Opportunities for questions given. IV ultrasound technique used for PIV placement: 
20gauge 
rac location. 1 X Attempt(s). Procedure tolerated well. Primary RN aware of IV placement and added to LDA.   
 
                           Ranjana Ladd RN

## 2019-05-10 NOTE — ED NOTES
With MD approval, patient took her own hydrocodone for pain while waiting for a room. One PO tab 5/325.

## 2019-05-10 NOTE — TELEPHONE ENCOUNTER
Steve Denton is calling back to follow up , gave msge below that the nurse does not have the assistance Card ID and is also trying to get a hold of the patient .

## 2019-05-10 NOTE — ED NOTES
The patient displayed an understanding in discharge information and has no further questions at the time of discharge. The patient was discharged home with (1) prescription(s). The patient appears to be in no respiratory distress at the time of discharge. The patient is ambulatory out of ER with (her Mom) by her side.

## 2019-05-10 NOTE — PROGRESS NOTES
[facility]: MED RECONCILIATION Comments/Recommendations:  
Patient reports compliance with current medications. Has not taken her fluoxetine or buspirone today, but did take dicyclomine (while in the ED). Patient was prescribed  mg PO BID following rotator cuff surgery. Her last dose was yesterday morning - she stopped d/t bleeding. She was also advised to avoid other NSAIDs during this time. She takes OC's at bedtime and can likely provide this if patient is admitted. Naltrexone (Vivitrol) 380 mg ER monthly injection - Patient gets this injection monthly for opioid dependence. Last dose was 4/2/19 - she was asked to hold monthly dose for rotator cuff surgery however. Her next dose is scheduled for 5/20/19. Please note that concurrent administration or the administration of naltrexone within 7-10 days of opioids may induce acute abstinence syndrome or exacerbate a pre-existing subclinical abstinence syndrome. (1) Patients taking naltrexone may not experience beneficial effects of opioid-containing medications. (2) Medications added: Ondansetron 8 mg PO Q6H PRN N/V Norco 5/325 mg PO Q6H PRN pain (last dose was at 1115 in the ED with MD permission) Aspirin 325 mg PO BID (prescribed post-op following rotator cuff surgery) - last dose was yesterday morning (stopped taking d/t bleeding) APAP 1000 mg PO Q6H PRN (takes between doses of Norco) Medications removed: 
 
Ibuprofen - pt told not to take this following surgery Medications adjusted: 
 
none Information obtained from: patient, mother, Rx query Significant PMH/Disease States:  
Past Medical History:  
Diagnosis Date Anxiety 2010 Bee sting allergy   
 severe. Dr. Silvano Boyd at [facility] FH: kidney disease 11/24/2012 Lyme disease Musculoskeletal disorder 2009 Dislocated Rt shoulder Recurrent UTI   
 hx pyelo. saw urology. took macrodantin age [age] year Chief Complaint for this Admission: Chief Complaint Patient presents with Rectal Bleeding Allergies: Bee sting kit; Cefepime; Cephalosporins; Ciprofloxacin; Rocephin [ceftriaxone]; and Zithromax [azithromycin] Prior to Admission Medications:  
 
Medication Documentation Review Audit Reviewed by France Atkins, PHARMD (Pharmacist) on 05/10/19 at 2674 Medication Sig Documenting Provider Last Dose Status Taking?  
acetaminophen (TYLENOL) 500 mg tablet Take 1,000 mg by mouth every six (6) hours as needed for Pain. Provider, Historical 5/9/2019 pm Active Yes Med Note (Page Crease   Fri May 10, 2019  2:47 PM) Takes between doses of Norco  
aspirin (ASPIRIN) 325 mg tablet Take 325 mg by mouth two (2) times a day. Provider, Historical 5/9/2019 am Active Yes Med Note (Page Crease   Fri May 10, 2019  2:47 PM) Prescribed post-op after rotator cuff surgery  
busPIRone (BUSPAR) 10 mg tablet TAKE 1 TABLET BY MOUTH 3 TIMES A DAY WITH MEALS Chilo Millan MD 5/9/2019 pm Active Yes  
dicyclomine (BENTYL) 10 mg capsule Take 10 mg by mouth four (4) times daily. Provider, Historical 5/10/2019 am Active Yes FLUoxetine (PROZAC) 20 mg tablet Take 4 Tabs by mouth daily. John Chanel MD 5/9/2019 am Active Yes HYDROcodone-acetaminophen (NORCO) 5-325 mg per tablet Take 1 Tab by mouth every six (6) hours as needed for Pain. Provider, Historical 5/10/2019 1115 Active Yes  
hydrOXYzine pamoate (VISTARIL) 25 mg capsule Take 25 mg by mouth three (3) times daily as needed for Anxiety. Provider, Historical  Active Yes  
naltrexone microspheres (VIVITROL) 380 mg ER injection INJECT 380MG INTRAMUSCULARLY EVERY 4 WEEKS OR EVERY 1 MONTH Chilo Millan MD 4/2/2019 Active Yes Med Note (Page Crease   Fri May 10, 2019  2:45 PM) Held for rotator cuff surgery 5/2019. Next dose is scheduled for 5/20/2019.   
norethindrone ac-eth estradiol (BETO 1.5/30, 21,) 1.5-30 mg-mcg tab Take 1 Tab by mouth nightly. Other, MD Elvis 5/9/2019 pm Active Yes  
ondansetron hcl (ZOFRAN) 8 mg tablet Take 8 mg by mouth every six (6) hours as needed for Nausea. Provider, Historical 5/10/2019 1000 Active Yes Thank you for the consult, 
Larry LOPEZ, [address]

## 2019-05-10 NOTE — DISCHARGE INSTRUCTIONS
Patient Education      Please follow up with your orthopedic doctor for post operative care. Rectal Bleeding: Care Instructions  Your Care Instructions    Rectal bleeding in small amounts is common. You may see red spotting on toilet paper or drops of blood in the toilet. Rectal bleeding has many possible causes, from something as minor as hemorrhoids to something as serious as colon cancer. You may need more tests to find the cause of your bleeding. Follow-up care is a key part of your treatment and safety. Be sure to make and go to all appointments, and call your doctor if you are having problems. It's also a good idea to know your test results and keep a list of the medicines you take. How can you care for yourself at home? · Avoid aspirin and other nonsteroidal anti-inflammatory drugs (NSAIDs), such as ibuprofen (Advil, Motrin) and naproxen (Aleve). They can cause you to bleed more. Ask your doctor if you can take acetaminophen (Tylenol). Read and follow all instructions on the label. · Use a stool softener that contains bran or psyllium. You can save money by buying bran or psyllium (available in bulk at most health food stores) and sprinkling it on foods or stirring it into fruit juice. You can also use a product such as Metamucil or Citrucel. · Take your medicines exactly as directed. Call your doctor if you think you are having a problem with your medicine. When should you call for help? Call 911 anytime you think you may need emergency care. For example, call if:    · You passed out (lost consciousness).    Call your doctor now or seek immediate medical care if:    · You have new or worse pain.     · You have new or worse bleeding from the rectum.     · You are dizzy or light-headed, or you feel like you may faint.    Watch closely for changes in your health, and be sure to contact your doctor if:    · You cannot pass stools or gas.     · You do not get better as expected.    Where can you learn more?  Go to http://jaqui-anna.info/. Enter L215 in the search box to learn more about \"Rectal Bleeding: Care Instructions. \"  Current as of: March 27, 2018  Content Version: 11.9  © 6497-7150 [employer], [employer]. Care instructions adapted under license by [employer] (which disclaims liability or warranty for this information). If you have questions about a medical condition or this instruction, always ask your healthcare professional. [address] any warranty or liability for your use of this information.

## 2019-05-15 ENCOUNTER — OFFICE VISIT (OUTPATIENT)
Dept: INTERNAL MEDICINE CLINIC | Age: 24
End: 2019-05-15

## 2019-05-15 VITALS
WEIGHT: 250 LBS | BODY MASS INDEX: 42.68 KG/M2 | TEMPERATURE: 98.2 F | HEART RATE: 99 BPM | DIASTOLIC BLOOD PRESSURE: 80 MMHG | OXYGEN SATURATION: 97 % | HEIGHT: 64 IN | SYSTOLIC BLOOD PRESSURE: 109 MMHG | RESPIRATION RATE: 12 BRPM

## 2019-05-15 DIAGNOSIS — Z91.030 H/O BEE STING ALLERGY: ICD-10-CM

## 2019-05-15 DIAGNOSIS — F19.91 HISTORY OF DRUG USE: ICD-10-CM

## 2019-05-15 DIAGNOSIS — M25.511 RIGHT SHOULDER PAIN, UNSPECIFIED CHRONICITY: Primary | ICD-10-CM

## 2019-05-15 RX ORDER — EPINEPHRINE 0.3 MG/.3ML
0.3 INJECTION SUBCUTANEOUS
Qty: 2 SYRINGE | Refills: 4 | Status: SHIPPED | OUTPATIENT
Start: 2019-05-15 | End: 2019-05-15

## 2019-05-15 NOTE — PROGRESS NOTES
Chief Complaint   Patient presents with    Injection     Vivitrol injection     Right shoulder surgery  Patient reports she fell about a month ago. She had right shoulder surgery and required Norco for 6 days. Her pain is currently 0 out of 10 when she does not move it. She will have rehab in about 4 weeks. She will see her orthopedic surgeon in 5 days. She reports her pain is controlled on Tylenol arthritis. She is taking 4 times per day. History of drug use  Patient is still being followed by her counselor. She is doing well. She did not take the vivitrol last month due to her surgery. History of bee sting allergy  Patient needs a refill of her EpiPen    Anxiety  Patient reports she is doing very well. She is still seeing her psychiatrist.  She has not had any medication changes    Past Medical History:   Diagnosis Date    Anxiety 2010    Bee sting allergy     severe. [facility] Samuel Nobles at [facility]: kidney disease 11/24/2012    Lyme disease     Musculoskeletal disorder 2009    Dislocated Rt shoulder    Recurrent UTI     hx pyelo. saw urology.   took macrodantin age [age] year     Past Surgical History:   Procedure Laterality Date    HX ROTATOR CUFF REPAIR Right 05/07/2019    HX TONSILLECTOMY  2002    SHOULDER SURG PROC UNLISTED Right 12/2010    torn labrum and tendon repair/right     Social History     Socioeconomic History    Marital status: SINGLE     Spouse name: Not on file    Number of children: Not on file    Years of education: Not on file    Highest education level: Not on file   Occupational History    Occupation: [employer]   Tobacco Use    Smoking status: Never Smoker    Smokeless tobacco: Never Used   Substance and Sexual Activity    Alcohol use: No     Comment: no ETOH since 12/1/2017    Drug use: Yes     Types: Opiates     Comment: sober since 12/1/2017    Sexual activity: Yes     Birth control/protection: Pill   Social History Narrative    Working full time at [employer], 40 hours/week + overtime    Not doing night shifts        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 2 Syringe 4    ondansetron hcl (ZOFRAN) 8 mg tablet Take 8 mg by mouth every six (6) hours as needed for Nausea.  acetaminophen (TYLENOL) 500 mg tablet Take 1,000 mg by mouth every six (6) hours as needed for Pain.  famotidine (PEPCID) 20 mg tablet Take 1 Tab by mouth two (2) times a day for 10 days. 20 Tab 0    naltrexone microspheres (VIVITROL) 380 mg ER injection INJECT 380MG INTRAMUSCULARLY EVERY 4 WEEKS OR EVERY 1 MONTH 4 Each 3    hydrOXYzine pamoate (VISTARIL) 25 mg capsule Take 25 mg by mouth three (3) times daily as needed for Anxiety.  busPIRone (BUSPAR) 10 mg tablet TAKE 1 TABLET BY MOUTH 3 TIMES A DAY WITH MEALS 90 Tab 0    FLUoxetine (PROZAC) 20 mg tablet Take 4 Tabs by mouth daily. 180 Tab 0    norethindrone ac-eth estradiol (BETO 1.5/30, 21,) 1.5-30 mg-mcg tab Take 1 Tab by mouth nightly.  dicyclomine (BENTYL) 10 mg capsule Take 10 mg by mouth four (4) times daily.        Allergies   Allergen Reactions    Bee Sting Kit Anaphylaxis     And wasps    Cefepime Hives and Swelling    Cephalosporins Anaphylaxis    Ciprofloxacin Anaphylaxis    Quinolones Anaphylaxis    Rocephin [Ceftriaxone] Hives    Zithromax [Azithromycin] Hives       Review of Systems - General ROS: negative for - chills, fatigue, fever, hot flashes or malaise  Cardiovascular ROS: no chest pain or dyspnea on exertion  Respiratory ROS: no cough, shortness of breath, or wheezing    Visit Vitals  /80 (BP 1 Location: Left arm, BP Patient Position: Sitting)   Pulse 99   Temp 98.2 °F (36.8 °C) (Oral)   Resp 12   Ht 5' 4\" (1.626 m)   Wt 250 lb (113.4 kg)   LMP 05/01/2019 (Approximate)   SpO2 97%   BMI 42.91 kg/m²     General Appearance:  Well developed, well nourished,alert and oriented x 3, and individual in no acute distress. Ears/Nose/Mouth/Throat:   Hearing grossly normal.         Neck: Supple, no lad, no bruits   Chest:   Lungs clear to auscultation bilaterally. Cardiovascular:  Regular rate and rhythm, S1, S2 normal, no murmur. Abdomen:   Soft, non-tender, bowel sounds are active. Extremities: No edema bilaterally. Skin: Warm and dry, no suspicious lesions                 Diagnoses and all orders for this visit:    1. Right shoulder pain, unspecified chronicity  Stable  Continue Tylenol do not exceed 3000 mg/day. 2. H/O bee sting allergy  EpiPen written    3. History of drug use  vivitrol given by [facility]    Other orders  -     EPINEPHrine (EPIPEN) 0.3 mg/0.3 mL injection; 0.3 mL by IntraMUSCular route once as needed for Anaphylaxis or Allergic Response for up to 1 dose.       Follow-up in 1 month or earlier if needed

## 2019-06-17 ENCOUNTER — TELEPHONE (OUTPATIENT)
Dept: INTERNAL MEDICINE CLINIC | Age: 24
End: 2019-06-17

## 2019-06-17 NOTE — TELEPHONE ENCOUNTER
----- Message from Mirtha Joyce sent at 6/17/2019 10:37 AM EDT -----  Regarding: Dr. Tom Nguyen: [phone]  Pt has appt for tomorrow at 2:00, and wants to make sure the Vivitrol shot is there before she arrives. Best contact [phone].

## 2019-06-17 NOTE — TELEPHONE ENCOUNTER
Returned call to pt, advised that we have not received the medication. Pt states she will contact [facility] and see if they can overnight the medication so she can keep her appt for tomorrow. She will call me back to let me know if the medication is being shipped or if we need to cancel her appt.

## 2019-06-21 ENCOUNTER — TELEPHONE (OUTPATIENT)
Dept: INTERNAL MEDICINE CLINIC | Age: 24
End: 2019-06-21

## 2019-06-21 NOTE — TELEPHONE ENCOUNTER
I tried calling pt, no answer. LM on VM to call back regarding her appt. When pt calls back I want to let her know that her medication might not be here in time of her appt. We may have to r/s her appt with PCP only.

## 2019-06-21 NOTE — TELEPHONE ENCOUNTER
----- Message from Victor Manuel Frost sent at 6/21/2019  1:52 PM EDT -----  Regarding: Dr. Reji Robins is calling to set up a delivery for the patient [phone].

## 2019-06-21 NOTE — TELEPHONE ENCOUNTER
I spoke with [facility], they don't ship medication on Monday.  They will ship it Tuesday and it should arrive between 8am-12pm.

## 2019-06-25 ENCOUNTER — TELEPHONE (OUTPATIENT)
Dept: INTERNAL MEDICINE CLINIC | Age: 24
End: 2019-06-25

## 2019-06-25 NOTE — TELEPHONE ENCOUNTER
----- Message from Silva Llamas sent at 6/25/2019 11:05 AM EDT -----  Regarding: Dr. Nina Millan/Telephone   Pt is requesting to schedule an appt for the Vivatrol shot, attempted to schedule however pt wanted a sooner appt.  Pt's Best Contact Number: [phone]

## 2019-06-25 NOTE — TELEPHONE ENCOUNTER
PSR called and left message on VM advising patient of appointment at [facility] 6/26/19 w/ DR. Lalito Abraham. PSR asked patient to please call back to confirm she is able to come to this appointment.

## 2019-06-26 ENCOUNTER — TELEPHONE (OUTPATIENT)
Dept: INTERNAL MEDICINE CLINIC | Age: 24
End: 2019-06-26

## 2019-06-26 ENCOUNTER — OFFICE VISIT (OUTPATIENT)
Dept: INTERNAL MEDICINE CLINIC | Age: 24
End: 2019-06-26

## 2019-06-26 ENCOUNTER — DOCUMENTATION ONLY (OUTPATIENT)
Dept: INTERNAL MEDICINE CLINIC | Age: 24
End: 2019-06-26

## 2019-06-26 VITALS
DIASTOLIC BLOOD PRESSURE: 76 MMHG | HEART RATE: 83 BPM | TEMPERATURE: 99.1 F | SYSTOLIC BLOOD PRESSURE: 103 MMHG | RESPIRATION RATE: 18 BRPM | WEIGHT: 248 LBS | HEIGHT: 64 IN | BODY MASS INDEX: 42.34 KG/M2 | OXYGEN SATURATION: 95 %

## 2019-06-26 DIAGNOSIS — Z79.899 MEDICATION MANAGEMENT: Primary | ICD-10-CM

## 2019-06-26 NOTE — PROGRESS NOTES
Chief Complaint   Patient presents with    Injection     Patient is here to follow-up with regards to her anxiety and for her Vivitrol injection      Right shoulder surgery  Patient has recovered and has been out of the sling. She has good range of motion of her right arm. History of drug use  Patient is still being followed by her counselor. She is doing well. Patient will have her injection today. She has finished her class and will be a  if allowed. She is excited about doing this. She will visit [place] with her family and is looking forward to this. She has not had any issues related to alcohol or illicit drugs. History of bee sting allergy  Patient needs a refill of her EpiPen    Anxiety  Patient reports she is doing very well. She is still seeing her psychiatrist.  She has not had any medication changes    Past Medical History:   Diagnosis Date    Anxiety 2010    Bee sting allergy     severe. Dr. Edison Diallo at [facility]: kidney disease 11/24/2012    Lyme disease     Musculoskeletal disorder 2009    Dislocated Rt shoulder    Recurrent UTI     hx pyelo. saw urology.   took macrodantin age [age] year     Past Surgical History:   Procedure Laterality Date    HX ROTATOR CUFF REPAIR Right 05/07/2019    HX TONSILLECTOMY  2002    SHOULDER SURG PROC UNLISTED Right 12/2010    torn labrum and tendon repair/right     Social History     Socioeconomic History    Marital status: SINGLE     Spouse name: Not on file    Number of children: Not on file    Years of education: Not on file    Highest education level: Not on file   Occupational History    Occupation: [employer]   Tobacco Use    Smoking status: Never Smoker    Smokeless tobacco: Never Used   Substance and Sexual Activity    Alcohol use: No     Comment: no ETOH since 12/1/2017    Drug use: Yes     Types: Opiates     Comment: sober since 12/1/2017    Sexual activity: Yes     Birth control/protection: Pill   Social History Narrative Working full time at [employer], 40 hours/week + overtime    Not doing night shifts        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  naltrexone microspheres (VIVITROL) 380 mg ER injection INJECT 380MG INTRAMUSCULARLY EVERY 4 WEEKS OR EVERY 1 MONTH 4 Each 3    hydrOXYzine pamoate (VISTARIL) 25 mg capsule Take 25 mg by mouth three (3) times daily as needed for Anxiety.  busPIRone (BUSPAR) 10 mg tablet TAKE 1 TABLET BY MOUTH 3 TIMES A DAY WITH MEALS 90 Tab 0    FLUoxetine (PROZAC) 20 mg tablet Take 4 Tabs by mouth daily. 180 Tab 0    norethindrone ac-eth estradiol (BETO 1.5/30, 21,) 1.5-30 mg-mcg tab Take 1 Tab by mouth nightly.  dicyclomine (BENTYL) 10 mg capsule Take 10 mg by mouth four (4) times daily. Allergies   Allergen Reactions    Bee Sting Kit Anaphylaxis     And wasps    Cefepime Hives and Swelling    Cephalosporins Anaphylaxis    Ciprofloxacin Anaphylaxis    Quinolones Anaphylaxis    Rocephin [Ceftriaxone] Hives    Zithromax [Azithromycin] Hives       Review of Systems - General ROS: negative for - chills, fatigue, fever, hot flashes or malaise  Cardiovascular ROS: no chest pain or dyspnea on exertion  Respiratory ROS: no cough, shortness of breath, or wheezing    Visit Vitals  /76 (BP 1 Location: Left arm, BP Patient Position: Sitting)   Pulse 83   Temp 99.1 °F (37.3 °C) (Oral)   Resp 18   Ht 5' 4\" (1.626 m)   Wt 248 lb (112.5 kg)   SpO2 95%   BMI 42.57 kg/m²     General Appearance:  Well developed, well nourished,alert and oriented x 3, and individual in no acute distress.    Ears/Nose/Mouth/Throat: Hearing grossly normal.         Neck: Supple, no lad, no bruits   Chest:   Lungs clear to auscultation bilaterally. Cardiovascular:  Regular rate and rhythm, S1, S2 normal, no murmur. Abdomen:   Soft, non-tender, bowel sounds are active. Extremities: No edema bilaterally. Skin: Warm and dry, no suspicious lesions             Diagnoses and all orders for this visit:    1. Medication management  Patient will get her Vivitrol injection today. Berry Tawanda will give this to her. Patient does not have side effects of medications. Her labs were reviewed with her today. She will follow-up in 1 month    2. BMI 40.0-44.9, adult [facility])  Patient has lost 5 pounds since April. She is continuing to exercise. We discussed fatty liver and she is aware. She will continue to exercise and eat a heart healthy diet.       Follow-up in 1 month or earlier if needed

## 2019-06-26 NOTE — TELEPHONE ENCOUNTER
I spoke with patient to advise we didn't make a copy of the signed form from the visit today, if pt could come back if she was close by. Pt states she will fax the form to our office this afternoon. Fax number provided.

## 2019-06-26 NOTE — PROGRESS NOTES
Vivitrol 380mg was administered to patient's Right Gluteus.     LOT [id number]  EXP [id number]  . [facility] 47 [id number]     Pt tolerated well

## 2019-06-27 ENCOUNTER — TELEPHONE (OUTPATIENT)
Dept: INTERNAL MEDICINE CLINIC | Age: 24
End: 2019-06-27

## 2019-06-27 NOTE — TELEPHONE ENCOUNTER
Pharmacy Tech with [address] [facility] [address], would like to scheduled delivery for Vivitrol , states patient will be on vacation and will return 07/22 per patient ,  # [phone]

## 2019-07-23 ENCOUNTER — OFFICE VISIT (OUTPATIENT)
Dept: INTERNAL MEDICINE CLINIC | Age: 24
End: 2019-07-23

## 2019-07-23 VITALS
OXYGEN SATURATION: 95 % | DIASTOLIC BLOOD PRESSURE: 81 MMHG | WEIGHT: 247 LBS | SYSTOLIC BLOOD PRESSURE: 120 MMHG | BODY MASS INDEX: 42.17 KG/M2 | RESPIRATION RATE: 18 BRPM | TEMPERATURE: 98.7 F | HEART RATE: 74 BPM | HEIGHT: 64 IN

## 2019-07-23 DIAGNOSIS — Z51.81 MEDICATION MONITORING ENCOUNTER: ICD-10-CM

## 2019-07-23 DIAGNOSIS — F43.9 SITUATIONAL STRESS: Primary | ICD-10-CM

## 2019-07-23 DIAGNOSIS — F19.11 HISTORY OF DRUG ABUSE IN REMISSION (HCC): ICD-10-CM

## 2019-07-23 NOTE — PROGRESS NOTES
Chief Complaint   Patient presents with    Injection     Patient is here to follow-up with regards to her anxiety and for her Vivitrol injection    Since last visit pt came back from [facility]. She has been released to go back to work. She in interested in  or psych RN. She is still with [employer] and will continue for 3 more years. She will still need Vivitorl monthly. She thinks will be on for another year. She is working with her  and Dr. Katie Nunez to decise how long she will be on it. She started in October 2018. She reports her MH is good. 8-9/10. She has some anxiety about going back to work. bmi   Eating better and wallking  She is eating less carbs      Right shoulder surgery  Patient has recovered and has been out of the sling. She has good range of motion of her right arm. History of drug use  Patient has been compliant with the [facility] program.  She will get her Vivitrol today. Anxiety  Patient reports she is doing very well. She is still seeing her psychiatrist.  She has not had any medication changes. She will not be able to use her Atarax or Vistaril when she is working. She was using this as needed. She notes that she is having some anxiety about the thought of going back to work. Hyperlipidemia  Patient reports that she is going to try to adjust her diet for heart healthy diet. Past Medical History:   Diagnosis Date    Anxiety 2010    Bee sting allergy     severe. [facility] Clint Portillo at [facility]: kidney disease 11/24/2012    Lyme disease     Musculoskeletal disorder 2009    Dislocated Rt shoulder    Recurrent UTI     hx pyelo. saw urology.   took macrodantin age [age] year     Past Surgical History:   Procedure Laterality Date    HX ROTATOR CUFF REPAIR Right 05/07/2019    HX TONSILLECTOMY  2002    SHOULDER SURG PROC UNLISTED Right 12/2010    torn labrum and tendon repair/right     Social History     Socioeconomic History    Marital status: SINGLE Spouse name: Not on file    Number of children: Not on file    Years of education: Not on file    Highest education level: Not on file   Occupational History    Occupation: [employer]   Tobacco Use    Smoking status: Never Smoker    Smokeless tobacco: Never Used   Substance and Sexual Activity    Alcohol use: No     Comment: no ETOH since 12/1/2017    Drug use: Yes     Types: Opiates     Comment: sober since 12/1/2017    Sexual activity: Yes     Birth control/protection: Pill   Social History Narrative    Working full time at [employer], 40 hours/week + overtime    Not doing night shifts        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  naltrexone microspheres (VIVITROL) 380 mg ER injection INJECT 380MG INTRAMUSCULARLY EVERY 4 WEEKS OR EVERY 1 MONTH 4 Each 3    busPIRone (BUSPAR) 10 mg tablet TAKE 1 TABLET BY MOUTH 3 TIMES A DAY WITH MEALS 90 Tab 0    FLUoxetine (PROZAC) 20 mg tablet Take 4 Tabs by mouth daily. 180 Tab 0    norethindrone ac-eth estradiol (BETO 1.5/30, 21,) 1.5-30 mg-mcg tab Take 1 Tab by mouth nightly.  dicyclomine (BENTYL) 10 mg capsule Take 10 mg by mouth four (4) times daily.  hydrOXYzine pamoate (VISTARIL) 25 mg capsule Take 25 mg by mouth three (3) times daily as needed for Anxiety.        Allergies   Allergen Reactions    Bee Sting Kit Anaphylaxis     And wasps    Cefepime Hives and Swelling    Cephalosporins Anaphylaxis    Ciprofloxacin Anaphylaxis    Quinolones Anaphylaxis    Rocephin [Ceftriaxone] Hives    Zithromax [Azithromycin] Hives       Review of Systems - General ROS: negative for - chills, fatigue, fever, hot flashes or malaise  Cardiovascular ROS: no chest pain or dyspnea on exertion  Respiratory ROS: no cough, shortness of breath, or wheezing    Visit Vitals  /81 (BP 1 Location: Left arm, BP Patient Position: Sitting)   Pulse 74   Temp 98.7 °F (37.1 °C) (Oral)   Resp 18   Ht 5' 4\" (1.626 m)   Wt 247 lb (112 kg)   SpO2 95%   BMI 42.40 kg/m²     General Appearance:  Well developed, well nourished,alert and oriented x 3, and individual in no acute distress. Ears/Nose/Mouth/Throat:   Hearing grossly normal.         Neck: Supple, no lad, no bruits   Chest:   Lungs clear to auscultation bilaterally. Cardiovascular:  Regular rate and rhythm, S1, S2 normal, no murmur. Abdomen:   Soft, non-tender, bowel sounds are active. Extremities: No edema bilaterally. Skin: Warm and dry, no suspicious lesions             Diagnoses and all orders for this visit:    1. Situational stress/anxiety  Discussed with patient that she may want to bump up her BuSpar as she will not have access to her Vistaril or Atarax when she is at work. She will follow-up with her psychiatrist and ask her as well. 2. Medication monitoring encounter  Patient has been on Vivitrol/long-acting naltrexone for over 9 months. We will recheck her liver  -     METABOLIC PANEL, COMPREHENSIVE    3. History of drug abuse in remission  Patient is doing great. Encouraged continued great positive behaviors. BMI 42  Encouraged heart healthy diet and exercise as tolerated.   Lipids discussed with patient  We will continue to monitor    Follow-up in 1 month or earlier if needed

## 2019-07-23 NOTE — PROGRESS NOTES
Vivitrol 380mg IM administered to pts left gluteus with 20 gauge 1 1/2 in needle. Pt tolerated well. Vivitrol NDC [id number] LOT [id number] Exp 9/30/2021. [address] [facility] Pract[employer]' Monitoring Program Monthly Witnessed Vivitrol form has been signed by Dr. Anabella Galvan and myself. Copy made for her chart, original given to pt.

## 2019-07-24 LAB
ALBUMIN SERPL-MCNC: 4.1 G/DL (ref 3.5–5.5)
ALBUMIN/GLOB SERPL: 1.5 {RATIO} (ref 1.2–2.2)
ALP SERPL-CCNC: 84 IU/L (ref 39–117)
ALT SERPL-CCNC: 10 IU/L (ref 0–32)
AST SERPL-CCNC: 9 IU/L (ref 0–40)
BILIRUB SERPL-MCNC: 0.2 MG/DL (ref 0–1.2)
BUN SERPL-MCNC: 7 MG/DL (ref 6–20)
BUN/CREAT SERPL: 10 (ref 9–23)
CALCIUM SERPL-MCNC: 9.6 MG/DL (ref 8.7–10.2)
CHLORIDE SERPL-SCNC: 104 MMOL/L (ref 96–106)
CO2 SERPL-SCNC: 18 MMOL/L (ref 20–29)
CREAT SERPL-MCNC: 0.73 MG/DL (ref 0.57–1)
GLOBULIN SER CALC-MCNC: 2.7 G/DL (ref 1.5–4.5)
GLUCOSE SERPL-MCNC: 117 MG/DL (ref 65–99)
POTASSIUM SERPL-SCNC: 4.5 MMOL/L (ref 3.5–5.2)
PROT SERPL-MCNC: 6.8 G/DL (ref 6–8.5)
SODIUM SERPL-SCNC: 139 MMOL/L (ref 134–144)

## 2019-08-08 DIAGNOSIS — F19.10 SUBSTANCE ABUSE (HCC): ICD-10-CM

## 2019-08-14 ENCOUNTER — TELEPHONE (OUTPATIENT)
Dept: INTERNAL MEDICINE CLINIC | Age: 24
End: 2019-08-14

## 2019-08-23 ENCOUNTER — OFFICE VISIT (OUTPATIENT)
Dept: INTERNAL MEDICINE CLINIC | Age: 24
End: 2019-08-23

## 2019-08-23 VITALS
RESPIRATION RATE: 12 BRPM | HEART RATE: 90 BPM | WEIGHT: 250 LBS | SYSTOLIC BLOOD PRESSURE: 124 MMHG | HEIGHT: 64 IN | BODY MASS INDEX: 42.68 KG/M2 | TEMPERATURE: 98.3 F | OXYGEN SATURATION: 96 % | DIASTOLIC BLOOD PRESSURE: 87 MMHG

## 2019-08-23 DIAGNOSIS — R73.09 INCREASED GLUCOSE LEVEL: ICD-10-CM

## 2019-08-23 DIAGNOSIS — F41.9 ANXIETY: ICD-10-CM

## 2019-08-23 DIAGNOSIS — F19.91 HISTORY OF DRUG USE: Primary | ICD-10-CM

## 2019-08-23 LAB — HBA1C MFR BLD HPLC: 4.9 %

## 2019-08-23 NOTE — PROGRESS NOTES
Administered Vivitrol 380mg/vial to patients left gluteus. Pt tolerated well. [address] Practition[employer] Monitoring Program Monthly Witnessed Vivitrol paperwork signed by Dr. Henry Hernández and myself, copy made to be scanned into pts chart, original given back to pt. [facility] [id number] LOT [id number] Expiration date 11/30/2021.

## 2019-08-23 NOTE — PROGRESS NOTES
Chief Complaint   Patient presents with    Injection     monthly Vivitrol injection     Patient is here to follow-up with regards to her anxiety and for her Vivitrol injection    Since last visit patient has gotten a job with [employer] 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 She is excited about this new position and will start on September 3.    bmi   Eating better and wallking  She is eating less carbs      History of drug use  Patient has been compliant with the [facility] program.  She will get her Vivitrol today. Anxiety  Patient reports she is doing very well. She is still seeing her psychiatrist.  Her BuSpar was recently increased to 15 mg twice a day as she is not on any more Atarax or Vistaril. She is excited about her work opportunity      Hyperlipidemia  Patient reports that she is going to try to adjust her diet for heart healthy diet. Past Medical History:   Diagnosis Date    Anxiety 2010    Bee sting allergy     severe. [facility]. Selina Agurire at [facility]: kidney disease 11/24/2012    Lyme disease     Musculoskeletal disorder 2009    Dislocated Rt shoulder    Recurrent UTI     hx pyelo. saw urology.   took macrodantin age [age] year     Past Surgical History:   Procedure Laterality Date    HX ROTATOR CUFF REPAIR Right 05/07/2019    HX TONSILLECTOMY  2002    SHOULDER SURG PROC UNLISTED Right 12/2010    torn labrum and tendon repair/right     Social History     Socioeconomic History    Marital status: SINGLE     Spouse name: Not on file    Number of children: Not on file    Years of education: Not on file    Highest education level: Not on file   Occupational History    Occupation: [employer]   Tobacco Use    Smoking status: Never Smoker    Smokeless tobacco: Never Used   Substance and Sexual Activity    Alcohol use: No     Comment: no ETOH since 12/1/2017    Drug use: Yes     Types: Opiates     Comment: sober since 12/1/2017    Sexual activity: Yes     Birth control/protection: Pill   Social History Narrative    Working full time at [employer], 40 hours/week + overtime    Not doing night shifts        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  busPIRone (BUSPAR) 10 mg tablet TAKE 1 TABLET BY MOUTH 3 TIMES A DAY WITH MEALS (Patient taking differently: Take 15 mg by mouth three (3) times daily.) 90 Tab 0    FLUoxetine (PROZAC) 20 mg tablet Take 4 Tabs by mouth daily. 180 Tab 0    norethindrone ac-eth estradiol (BETO 1.5/30, 21,) 1.5-30 mg-mcg tab Take 1 Tab by mouth nightly.  dicyclomine (BENTYL) 10 mg capsule Take 10 mg by mouth four (4) times daily.        Allergies   Allergen Reactions    Bee Sting Kit Anaphylaxis     And wasps    Cefepime Hives and Swelling    Cephalosporins Anaphylaxis    Ciprofloxacin Anaphylaxis    Quinolones Anaphylaxis    Rocephin [Ceftriaxone] Hives    Zithromax [Azithromycin] Hives       Review of Systems - General ROS: negative for - chills, fatigue, fever, hot flashes or malaise  Cardiovascular ROS: no chest pain or dyspnea on exertion  Respiratory ROS: no cough, shortness of breath, or wheezing    Visit Vitals  /87 (BP 1 Location: Left arm, BP Patient Position: Sitting)   Pulse 90   Temp 98.3 °F (36.8 °C) (Oral)   Resp 12   Ht 5' 4\" (1.626 m)   Wt 250 lb (113.4 kg)   SpO2 96%   BMI 42.91 kg/m²     General Appearance:  Well developed, well nourished,alert and oriented x 3, and individual in no acute distress. Ears/Nose/Mouth/Throat:   Hearing grossly normal.         Neck: Supple, no lad, no bruits   Chest:   Lungs clear to auscultation bilaterally. Cardiovascular:  Regular rate and rhythm, S1, S2 normal, no murmur. Abdomen:   Soft, non-tender, bowel sounds are active. Extremities: No edema bilaterally. Skin: Warm and dry, no suspicious lesions             Diagnoses and all orders for this visit:    1. History of drug use  Continue Vivitrol  No side effects of meds    2. Increased glucose level  -     AMB POC HEMOGLOBIN A1C  Reviewed with patient    3.  Anxiety  Continue meds  Patient appears to be doing very well continue BuSpar    BMI  Continue carb higher protein

## 2019-09-04 DIAGNOSIS — F19.10 SUBSTANCE ABUSE (HCC): ICD-10-CM

## 2019-09-10 ENCOUNTER — TELEPHONE (OUTPATIENT)
Dept: INTERNAL MEDICINE CLINIC | Age: 24
End: 2019-09-10

## 2019-09-10 NOTE — TELEPHONE ENCOUNTER
Radha Nesbitt called to check status of Vivitrol refill. Will refax refill request. Please call [phone] to confirm faxed was received. Thanks.

## 2019-09-11 ENCOUNTER — TELEPHONE (OUTPATIENT)
Dept: INTERNAL MEDICINE CLINIC | Age: 24
End: 2019-09-11

## 2019-09-11 NOTE — TELEPHONE ENCOUNTER
[facility] Office Pool         General Message/Vendor Calls     Caller's first and last name:   A rep from [facility]     Reason for call:   Calling to schedule a deliver date to have the medication \"vivitrol' to be delivered to the office.      Callback required yes/no and why:   YES     Best contact number(s):   [phone]     Details to clarify the request:       Francis Payne

## 2019-09-16 ENCOUNTER — TELEPHONE (OUTPATIENT)
Dept: INTERNAL MEDICINE CLINIC | Age: 24
End: 2019-09-16

## 2019-09-16 NOTE — TELEPHONE ENCOUNTER
A copy of the witnessed Vivitrol forms have been printed.  Attempted to reach pt, no answer, left voicemail requesting return call

## 2019-09-16 NOTE — TELEPHONE ENCOUNTER
Pt would like a copy of each time she received the Vivitrol injection to be picked up today. PSR unable to find a list of the injections in [address] [facility].  Thanks

## 2019-09-25 ENCOUNTER — OFFICE VISIT (OUTPATIENT)
Dept: INTERNAL MEDICINE CLINIC | Age: 24
End: 2019-09-25

## 2019-09-25 VITALS
OXYGEN SATURATION: 95 % | HEIGHT: 64 IN | RESPIRATION RATE: 18 BRPM | HEART RATE: 76 BPM | TEMPERATURE: 98.7 F | BODY MASS INDEX: 42.08 KG/M2 | SYSTOLIC BLOOD PRESSURE: 101 MMHG | WEIGHT: 246.5 LBS | DIASTOLIC BLOOD PRESSURE: 75 MMHG

## 2019-09-25 DIAGNOSIS — Z79.899 MEDICATION MANAGEMENT: Primary | ICD-10-CM

## 2019-09-25 RX ORDER — BUSPIRONE HYDROCHLORIDE 10 MG/1
15 TABLET ORAL 3 TIMES DAILY
Qty: 90 TAB | Refills: 1
Start: 2019-09-25 | End: 2020-05-20 | Stop reason: SDUPTHER

## 2019-09-25 RX ORDER — LEVONORGESTREL AND ETHINYL ESTRADIOL 0.1-0.02MG
1 KIT ORAL DAILY
COMMUNITY
End: 2021-02-19 | Stop reason: ALTCHOICE

## 2019-09-25 NOTE — PROGRESS NOTES
Vivitrol 380mg administered to right gluteus. Pt tolerated well.  Cipriano Gutierrez [id number] LOT [id number] EXP 7/31/2021

## 2019-09-25 NOTE — PROGRESS NOTES
Chief Complaint   Patient presents with    Injection     Patient is here to follow-up with regards to her anxiety and for her Vivitrol injection    Since last visit patient has gotten a job with [employer]. She has been at the facility for 3 weeks. 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 She reports she is working 312-hour shifts and her clients are mostly [id number]. She is enjoying her work. She is keeping herself busy. She is not sedentary. bmi   Eating better and wallking  She is eating less carbs  She is not on a regular exercise regimen. History of drug use  Patient has been compliant with the [facility] program.  She will get her Vivitrol today. She needs repeat verification forms for her prior liver trials. This was given to her. Anxiety  Stable      Hyperlipidemia  Patient reports that she is going to try to adjust her diet for heart healthy diet. Past Medical History:   Diagnosis Date    Anxiety 2010    Bee sting allergy     severe. Dr[facility] Vincent Goff at [facility]: kidney disease 11/24/2012    Lyme disease     Musculoskeletal disorder 2009    Dislocated Rt shoulder    Recurrent UTI     hx pyelo. saw urology.   took macrodantin age [age] year     Past Surgical History:   Procedure Laterality Date    HX ROTATOR CUFF REPAIR Right 05/07/2019    HX TONSILLECTOMY  2002    SHOULDER SURG PROC UNLISTED Right 12/2010    torn labrum and tendon repair/right     Social History     Socioeconomic History    Marital status: SINGLE     Spouse name: Not on file    Number of children: Not on file    Years of education: Not on file    Highest education level: Not on file   Occupational History    Occupation: [employer]   Tobacco Use    Smoking status: Never Smoker    Smokeless tobacco: Never Used   Substance and Sexual Activity    Alcohol use: No     Comment: no ETOH since 12/1/2017    Drug use: Yes     Types: Opiates Comment: sober since 12/1/2017    Sexual activity: Yes     Birth control/protection: Pill   Social History Narrative    Working full time at [employer],[address], 40 hours/week + overtime    Not doing night shifts        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  naltrexone microspheres (VIVITROL) 380 mg ER injection INJECT 380MG INTRAMUSCULARLY EVERY 4 WEEKS OR EVERY 1 MONTH 4 Each 3    acetaminophen (TYLENOL) 500 mg tablet Take 1,000 mg by mouth every six (6) hours as needed for Pain.  busPIRone (BUSPAR) 10 mg tablet TAKE 1 TABLET BY MOUTH 3 TIMES A DAY WITH MEALS (Patient taking differently: Take 15 mg by mouth three (3) times daily.) 90 Tab 0    FLUoxetine (PROZAC) 20 mg tablet Take 4 Tabs by mouth daily. 180 Tab 0    dicyclomine (BENTYL) 10 mg capsule Take 10 mg by mouth four (4) times daily.  norethindrone ac-eth estradiol (BETO 1.5/30, 21,) 1.5-30 mg-mcg tab Take 1 Tab by mouth nightly.        Allergies   Allergen Reactions    Bee Sting Kit Anaphylaxis     And wasps    Cefepime Hives and Swelling    Cephalosporins Anaphylaxis    Ciprofloxacin Anaphylaxis    Quinolones Anaphylaxis    Rocephin [Ceftriaxone] Hives    Zithromax [Azithromycin] Hives       Review of Systems - General ROS: negative for - chills, fatigue, fever, hot flashes or malaise  Cardiovascular ROS: no chest pain or dyspnea on exertion  Respiratory ROS: no cough, shortness of breath, or wheezing    Visit Vitals  /75 (BP 1 Location: Left arm, BP Patient Position: Sitting)   Pulse 76   Temp 98.7 °F (37.1 °C) (Oral)   Resp 18   Ht 5' 4\" (1.626 m)   Wt 246 lb 8 oz (111.8 kg) SpO2 95%   BMI 42.31 kg/m²     General Appearance:  Well developed, well nourished,alert and oriented x 3, and individual in no acute distress. Ears/Nose/Mouth/Throat:   Hearing grossly normal.         Neck: Supple, no lad, no bruits   Chest:   Lungs clear to auscultation bilaterally. Cardiovascular:  Regular rate and rhythm, S1, S2 normal, no murmur. Abdomen:   Soft, non-tender, bowel sounds are active. Extremities: No edema bilaterally. Skin: Warm and dry, no suspicious lesions             Diagnoses and all orders for this visit:    1. Medication management  Patient received her Vivitrol injection today. Donato Terrazas gave to her. Additional forms filled for her. To follow-up in 1 month or earlier if needed    Other orders  -     busPIRone (BUSPAR) 10 mg tablet; Take 1.5 Tabs by mouth three (3) times daily.

## 2019-10-24 ENCOUNTER — TELEPHONE (OUTPATIENT)
Dept: INTERNAL MEDICINE CLINIC | Age: 24
End: 2019-10-24

## 2019-10-24 NOTE — TELEPHONE ENCOUNTER
----- Message from Shikha Hayward sent at 10/24/2019  9:34 AM EDT -----  Regarding: DR Jasmeet Luong / Gabriela Ott Message/Vendor April Ptetit is requesting to speak with nurse to verify address and date for the delivery of \"VIVITROL\" 300 MG     Callback required     Best contact number(s):  [phone]        Shikha Hayward

## 2019-10-28 ENCOUNTER — TELEPHONE (OUTPATIENT)
Dept: INTERNAL MEDICINE CLINIC | Age: 24
End: 2019-10-28

## 2019-10-28 NOTE — TELEPHONE ENCOUNTER
Pt scheduled for tomorrow, 10/29/19, for Vivitrol injection. Wants to make sure it is available. Thanks.

## 2019-10-28 NOTE — TELEPHONE ENCOUNTER
Returned call to pt. She has been advised that her injection has been received and appt for tomorrow has been verified. Pt thanks.

## 2019-10-29 ENCOUNTER — OFFICE VISIT (OUTPATIENT)
Dept: INTERNAL MEDICINE CLINIC | Age: 24
End: 2019-10-29

## 2019-10-29 VITALS
SYSTOLIC BLOOD PRESSURE: 115 MMHG | TEMPERATURE: 98.2 F | BODY MASS INDEX: 41.52 KG/M2 | DIASTOLIC BLOOD PRESSURE: 89 MMHG | RESPIRATION RATE: 12 BRPM | WEIGHT: 243.2 LBS | HEIGHT: 64 IN | HEART RATE: 87 BPM | OXYGEN SATURATION: 97 %

## 2019-10-29 DIAGNOSIS — F19.91 HISTORY OF DRUG USE: ICD-10-CM

## 2019-10-29 NOTE — PROGRESS NOTES
Chief Complaint   Patient presents with    Injection     Vivitrol injection     Patient is here to follow-up with regards to her anxiety and for her Vivitrol injection    Since last visit patient has gotten a job with [employer] She has been at the facility for 8 weeks. She reports she is enjoying her work. They will have a Halloween party. She really enjoys this holiday although she will be working. bmi   Eating better and wallking  She is eating less carbs  She is not on a regular exercise regimen. We discussed her cholesterol and she is continue to eat a heart healthy diet as tolerated      History of drug use  Patient has been compliant with the HPNP program.  She will get her Vivitrol today. She needs repeat verification forms for her prior liver trials. This was given to her. Anxiety  Stable      Hyperlipidemia  Patient reports that she is going to try to adjust her diet for heart healthy diet. Past Medical History:   Diagnosis Date    Anxiety 2010    Bee sting allergy     severe. Dr. Jose Almaguer at [facility]: kidney disease 11/24/2012    Lyme disease     Musculoskeletal disorder 2009    Dislocated Rt shoulder    Recurrent UTI     hx pyelo. saw urology.   took macrodantin age [age] year     Past Surgical History:   Procedure Laterality Date    HX ROTATOR CUFF REPAIR Right 05/07/2019    HX TONSILLECTOMY  2002    SHOULDER SURG PROC UNLISTED Right 12/2010    torn labrum and tendon repair/right     Social History     Socioeconomic History    Marital status: SINGLE     Spouse name: Not on file    Number of children: Not on file    Years of education: Not on file    Highest education level: Not on file   Occupational History    Occupation: [employer]   Tobacco Use    Smoking status: Never Smoker    Smokeless tobacco: Never Used   Substance and Sexual Activity    Alcohol use: No     Comment: no ETOH since 12/1/2017    Drug use: Yes     Types: Opiates     Comment: sober since 12/1/2017    Sexual activity: Yes     Birth control/protection: Pill   Social History Narrative    Working full time at [address], 40 hours/week + overtime    Not doing night shifts        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  busPIRone (BUSPAR) 10 mg tablet Take 1.5 Tabs by mouth three (3) times daily. 90 Tab 1    naltrexone microspheres (VIVITROL) 380 mg ER injection INJECT 380MG INTRAMUSCULARLY EVERY 4 WEEKS OR EVERY 1 MONTH 4 Each 3    FLUoxetine (PROZAC) 20 mg tablet Take 4 Tabs by mouth daily. 180 Tab 0    dicyclomine (BENTYL) 10 mg capsule Take 10 mg by mouth four (4) times daily. Allergies   Allergen Reactions    Bee Sting Kit Anaphylaxis     And wasps    Cefepime Hives and Swelling    Cephalosporins Anaphylaxis    Ciprofloxacin Anaphylaxis    Quinolones Anaphylaxis    Rocephin [Ceftriaxone] Hives    Zithromax [Azithromycin] Hives       Review of Systems - General ROS: negative for - chills, fatigue, fever, hot flashes or malaise  Cardiovascular ROS: no chest pain or dyspnea on exertion  Respiratory ROS: no cough, shortness of breath, or wheezing    Visit Vitals  /89 (BP 1 Location: Left arm, BP Patient Position: Sitting)   Pulse 87   Temp 98.2 °F (36.8 °C) (Oral)   Resp 12   Ht 5' 4\" (1.626 m)   Wt 243 lb 3.2 oz (110.3 kg)   SpO2 97%   BMI 41.75 kg/m²     General Appearance:  Well developed, well nourished,alert and oriented x 3, and individual in no acute distress. Ears/Nose/Mouth/Throat:   Hearing grossly normal.         Neck: Supple, no lad, no bruits   Chest:   Lungs clear to auscultation bilaterally. Cardiovascular:  Regular rate and rhythm, S1, S2 normal, no murmur. Abdomen:   Soft, non-tender, bowel sounds are active. Extremities: No edema bilaterally. Skin: Warm and dry, no suspicious lesions             Diagnoses and all orders for this visit:    1. BMI 40.0-44.9, adult [facility])  Discussed with patient  She is exercising and eating heart healthy diet. She is getting enough protein    2. History of drug use  Injection given by Chapis Washburn  No history of side effects  Labs appear to be within normal limits    She will follow-up in 1 month for an annual if she has not done within the year.

## 2019-12-05 ENCOUNTER — OFFICE VISIT (OUTPATIENT)
Dept: INTERNAL MEDICINE CLINIC | Age: 24
End: 2019-12-05

## 2019-12-05 VITALS
HEIGHT: 64 IN | BODY MASS INDEX: 41.06 KG/M2 | SYSTOLIC BLOOD PRESSURE: 109 MMHG | TEMPERATURE: 98.8 F | WEIGHT: 240.5 LBS | HEART RATE: 94 BPM | OXYGEN SATURATION: 96 % | RESPIRATION RATE: 18 BRPM | DIASTOLIC BLOOD PRESSURE: 78 MMHG

## 2019-12-05 DIAGNOSIS — F41.9 ANXIETY: ICD-10-CM

## 2019-12-05 DIAGNOSIS — F19.91 HISTORY OF DRUG USE: ICD-10-CM

## 2019-12-05 DIAGNOSIS — N28.9 RENAL INSUFFICIENCY: Primary | ICD-10-CM

## 2019-12-05 DIAGNOSIS — N28.9 RENAL INSUFFICIENCY: ICD-10-CM

## 2019-12-05 RX ORDER — PROPRANOLOL HYDROCHLORIDE 20 MG/1
TABLET ORAL AS NEEDED
COMMUNITY

## 2019-12-05 NOTE — PROGRESS NOTES
Chief Complaint   Patient presents with    Injection     Vivitrol     Patient is here to follow-up with regards to her anxiety and for her Vivitrol injection    Since last visit patient has gotten a job with [employer] She has been at the facility for 8 weeks. She reports she is enjoying her work. 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 Anxiety  Patient reports her anxiety was increasing and was not allowed to take hydroxyzine so her BuSpar was increased to 20 mg 3 times daily. Her psychiatrist also added propanolol. She will be visiting her brother in [place] for the holidays. Overall her anxiety is controlled. bmi   Eating better and wallking  She is eating less carbs  She is not on a regular exercise regimen. We discussed [address] [place] [address] weight and wellness and she is interested in starting sessions if possible. History of drug use  Patient has been compliant with the HPNP program.  She will get her Vivitrol today. She needs repeat verification forms for her prior liver trials. This was given to her. She reports her blood work had some renal insufficiency. She reports she does not drink enough water fluids. She would like a recheck      Hyperlipidemia  Patient reports that she is going to try to adjust her diet for heart healthy diet. Past Medical History:   Diagnosis Date    Anxiety 2010    Bee sting allergy     severe. [facility] Nanette Soliman at [facility]: kidney disease 11/24/2012    Lyme disease     Musculoskeletal disorder 2009    Dislocated Rt shoulder    Recurrent UTI     hx pyelo. saw urology.   took macrodantin age [age] year     Past Surgical History:   Procedure Laterality Date    HX ROTATOR CUFF REPAIR Right 05/07/2019    HX TONSILLECTOMY  2002    SHOULDER SURG PROC UNLISTED Right 12/2010    torn labrum and tendon repair/right     Social History     Socioeconomic History    Marital status: SINGLE     Spouse name: Not on file    Number of children: Not on file    Years of education: Not on file    Highest education level: Not on file   Occupational History    Occupation: [employer]   Tobacco Use    Smoking status: Never Smoker    Smokeless tobacco: Never Used   Substance and Sexual Activity    Alcohol use: No     Comment: no ETOH since 12/1/2017    Drug use: Yes     Types: Opiates     Comment: sober since 12/1/2017    Sexual activity: Yes     Birth control/protection: Pill   Social History Narrative    Working full time at [employer], 40 hours/week + overtime    Not doing night shifts        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 As needed      levonorgestrel-ethinyl estradiol (VIENVA) 0.1-20 mg-mcg tab Take 1 Tab by mouth daily.  busPIRone (BUSPAR) 10 mg tablet Take 1.5 Tabs by mouth three (3) times daily. (Patient taking differently: Take 20 mg by mouth three (3) times daily.) 90 Tab 1    naltrexone microspheres (VIVITROL) 380 mg ER injection INJECT 380MG INTRAMUSCULARLY EVERY 4 WEEKS OR EVERY 1 MONTH 4 Each 3    FLUoxetine (PROZAC) 20 mg tablet Take 4 Tabs by mouth daily. 180 Tab 0    dicyclomine (BENTYL) 10 mg capsule Take 10 mg by mouth as needed.        Allergies   Allergen Reactions    Bee Sting Kit Anaphylaxis     And wasps    Cefepime Hives and Swelling    Cephalosporins Anaphylaxis    Ciprofloxacin Anaphylaxis    Quinolones Anaphylaxis    Rocephin [Ceftriaxone] Hives    Zithromax [Azithromycin] Hives       Review of Systems - General ROS: negative for - chills, fatigue, fever, hot flashes or malaise  Cardiovascular ROS: no chest pain or dyspnea on exertion  Respiratory ROS: no cough, shortness of breath, or wheezing    Visit Vitals  /78 (BP 1 Location: Left arm, BP Patient Position: Sitting)   Pulse 94   Temp 98.8 °F (37.1 °C) (Oral)   Resp 18   Ht 5' 4\" (1.626 m)   Wt 240 lb 8 oz (109.1 kg)   LMP 11/18/2019   SpO2 96%   BMI 41.28 kg/m²     General Appearance:  Well developed, well nourished,alert and oriented x 3, and individual in no acute distress. Ears/Nose/Mouth/Throat:   Hearing grossly normal.         Neck: Supple, no lad, no bruits   Chest:   Lungs clear to auscultation bilaterally. Cardiovascular:  Regular rate and rhythm, S1, S2 normal, no murmur. Abdomen:   Soft, non-tender, bowel sounds are active. Extremities: No edema bilaterally. Skin: Warm and dry, no suspicious lesions             Diagnoses and all orders for this visit:    1. Renal insufficiency  We will recheck labs and look at UA for specific gravity. Patient may need treatment with fluids  -     CBC W/O DIFF; Future  -     METABOLIC PANEL, COMPREHENSIVE; Future  -     URINALYSIS W/ RFLX MICROSCOPIC; Future    2. History of drug use  Vivitrol injection given today by Yohana Pate    3. BMI 41  Encourage patient to see Solo Isidro cat at [employer] [facility]  We discussed heart healthy diet.   She is aware    Anxiety  Continue BuSpar            Pt needs to have vitvitorl before dec 31

## 2019-12-05 NOTE — PROGRESS NOTES
Vivitrol injection given 12/05/19  Right Gluteal IM   Lot # [id number]  Exp: 03/2022  NDC: 84982-119-71  Pt tolerated injection and form was copied for pts chart.

## 2019-12-23 ENCOUNTER — TELEPHONE (OUTPATIENT)
Dept: INTERNAL MEDICINE CLINIC | Age: 24
End: 2019-12-23

## 2019-12-23 NOTE — TELEPHONE ENCOUNTER
Pt call in to let nurse that the insurance is faxing over authorization for for medication vivitrol. Please send back today so will have it for her appointment.   moe pt # [phone]

## 2020-01-02 ENCOUNTER — TELEPHONE (OUTPATIENT)
Dept: INTERNAL MEDICINE CLINIC | Age: 25
End: 2020-01-02

## 2020-01-02 NOTE — TELEPHONE ENCOUNTER
----- Message from Tucker Ayoub sent at 12/31/2019  4:09 PM EST -----  Regarding: Dr. Corinna Hinds first and last name: Serg Brooks  Reason for call:  injection appt  Callback required yes/no and why: yes  Best contact number(s): [phone]  Details to clarify the request: needed to do a monthly injection but there was no appts available at the top of the month. Pt could do it as a nurse appt as well.

## 2020-01-07 ENCOUNTER — TELEPHONE (OUTPATIENT)
Dept: INTERNAL MEDICINE CLINIC | Age: 25
End: 2020-01-07

## 2020-01-07 NOTE — TELEPHONE ENCOUNTER
Called [facility], Called in Vivitrol 1 vial with 3 refills per PCP verbal order.  Pharmacist unable to give me an estimated time of when it will be delivered

## 2020-01-07 NOTE — TELEPHONE ENCOUNTER
----- Message from Becki Cartagena sent at 1/7/2020 12:06 PM EST -----  Regarding: Dr. Yariel Soares (if not patient):  Relationship of caller (if not patient):  Best contact number(s): [phone]  Name of medication and dosage if known: \"diditrol\" 380 mg, induction  Is patient out of this medication (yes/no): yes  Pharmacy name: [address] listed in chart? (yes/no): yes  Pharmacy phone number: [phone]  Date of last visit: 12/5/19  Details to clarify the request: Pt has new insurance, Gary Weiss. Pt talked to pharmacy and pharmacy can be able to have it ready in two days.

## 2020-01-07 NOTE — TELEPHONE ENCOUNTER
Spoke to pt, advised her that I called in the medication to [facility] and that I was advised that the pharm has to process the med, forward it to insurance claims for coverage, will contact the patient, then will contact the office to schedule delivery. I advised her that I can work her in to have her injection whenever we have it arrive.  Pt thanks for the information and thanks

## 2020-01-07 NOTE — TELEPHONE ENCOUNTER
----- Message from Evangelista Jean sent at 1/7/2020 12:10 PM EST -----  Regarding: Dr. Guilherme Boykin first and last name: Kavon Saulpamela  Reason for call:  lab appt for injection   Callback required yes/no and why: yes  Best contact number(s): [phone]  Details to clarify the request: wanted the injection  appt this Friday. Pt is awaiting her dititrol medication to be sent to her pharmacy so she can get it done anytime this Friday.

## 2020-01-09 ENCOUNTER — TELEPHONE (OUTPATIENT)
Dept: INTERNAL MEDICINE CLINIC | Age: 25
End: 2020-01-09

## 2020-01-09 NOTE — TELEPHONE ENCOUNTER
Patient called to advise nurse medication requires prior auth with new insurance.  Patient is requesting nurse please call [phone] to request urgent approval.

## 2020-01-10 NOTE — TELEPHONE ENCOUNTER
Called to do prior authorization. Spoke with [facility]. Vivitrol injection has been approved for 24 months. Authorization number [id number].  Called pt and left message on personal voicemail to advise

## 2020-01-13 ENCOUNTER — TELEPHONE (OUTPATIENT)
Dept: INTERNAL MEDICINE CLINIC | Age: 25
End: 2020-01-13

## 2020-01-13 NOTE — TELEPHONE ENCOUNTER
Pt is requesting to be fit in this Wednesday at any time or Thursday after 11am for a Vivitrol injection. States Dr. Estefanía Pryor will fit her in. Thanks.

## 2020-01-13 NOTE — TELEPHONE ENCOUNTER
----- Message from Zackary Schafer sent at 1/13/2020  4:40 PM EST -----  Regarding: Dr. Roberto Gonzalez first and last name: Franklin Hernandez  Reason for call:  returning Dr. Christian Gnozales required yes/no and why: yes  Best contact number(s): [phone] ext [phone] (before 5 pm);  [phone] (after 5 pm) leave a message for the reason of the call   Details to clarify the request: Pt think the call is about a previously cancelled appt on 1/7/2020 and that the doctor may want to reschedule that cancelled appt.

## 2020-01-14 NOTE — TELEPHONE ENCOUNTER
Returned call to pt, unable to reach pt at ext, called cell, no answer, left voicemail requesting return call

## 2020-01-16 ENCOUNTER — OFFICE VISIT (OUTPATIENT)
Dept: INTERNAL MEDICINE CLINIC | Age: 25
End: 2020-01-16

## 2020-01-16 VITALS
HEIGHT: 64 IN | OXYGEN SATURATION: 96 % | DIASTOLIC BLOOD PRESSURE: 62 MMHG | SYSTOLIC BLOOD PRESSURE: 100 MMHG | BODY MASS INDEX: 40.63 KG/M2 | TEMPERATURE: 98.3 F | HEART RATE: 91 BPM | WEIGHT: 238 LBS | RESPIRATION RATE: 12 BRPM

## 2020-01-16 DIAGNOSIS — F19.11 PERSONAL HISTORY OF DRUG ABUSE (HCC): ICD-10-CM

## 2020-01-16 DIAGNOSIS — F32.A DEPRESSION, UNSPECIFIED DEPRESSION TYPE: ICD-10-CM

## 2020-01-16 NOTE — PROGRESS NOTES
Chief Complaint   Patient presents with    Injection     Vivitrol injection     Patient is here to follow-up with regards to her anxiety and for her Vivitrol injection    Depression  Patient has been at [employer] and seems to be enjoying her work. She has her controlled substance privileges returned. Anxiety  Stable      bmi   Eating better and wallking. Patient has been doing my fitness pal and has lost some weight. She is eating less carbs  She is not on a regular exercise regimen. She will reach out to [facility] cat if her weight plateaus. .      History of drug use  Patient has been compliant with the HPNP program.  She will get her Vivitrol today. She needs repeat verification forms for her prior liver trials. She did not do blood work for her renal insufficiency check      Past Medical History:   Diagnosis Date    Anxiety 2010    Bee sting allergy     severe. Dr. Nanci [facility]autam [facility]: kidney disease 11/24/2012    Lyme disease     Musculoskeletal disorder 2009    Dislocated Rt shoulder    Recurrent UTI     hx pyelo. saw urology.   took macrodantin age [age] year     Past Surgical History:   Procedure Laterality Date    HX ROTATOR CUFF REPAIR Right 05/07/2019    HX TONSILLECTOMY  2002    SHOULDER SURG PROC UNLISTED Right 12/2010    torn labrum and tendon repair/right     Social History     Socioeconomic History    Marital status: SINGLE     Spouse name: Not on file    Number of children: Not on file    Years of education: Not on file    Highest education level: Not on file   Occupational History    Occupation: [employer]   Tobacco Use    Smoking status: Never Smoker    Smokeless tobacco: Never Used   Substance and Sexual Activity    Alcohol use: No     Comment: no ETOH since 12/1/2017    Drug use: Yes     Types: Opiates     Comment: sober since 12/1/2017    Sexual activity: Yes     Birth control/protection: Pill   Social History Narrative    Working full time at 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 As needed      levonorgestrel-ethinyl estradiol (VIENVA) 0.1-20 mg-mcg tab Take 1 Tab by mouth daily.  busPIRone (BUSPAR) 10 mg tablet Take 1.5 Tabs by mouth three (3) times daily. (Patient taking differently: Take 20 mg by mouth three (3) times daily.) 90 Tab 1    naltrexone microspheres (VIVITROL) 380 mg ER injection INJECT 380MG INTRAMUSCULARLY EVERY 4 WEEKS OR EVERY 1 MONTH 4 Each 3    FLUoxetine (PROZAC) 20 mg tablet Take 4 Tabs by mouth daily. 180 Tab 0    dicyclomine (BENTYL) 10 mg capsule Take 10 mg by mouth as needed. Allergies   Allergen Reactions    Bee Sting Kit Anaphylaxis     And wasps    Cefepime Hives and Swelling    Cephalosporins Anaphylaxis    Ciprofloxacin Anaphylaxis    Quinolones Anaphylaxis    Rocephin [Ceftriaxone] Hives    Zithromax [Azithromycin] Hives       Review of Systems - General ROS: negative for - chills, fatigue, fever, hot flashes or malaise  Cardiovascular ROS: no chest pain or dyspnea on exertion  Respiratory ROS: no cough, shortness of breath, or wheezing    Visit Vitals  /83 (BP 1 Location: Left arm, BP Patient Position: Sitting)   Pulse 91   Temp 98.3 °F (36.8 °C) (Oral)   Resp 12   Ht 5' 4\" (1.626 m)   Wt 238 lb (108 kg)   SpO2 96%   BMI 40.85 kg/m²     General Appearance:  Well developed, well nourished,alert and oriented x 3, and individual in no acute distress. Ears/Nose/Mouth/Throat:   Hearing grossly normal.         Neck: Supple, no lad, no bruits   Chest:   Lungs clear to auscultation bilaterally. Cardiovascular:  Regular rate and rhythm, S1, S2 normal, no murmur. Abdomen:   Soft, non-tender, bowel sounds are active. Extremities: No edema bilaterally. Skin: Warm and dry, no suspicious lesions                 Diagnoses and all orders for this visit:    1. BMI 40.0-44.9, adult ([facility]  Encouraged continued compliance with my fitness pal and diet  Encouraged her to see Bela meehan if her weight plateaus  She may also be a good candidate for Ozempic. This was discussed with her. 2. Personal history of drug abuse ([facility]   patient will have her Vivitrol injection today      3.  Depression, unspecified depression type  Patient appears to be enjoying her work  Continue meds    Follow-up in 1 month or earlier if needed

## 2020-01-16 NOTE — PROGRESS NOTES
Administered Vivitrol 380mg injection to left gluteus. Pt tolerated well. [address]: [id number]. LOT: [id number].  EXP: 04/30/2022

## 2020-02-13 ENCOUNTER — TELEPHONE (OUTPATIENT)
Dept: INTERNAL MEDICINE CLINIC | Age: 25
End: 2020-02-13

## 2020-02-13 NOTE — TELEPHONE ENCOUNTER
Pt missed 8am appt today and has been r/s'd for 2/21/20. Pt asked if she can be fit in before 2/19/20 as she needs her injection. Thanks.

## 2020-02-17 ENCOUNTER — OFFICE VISIT (OUTPATIENT)
Dept: INTERNAL MEDICINE CLINIC | Age: 25
End: 2020-02-17

## 2020-02-17 VITALS
RESPIRATION RATE: 12 BRPM | BODY MASS INDEX: 40.43 KG/M2 | OXYGEN SATURATION: 96 % | TEMPERATURE: 99 F | HEART RATE: 84 BPM | HEIGHT: 64 IN | DIASTOLIC BLOOD PRESSURE: 75 MMHG | SYSTOLIC BLOOD PRESSURE: 107 MMHG | WEIGHT: 236.8 LBS

## 2020-02-17 DIAGNOSIS — F19.91 HISTORY OF DRUG USE: ICD-10-CM

## 2020-02-17 NOTE — PROGRESS NOTES
Chief Complaint   Patient presents with    Injection     Vivitrol Injection     Patient is here to follow-up with regards to her anxiety and for her Vivitrol injection    Depression  Patient has been at [employer] and seems to be enjoying her work. She has her controlled substance privileges returned. Anxiety  Stable  She reports she has a lot of good friends at her work. She is taking BuSpar and Prozac. She is also seen by psychiatry. bmi   , Has lost weight from last visit  Eating better and wallking. Patient has been doing my fitness pal and has lost some weight. She is eating less carbs  She is not on a regular exercise regimen. She has been walking her dog      History of drug use  Patient has been compliant with the HPNP program.  She will get her Vivitrol today. She needs repeat verification forms for her prior liver trials. She did not do blood work for her renal insufficiency check      Past Medical History:   Diagnosis Date    Anxiety 2010    Bee sting allergy     severe. Dr. Jenaro Arredondo at [facility]: kidney disease 11/24/2012    Lyme disease     Musculoskeletal disorder 2009    Dislocated Rt shoulder    Recurrent UTI     hx pyelo. saw urology.   took macrodantin age [age] year     Past Surgical History:   Procedure Laterality Date    HX ROTATOR CUFF REPAIR Right 05/07/2019    HX TONSILLECTOMY  2002    SHOULDER SURG PROC UNLISTED Right 12/2010    torn labrum and tendon repair/right     Social History     Socioeconomic History    Marital status: SINGLE     Spouse name: Not on file    Number of children: Not on file    Years of education: Not on file    Highest education level: Not on file   Occupational History    Occupation: [employer]   Tobacco Use    Smoking status: Never Smoker    Smokeless tobacco: Never Used   Substance and Sexual Activity    Alcohol use: No     Comment: no ETOH since 12/1/2017    Drug use: Yes     Types: Opiates     Comment: sober since 12/1/2017    Sexual activity: Yes     Birth control/protection: Pill   Social History Narrative    Working full time at [employer] [employer] is [employer],[address], 40 hours/week + overtime    Not doing night shifts        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 As needed      levonorgestrel-ethinyl estradiol (VIENVA) 0.1-20 mg-mcg tab Take 1 Tab by mouth daily.  busPIRone (BUSPAR) 10 mg tablet Take 1.5 Tabs by mouth three (3) times daily. (Patient taking differently: Take 20 mg by mouth three (3) times daily.) 90 Tab 1    naltrexone microspheres (VIVITROL) 380 mg ER injection INJECT 380MG INTRAMUSCULARLY EVERY 4 WEEKS OR EVERY 1 MONTH 4 Each 3    FLUoxetine (PROZAC) 20 mg tablet Take 4 Tabs by mouth daily. 180 Tab 0     Allergies   Allergen Reactions    Bee Sting Kit Anaphylaxis     And wasps    Cefepime Hives and Swelling    Cephalosporins Anaphylaxis    Ciprofloxacin Anaphylaxis    Quinolones Anaphylaxis    Rocephin [Ceftriaxone] Hives    Zithromax [Azithromycin] Hives       Review of Systems - General ROS: negative for - chills, fatigue, fever, hot flashes or malaise  Cardiovascular ROS: no chest pain or dyspnea on exertion  Respiratory ROS: no cough, shortness of breath, or wheezing    Visit Vitals  /75 (BP 1 Location: Left arm, BP Patient Position: Sitting)   Pulse 84   Temp 99 °F (37.2 °C) (Oral)   Resp 12   Ht 5' 4\" (1.626 m)   Wt 236 lb 12.8 oz (107.4 kg)   SpO2 96%   BMI 40.65 kg/m²     General Appearance:  Well developed, well nourished,alert and oriented x 3, and individual in no acute distress.    Ears/Nose/Mouth/Throat:   Hearing grossly normal.         Neck: Supple, no lad, no bruits   Chest:   Lungs clear to auscultation bilaterally. Cardiovascular:  Regular rate and rhythm, S1, S2 normal, no murmur. Abdomen:   Soft, non-tender, bowel sounds are active. Extremities: No edema bilaterally. Skin: Warm and dry, no suspicious lesions                 Diagnoses and all orders for this visit:    1. BMI 40.0-44.9, adult [facility])  Encourage patient to continue heart healthy diet and exercise as tolerated    2.  History of drug use  Vivetrol given by [facility] today

## 2020-03-17 ENCOUNTER — OFFICE VISIT (OUTPATIENT)
Dept: INTERNAL MEDICINE CLINIC | Age: 25
End: 2020-03-17

## 2020-03-17 VITALS
TEMPERATURE: 98.4 F | WEIGHT: 238.6 LBS | BODY MASS INDEX: 40.74 KG/M2 | RESPIRATION RATE: 12 BRPM | HEIGHT: 64 IN | DIASTOLIC BLOOD PRESSURE: 84 MMHG | OXYGEN SATURATION: 96 % | HEART RATE: 81 BPM | SYSTOLIC BLOOD PRESSURE: 117 MMHG

## 2020-03-17 DIAGNOSIS — F19.91 HISTORY OF DRUG USE: Primary | ICD-10-CM

## 2020-03-17 NOTE — PROGRESS NOTES
Chief Complaint   Patient presents with    Injection     Vivitrol injection     Patient is here  for her Vivitrol injection    Depression  Patient has been at [facility] [facility] [employer] [facility] [employer] [facility] and seems to be enjoying her work. She has her controlled substance privileges returned. She reports that she has not had any new changes since her last visit. bmi   Stable      History of drug use  Patient has been compliant with the HPNP program.  She will get her Vivitrol today. She needs repeat verification forms for her prior liver trials. She did not do blood work for her renal insufficiency check      Past Medical History:   Diagnosis Date    Anxiety 2010    Bee sting allergy     severe. Dr. Silvano Boyd at [facility]: kidney disease 11/24/2012    Lyme disease     Musculoskeletal disorder 2009    Dislocated Rt shoulder    Recurrent UTI     hx pyelo. saw urology.   took macrodantin age [age] year     Past Surgical History:   Procedure Laterality Date    HX ROTATOR CUFF REPAIR Right 05/07/2019    HX TONSILLECTOMY  2002    SHOULDER SURG PROC UNLISTED Right 12/2010    torn labrum and tendon repair/right     Social History     Socioeconomic History    Marital status: SINGLE     Spouse name: Not on file    Number of children: Not on file    Years of education: Not on file    Highest education level: Not on file   Occupational History    Occupation: [employer]   Tobacco Use    Smoking status: Never Smoker    Smokeless tobacco: Never Used   Substance and Sexual Activity    Alcohol use: No     Comment: no ETOH since 12/1/2017    Drug use: Yes     Types: Opiates     Comment: sober since 12/1/2017    Sexual activity: Yes     Birth control/protection: Pill   Social History Narrative    Working full time at [address] [employer], 40 hours/week + overtime    Not doing night shifts        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 As needed      levonorgestrel-ethinyl estradiol (VIENVA) 0.1-20 mg-mcg tab Take 1 Tab by mouth daily.  busPIRone (BUSPAR) 10 mg tablet Take 1.5 Tabs by mouth three (3) times daily. (Patient taking differently: Take 20 mg by mouth three (3) times daily.) 90 Tab 1    naltrexone microspheres (VIVITROL) 380 mg ER injection INJECT 380MG INTRAMUSCULARLY EVERY 4 WEEKS OR EVERY 1 MONTH 4 Each 3    FLUoxetine (PROZAC) 20 mg tablet Take 4 Tabs by mouth daily. 180 Tab 0     Allergies   Allergen Reactions    Bee Sting Kit Anaphylaxis     And wasps    Cefepime Hives and Swelling    Cephalosporins Anaphylaxis    Ciprofloxacin Anaphylaxis    Quinolones Anaphylaxis    Rocephin [Ceftriaxone] Hives    Zithromax [Azithromycin] Hives       Review of Systems - General ROS: negative for - chills, fatigue, fever, hot flashes or malaise  Cardiovascular ROS: no chest pain or dyspnea on exertion  Respiratory ROS: no cough, shortness of breath, or wheezing    Visit Vitals  /84 (BP 1 Location: Left arm, BP Patient Position: Sitting)   Pulse 81   Temp 98.4 °F (36.9 °C) (Oral)   Resp 12   Ht 5' 4\" (1.626 m)   Wt 238 lb 9.6 oz (108.2 kg)   SpO2 96%   BMI 40.96 kg/m²     General Appearance:  Well developed, well nourished,alert and oriented x 3, and individual in no acute distress. Ears/Nose/Mouth/Throat:   Hearing grossly normal.         Neck: Supple, no lad, no bruits   Chest:   Lungs clear to auscultation bilaterally. Cardiovascular:  Regular rate and rhythm, S1, S2 normal, no murmur. Abdomen:   Soft, non-tender, bowel sounds are active. Extremities: No edema bilaterally. Skin: Warm and dry, no suspicious lesions                 Diagnoses and all orders for this visit:    1.  History of drug use  Very stable, continue meds

## 2020-03-17 NOTE — PROGRESS NOTES
Administered Vivitrol 380mg/vial to pts left gluteus. Pt tolerated well. Oumar King 47: [id number]. LOT: [id number].  Exp: 06/30/2022

## 2020-04-17 ENCOUNTER — OFFICE VISIT (OUTPATIENT)
Dept: INTERNAL MEDICINE CLINIC | Age: 25
End: 2020-04-17

## 2020-04-17 VITALS
HEIGHT: 64 IN | OXYGEN SATURATION: 96 % | WEIGHT: 239.5 LBS | SYSTOLIC BLOOD PRESSURE: 115 MMHG | DIASTOLIC BLOOD PRESSURE: 80 MMHG | RESPIRATION RATE: 18 BRPM | TEMPERATURE: 98.2 F | HEART RATE: 80 BPM | BODY MASS INDEX: 40.89 KG/M2

## 2020-04-17 DIAGNOSIS — F32.89 OTHER DEPRESSION: Primary | ICD-10-CM

## 2020-04-17 DIAGNOSIS — F19.91 HISTORY OF DRUG USE DISORDER: ICD-10-CM

## 2020-04-17 NOTE — PROGRESS NOTES
Vivitrol 380mg/vial Injection given IM in Right Gluteal   Lot # [id number]  Exp 07/31/2021  [facility] [id number]

## 2020-04-17 NOTE — PROGRESS NOTES
Chief Complaint   Patient presents with    Injection     Patient is here  for her Vivitrol injection. Her last injection was March 17, 2020. Since last visit she is still at the [facility]. She has been committed to work therefore 2 years. She received a signing bonus so she will be there for 2 years. She is acclimating to the facility and the children there. She has been there for about 8 months. Depression  Patient is on Prozac 80 mg. She does not think she needs a change in dose. She feels like she is quite stable. .    bmi   Stable  She has not reached out to [address] however she did lose weight on her own. She is stabilized. History of drug use  Patient has been compliant with the HPNP program.  She will get her Vivitrol today. She needs repeat verification forms for her prior liver trials. Past Medical History:   Diagnosis Date    Anxiety 2010    Bee sting allergy     severe. Dr. Moira Briones at [facility]: kidney disease 11/24/2012    Lyme disease     Musculoskeletal disorder 2009    Dislocated Rt shoulder    Recurrent UTI     hx pyelo. saw urology.   took macrodantin age [age] year     Past Surgical History:   Procedure Laterality Date    HX ROTATOR CUFF REPAIR Right 05/07/2019    HX TONSILLECTOMY  2002    SHOULDER SURG PROC UNLISTED Right 12/2010    torn labrum and tendon repair/right     Social History     Socioeconomic History    Marital status: SINGLE     Spouse name: Not on file    Number of children: Not on file    Years of education: Not on file    Highest education level: Not on file   Occupational History    Occupation: [employer]   Tobacco Use    Smoking status: Never Smoker    Smokeless tobacco: Never Used   Substance and Sexual Activity    Alcohol use: Yes     Frequency: 4 or more times a week     Comment: no ETOH since 12/1/2017    Drug use: Yes     Types: Opiates     Comment: sober since 12/1/2017    Sexual activity: Yes     Birth control/protection: Pill   Social History Narrative    Working full time at [address], 40 hours/week + overtime    Not doing night shifts        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 4 Each 2    propranolol (INDERAL) 20 mg tablet Take  by mouth three (3) times daily. As needed      levonorgestrel-ethinyl estradiol (VIENVA) 0.1-20 mg-mcg tab Take 1 Tab by mouth daily.  busPIRone (BUSPAR) 10 mg tablet Take 1.5 Tabs by mouth three (3) times daily. (Patient taking differently: Take 20 mg by mouth three (3) times daily.) 90 Tab 1    FLUoxetine (PROZAC) 20 mg tablet Take 4 Tabs by mouth daily. 180 Tab 0     Allergies   Allergen Reactions    Bee Sting Kit Anaphylaxis     And wasps    Cefepime Hives and Swelling    Cephalosporins Anaphylaxis    Ciprofloxacin Anaphylaxis    Quinolones Anaphylaxis    Rocephin [Ceftriaxone] Hives    Zithromax [Azithromycin] Hives       Review of Systems - General ROS: negative for - chills, fatigue, fever, hot flashes or malaise  Cardiovascular ROS: no chest pain or dyspnea on exertion  Respiratory ROS: no cough, shortness of breath, or wheezing    Visit Vitals  /80 (BP 1 Location: Left arm, BP Patient Position: Sitting)   Pulse 80   Temp 98.2 °F (36.8 °C) (Oral)   Resp 18   Ht 5' 4\" (1.626 m)   Wt 239 lb 8 oz (108.6 kg)   LMP 04/06/2020   SpO2 96%   BMI 41.11 kg/m²     General Appearance:  Well developed, well nourished,alert and oriented x 3, and individual in no acute distress.    Ears/Nose/Mouth/Throat:   Hearing grossly normal.         Neck: Supple, no lad, no bruits   Chest:   Lungs clear to auscultation bilaterally. Cardiovascular:  Regular rate and rhythm, S1, S2 normal, no murmur. Abdomen:   Soft, non-tender, bowel sounds are active. Extremities: No edema bilaterally. Skin: Warm and dry, no suspicious lesions             Diagnoses and all orders for this visit:    1. Other depression  Continue Prozac      BMI 41  She is on naltrexone but she may benefit from GLP-1 agonist.  She will reach out to [facility] later    2.  History of drug use disorder  vivitrol given by [employer]

## 2020-04-21 DIAGNOSIS — F19.10 SUBSTANCE ABUSE (HCC): ICD-10-CM

## 2020-04-21 RX ORDER — NALTREXONE 380 MG
KIT INTRAMUSCULAR
Qty: 1 EACH | Refills: 2 | Status: SHIPPED | OUTPATIENT
Start: 2020-04-21 | End: 2020-05-07 | Stop reason: SDUPTHER

## 2020-05-07 ENCOUNTER — TELEPHONE (OUTPATIENT)
Dept: INTERNAL MEDICINE CLINIC | Age: 25
End: 2020-05-07

## 2020-05-07 DIAGNOSIS — F19.10 SUBSTANCE ABUSE (HCC): ICD-10-CM

## 2020-05-07 NOTE — TELEPHONE ENCOUNTER
Pt would like to have Vivitrol to be filled on 5/14/20. Please call to confirm if this is ok. Thanks.

## 2020-05-08 RX ORDER — NALTREXONE 380 MG
KIT INTRAMUSCULAR
Qty: 1 EACH | Refills: 2 | Status: SHIPPED | OUTPATIENT
Start: 2020-05-08 | End: 2020-08-20

## 2020-05-13 NOTE — TELEPHONE ENCOUNTER
Vivitrol rep is requesting to speak with the nurse to coordinate a delivery date and time fot patient's medication.  [phone]

## 2020-05-20 ENCOUNTER — HOSPITAL ENCOUNTER (OUTPATIENT)
Dept: LAB | Age: 25
Discharge: HOME | End: 2020-05-20

## 2020-05-20 ENCOUNTER — OFFICE VISIT (OUTPATIENT)
Dept: INTERNAL MEDICINE CLINIC | Age: 25
End: 2020-05-20

## 2020-05-20 VITALS
WEIGHT: 238.38 LBS | DIASTOLIC BLOOD PRESSURE: 65 MMHG | RESPIRATION RATE: 18 BRPM | OXYGEN SATURATION: 96 % | TEMPERATURE: 99 F | HEART RATE: 72 BPM | BODY MASS INDEX: 40.7 KG/M2 | HEIGHT: 64 IN | SYSTOLIC BLOOD PRESSURE: 102 MMHG

## 2020-05-20 DIAGNOSIS — Z51.81 MEDICATION MONITORING ENCOUNTER: ICD-10-CM

## 2020-05-20 DIAGNOSIS — Z51.81 MEDICATION MONITORING ENCOUNTER: Primary | ICD-10-CM

## 2020-05-20 DIAGNOSIS — F19.91 HISTORY OF DRUG USE: ICD-10-CM

## 2020-05-20 LAB
ALBUMIN SERPL-MCNC: 3.8 G/DL (ref 3.5–5)
ALBUMIN/GLOB SERPL: 1.1 {RATIO} (ref 1.1–2.2)
ALP SERPL-CCNC: 80 U/L (ref 45–117)
ALT SERPL-CCNC: 18 U/L (ref 12–78)
ANION GAP SERPL CALC-SCNC: 7 MMOL/L (ref 5–15)
AST SERPL-CCNC: 15 U/L (ref 15–37)
BILIRUB SERPL-MCNC: 0.6 MG/DL (ref 0.2–1)
BUN SERPL-MCNC: 14 MG/DL (ref 6–20)
BUN/CREAT SERPL: 17 (ref 12–20)
CALCIUM SERPL-MCNC: 9.1 MG/DL (ref 8.5–10.1)
CHLORIDE SERPL-SCNC: 105 MMOL/L (ref 97–108)
CO2 SERPL-SCNC: 23 MMOL/L (ref 21–32)
CREAT SERPL-MCNC: 0.83 MG/DL (ref 0.55–1.02)
EST. AVERAGE GLUCOSE BLD GHB EST-MCNC: 103 MG/DL
GLOBULIN SER CALC-MCNC: 3.5 G/DL (ref 2–4)
GLUCOSE SERPL-MCNC: 155 MG/DL (ref 65–100)
HBA1C MFR BLD: 5.2 % (ref 4–5.6)
POTASSIUM SERPL-SCNC: 4.3 MMOL/L (ref 3.5–5.1)
PROT SERPL-MCNC: 7.3 G/DL (ref 6.4–8.2)
SODIUM SERPL-SCNC: 135 MMOL/L (ref 136–145)

## 2020-05-20 RX ORDER — BUSPIRONE HYDROCHLORIDE 10 MG/1
20 TABLET ORAL 3 TIMES DAILY
COMMUNITY
End: 2021-03-19

## 2020-05-20 NOTE — PROGRESS NOTES
No chief complaint on file. Patient is here  for her Vivitrol injection. Her last injection was March 17, 2020. Since last visit she is still at the [facility]. She has been committed to work therefore 2 years. She received a signing bonus so she will be there for 2 years. She is acclimating to the facility and the children there. She has been there for about 8 months. Depression  Patient is on Prozac 80 mg. She does not think she needs a change in dose. She feels like she is quite stable. She is going with her family to [place] to visit her brother in June.    bmi   Stable  She has still not reached out to [address] [place] [address] however she did lose weight on her own. She is stabilized. Her last hemoglobin A1c was 4.9 in August.  She notes that she did have a period of fatigue and blood sugar was 106 after eating. History of drug use  Patient has been compliant with the HPNP program.  She will get her Vivitrol today. She needs repeat verification forms for her prior liver trials. She denies any right upper quadrant pain. Past Medical History:   Diagnosis Date    Anxiety 2010    Bee sting allergy     severe. Dr. Armand Gracia at [facility]: kidney disease 11/24/2012    Lyme disease     Musculoskeletal disorder 2009    Dislocated Rt shoulder    Recurrent UTI     hx pyelo. saw urology.   took macrodantin age [age] year     Past Surgical History:   Procedure Laterality Date    HX ROTATOR CUFF REPAIR Right 05/07/2019    HX TONSILLECTOMY  2002    SHOULDER SURG PROC UNLISTED Right 12/2010    torn labrum and tendon repair/right     Social History     Socioeconomic History    Marital status: SINGLE     Spouse name: Not on file    Number of children: Not on file    Years of education: Not on file    Highest education level: Not on file   Occupational History    Occupation: [employer]   Tobacco Use    Smoking status: Never Smoker    Smokeless tobacco: Never Used   Substance and Sexual Activity    Alcohol use: Yes     Frequency: 4 or more times a week     Comment: no ETOH since 12/1/2017    Drug use: Yes     Types: Opiates     Comment: sober since 12/1/2017    Sexual activity: Yes     Birth control/protection: Pill   Social History Narrative    Working full time at [address] [employer], 40 hours/week + overtime    Not doing night shifts        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  naltrexone microspheres (VivitroL) 380 mg ER injection INJECT 380MG INTRAMUSCULARLY EVERY 28 DAYS 1 Each 2    propranolol (INDERAL) 20 mg tablet Take  by mouth three (3) times daily. As needed      levonorgestrel-ethinyl estradiol (VIENVA) 0.1-20 mg-mcg tab Take 1 Tab by mouth daily.  FLUoxetine (PROZAC) 20 mg tablet Take 4 Tabs by mouth daily. 180 Tab 0     Allergies   Allergen Reactions    Bee Sting Kit Anaphylaxis     And wasps    Cefepime Hives and Swelling    Cephalosporins Anaphylaxis    Ciprofloxacin Anaphylaxis    Quinolones Anaphylaxis    Rocephin [Ceftriaxone] Hives    Zithromax [Azithromycin] Hives       Review of Systems - General ROS: negative for - chills, fatigue, fever, hot flashes or malaise  Cardiovascular ROS: no chest pain or dyspnea on exertion  Respiratory ROS: no cough, shortness of breath, or wheezing    Visit Vitals  /65 (BP 1 Location: Left arm, BP Patient Position: Sitting)   Pulse 72   Temp 99 °F (37.2 °C) (Oral)   Resp 18   Ht 5' 4\" (1.626 m)   Wt 238 lb 6 oz (108.1 kg)   SpO2 96%   BMI 40.92 kg/m²     General Appearance:  Well developed, well nourished,alert and oriented x 3, and individual in no acute distress. Ears/Nose/Mouth/Throat:   Hearing grossly normal.         Neck: Supple, no lad, no bruits   Chest:   Lungs clear to auscultation bilaterally. Cardiovascular:  Regular rate and rhythm, S1, S2 normal, no murmur. Abdomen:   Soft, non-tender, bowel sounds are active. Extremities: No edema bilaterally. Skin: Warm and dry, no suspicious lesions             Diagnoses and all orders for this visit:      History of drug use  Vivitrol administered      1. Medication monitoring encounter  -     METABOLIC PANEL, COMPREHENSIVE; Future  We will assess for side effects of vivitriol    -     HEMOGLOBIN A1C WITH EAG;  Future

## 2020-06-18 ENCOUNTER — OFFICE VISIT (OUTPATIENT)
Dept: INTERNAL MEDICINE CLINIC | Age: 25
End: 2020-06-18

## 2020-06-18 VITALS
HEART RATE: 86 BPM | SYSTOLIC BLOOD PRESSURE: 106 MMHG | HEIGHT: 64 IN | TEMPERATURE: 98.5 F | RESPIRATION RATE: 18 BRPM | DIASTOLIC BLOOD PRESSURE: 69 MMHG | OXYGEN SATURATION: 98 % | WEIGHT: 238 LBS | BODY MASS INDEX: 40.63 KG/M2

## 2020-06-18 DIAGNOSIS — Z91.030 BEE STING ALLERGY: Primary | ICD-10-CM

## 2020-06-18 DIAGNOSIS — Z51.81 MEDICATION MONITORING ENCOUNTER: ICD-10-CM

## 2020-06-18 RX ORDER — EPINEPHRINE 0.3 MG/.3ML
0.3 INJECTION SUBCUTANEOUS
Qty: 2 SYRINGE | Refills: 1 | Status: SHIPPED | OUTPATIENT
Start: 2020-06-18 | End: 2020-06-18

## 2020-06-18 NOTE — PROGRESS NOTES
Chief Complaint   Patient presents with    Injection     Patient is here  for her Vivitrol injection. Her last injection was May 20, 2020    Since last visit she is still at the [facility]. She has been committed to work therefore 2 years. She received a signing bonus so she will be there for 2 years. She is acclimating to the facility and the children there. Depression  Patient is on Prozac 80 mg. She does not think she needs a change in dose. She feels like she is quite stable. She will plan to go to [facility] with her family in 5 weeks    bmi   Her weight is stable. She has been limited to exercise with the [employer] precautions. History of drug use  Patient has been compliant with the [employer]NP program.  She will get her Vivitrol today. She needs repeat verification forms for her prior liver trials. She denies any right upper quadrant pain. Past Medical History:   Diagnosis Date    Anxiety 2010    Bee sting allergy     severe. Dr. Margy Oh at [facility]: kidney disease 11/24/2012    Lyme disease     Musculoskeletal disorder 2009    Dislocated Rt shoulder    Recurrent UTI     hx pyelo. saw urology.   took macrodantin age [age] year     Past Surgical History:   Procedure Laterality Date    HX ROTATOR CUFF REPAIR Right 05/07/2019    HX TONSILLECTOMY  2002    SHOULDER SURG PROC UNLISTED Right 12/2010    torn labrum and tendon repair/right     Social History     Socioeconomic History    Marital status: SINGLE     Spouse name: Not on file    Number of children: Not on file    Years of education: Not on file    Highest education level: Not on file   Occupational History    Occupation: [employer]   Tobacco Use    Smoking status: Never Smoker    Smokeless tobacco: Never Used   Substance and Sexual Activity    Alcohol use: Yes     Frequency: 4 or more times a week     Comment: no ETOH since 12/1/2017    Drug use: Yes     Types: Opiates     Comment: sober since 12/1/2017  Sexual activity: Yes     Birth control/protection: Pill   Social History Narrative    Working full time at [address], 40 hours/week + overtime    Not doing night shifts        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  naltrexone microspheres (VivitroL) 380 mg ER injection INJECT 380MG INTRAMUSCULARLY EVERY 28 DAYS 1 Each 2    propranolol (INDERAL) 20 mg tablet Take  by mouth three (3) times daily. As needed      levonorgestrel-ethinyl estradiol (VIENVA) 0.1-20 mg-mcg tab Take 1 Tab by mouth daily.  FLUoxetine (PROZAC) 20 mg tablet Take 4 Tabs by mouth daily. 180 Tab 0     Allergies   Allergen Reactions    Bee Sting Kit Anaphylaxis     And wasps    Cefepime Hives and Swelling    Cephalosporins Anaphylaxis    Ciprofloxacin Anaphylaxis    Quinolones Anaphylaxis    Rocephin [Ceftriaxone] Hives    Zithromax [Azithromycin] Hives       Review of Systems - General ROS: negative for - chills, fatigue, fever, hot flashes or malaise  Cardiovascular ROS: no chest pain or dyspnea on exertion  Respiratory ROS: no cough, shortness of breath, or wheezing    Visit Vitals  /69   Pulse 86   Temp 98.5 °F (36.9 °C)   Resp 18   Ht 5' 4\" (1.626 m)   Wt 238 lb (108 kg)   SpO2 98%   BMI 40.85 kg/m²     General Appearance:  Well developed, well nourished,alert and oriented x 3, and individual in no acute distress. Ears/Nose/Mouth/Throat:   Hearing grossly normal.         Neck: Supple, no lad, no bruits   Chest:   Lungs clear to auscultation bilaterally. Cardiovascular:  Regular rate and rhythm, S1, S2 normal, no murmur.    Abdomen:   Soft, non-tender, bowel sounds are active. Extremities: No edema bilaterally. Skin: Warm and dry, no suspicious lesions             Diagnoses and all orders for this visit:      History of drug use  Vivitrol administered      Diagnoses and all orders for this visit:    1. Bee sting allergy  -     EPINEPHrine (EpiPen 2-Tony) 0.3 mg/0.3 mL injection; 0.3 mL by IntraMUSCular route once as needed for Allergic Response for up to 1 dose. 2. Medication monitoring encounter  Patient's Vivitorol was given today by Farrukh Salgado.

## 2020-06-18 NOTE — PROGRESS NOTES
Vivitrol 380mg IM administered to the right gluteus. [address] [facility] 47: [id number]. Lot: [id number]. Exp: 9/30/2021. Patient tolerated well.

## 2020-07-13 NOTE — PROGRESS NOTES
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, it is recommended that he is in a seat for the majority of the day, he may on stand and walk for 15 mins at a time  Please do not allow patient to lift anything over 10lbs, patient is unable to squat at this time to pick anything up                 Diagnoses and all orders for this visit:    Acute pain of right knee  -     XR knee 3 vw right non injury; Future  -     meloxicam (MOBIC) 15 mg tablet; Take 1 tablet (15 mg total) by mouth daily  -     Ambulatory referral to Physical Therapy; Future    Other orders  -     Discontinue: IBUPROFEN PO; Take by mouth          Subjective:      Patient ID: Candie Dallas is a 31 y o  male  Patient presents today with complaints of right knee pain  Patient reports injury happened at work on 07/10/2020 around 11 20 or 11:30 a m  [employer] Patient currently works for [employer]  Patient was climbing on to an , when he felt a sharp pain to his right knee  Patient had originally lifted his right knee on to the platform and put full weight on right knee to lift the rest of his body forward  At that time he had felt a sharp pain and burning sensation under and around his knee cap  Patient denies any clicking or popping or giving way of his knee  Patient reports a very mild discomfort while at rest however when working on his feet for 10 hours he states that the pain is around 6 or 7  Patient denies any past injury to his knee  Knee Pain    The incident occurred 3 to 5 days ago  The incident occurred at work  The pain is present in the right knee  The quality of the pain is described as burning   Pertinent negatives include no Vivitrol 380mg/vial administered to pts left gluteus. Pt tolerated well. LOT: [id number]. EXP 12/31/2021. Chaitanya Oseguera [address]: [id number]. [address] [employer] Monthly Witnessed Vivitrol form signed, copy made to be scanned into pts chart. Original given to pt. inability to bear weight, loss of motion, loss of sensation, muscle weakness, numbness or tingling  He reports no foreign bodies present  The symptoms are aggravated by weight bearing and movement  He has tried nothing for the symptoms  The treatment provided no relief  The following portions of the patient's history were reviewed and updated as appropriate: allergies, current medications, past family history, past medical history, past social history, past surgical history and problem list     Review of Systems   Constitutional: Negative for activity change, appetite change, chills, diaphoresis and fever  HENT: Negative for congestion, ear discharge, ear pain, postnasal drip, rhinorrhea, sinus pressure, sinus pain and sore throat  Eyes: Negative for pain, discharge, itching and visual disturbance  Respiratory: Negative for cough, chest tightness, shortness of breath and wheezing  Cardiovascular: Negative for chest pain, palpitations and leg swelling  Gastrointestinal: Negative for abdominal pain, constipation, diarrhea, nausea and vomiting  Endocrine: Negative for polydipsia, polyphagia and polyuria  Genitourinary: Negative for difficulty urinating, dysuria and urgency  Musculoskeletal: Positive for arthralgias (Right knee pain)  Negative for back pain and neck pain  Skin: Negative for rash and wound  Neurological: Negative for dizziness, tingling, weakness, numbness and headaches  History reviewed  No pertinent past medical history        Current Outpatient Medications:     meloxicam (MOBIC) 15 mg tablet, Take 1 tablet (15 mg total) by mouth daily, Disp: 90 tablet, Rfl: 0    No Known Allergies    Social History   Past Surgical History:   Procedure Laterality Date    APPENDECTOMY       Family History   Problem Relation Age of Onset    Multiple sclerosis Mother        Objective:  /80 (BP Location: Left arm, Patient Position: Sitting, Cuff Size: Large)   Pulse 73   Temp 98 6 °F (37 °C) (Temporal)   Ht 5' 10 5" (1 791 m)   Wt 93 kg (205 lb)   SpO2 99%   BMI 29 00 kg/m²     No results found for this or any previous visit (from the past 1344 hour(s))  Physical Exam   Constitutional: He is oriented to person, place, and time  He appears well-developed and well-nourished  No distress  HENT:   Head: Normocephalic and atraumatic  Right Ear: External ear normal    Left Ear: External ear normal    Nose: Nose normal    Mouth/Throat: Oropharynx is clear and moist  No oropharyngeal exudate  Eyes: Pupils are equal, round, and reactive to light  Conjunctivae and EOM are normal  Right eye exhibits no discharge  Left eye exhibits no discharge  Neck: Normal range of motion  Neck supple  No thyromegaly present  Cardiovascular: Normal rate, regular rhythm, normal heart sounds and intact distal pulses  Exam reveals no gallop and no friction rub  No murmur heard  Pulmonary/Chest: Effort normal and breath sounds normal  No stridor  No respiratory distress  He has no wheezes  He has no rales  Abdominal: Soft  Bowel sounds are normal  He exhibits no distension  There is no tenderness  Musculoskeletal:        Right knee: He exhibits swelling and bony tenderness  He exhibits normal range of motion, no effusion, no ecchymosis, no deformity, normal alignment, no LCL laxity, normal meniscus and no MCL laxity  Tenderness found  Medial joint line tenderness noted  Lymphadenopathy:     He has no cervical adenopathy  Neurological: He is alert and oriented to person, place, and time  Skin: Skin is warm and dry  No rash noted  He is not diaphoretic  No erythema  Psychiatric: He has a normal mood and affect   His behavior is normal  Judgment and thought content normal

## 2020-07-16 ENCOUNTER — OFFICE VISIT (OUTPATIENT)
Dept: INTERNAL MEDICINE CLINIC | Age: 25
End: 2020-07-16

## 2020-07-16 VITALS
WEIGHT: 234 LBS | TEMPERATURE: 98.2 F | OXYGEN SATURATION: 96 % | SYSTOLIC BLOOD PRESSURE: 113 MMHG | DIASTOLIC BLOOD PRESSURE: 81 MMHG | RESPIRATION RATE: 12 BRPM | BODY MASS INDEX: 39.95 KG/M2 | HEART RATE: 79 BPM | HEIGHT: 64 IN

## 2020-07-16 DIAGNOSIS — Z51.81 MEDICATION MONITORING ENCOUNTER: ICD-10-CM

## 2020-07-16 DIAGNOSIS — F19.91 HISTORY OF DRUG USE DISORDER: Primary | ICD-10-CM

## 2020-07-16 DIAGNOSIS — F32.A DEPRESSION, UNSPECIFIED DEPRESSION TYPE: ICD-10-CM

## 2020-07-16 NOTE — PROGRESS NOTES
Vivitrol 380mg/vial administered to pts left gluteus. Oumar [address]: [id number]. Lot: [id number].  Exp: 11/30/2021

## 2020-07-16 NOTE — PROGRESS NOTES
Chief Complaint   Patient presents with    Medication Evaluation     Injection      Patient is here  for her Vivitrol injection. Her last injection was June 18, 2020    Patient is still at [facility] facility. She will be there for another 2 years. Depression  Patient is on Prozac 80 mg. She does not think she needs a change in dose. She feels like she is quite stable. She is back from her trip with her family from [facility]. She has been home for 2 weeks. She is working at an [employer] in the evenings. She has some fatigue today. bmi   Her weight is stable. She has started Nutrisystem and has lost about 12 pounds. History of drug use  Patient has been compliant with the HPNP program.  She will get her Vivitrol today. She needs repeat verification forms for her prior liver trials. She denies any right upper quadrant pain. Past Medical History:   Diagnosis Date    Anxiety 2010    Bee sting allergy     severe. Dr. Reymundo Sandoval at [facility]: kidney disease 11/24/2012    Lyme disease     Musculoskeletal disorder 2009    Dislocated Rt shoulder    Recurrent UTI     hx pyelo. saw urology.   took macrodantin age 16 3 year     Past Surgical History:   Procedure Laterality Date    HX ROTATOR CUFF REPAIR Right 05/07/2019    HX TONSILLECTOMY  2002    SHOULDER SURG PROC UNLISTED Right 12/2010    torn labrum and tendon repair/right     Social History     Socioeconomic History    Marital status: SINGLE     Spouse name: Not on file    Number of children: Not on file    Years of education: Not on file    Highest education level: Not on file   Occupational History    Occupation: [employer]   Tobacco Use    Smoking status: Never Smoker    Smokeless tobacco: Never Used   Substance and Sexual Activity    Alcohol use: Yes     Frequency: 4 or more times a week     Comment: no ETOH since 12/1/2017    Drug use: Yes     Types: Opiates     Comment: sober since 12/1/2017    Sexual activity: Yes     Birth control/protection: Pill   Social History Narrative    Working full time at [employer], 40 hours/week + overtime    Not doing night shifts        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  naltrexone microspheres (VivitroL) 380 mg ER injection INJECT 380MG INTRAMUSCULARLY EVERY 28 DAYS 1 Each 2    propranolol (INDERAL) 20 mg tablet Take  by mouth three (3) times daily. As needed      levonorgestrel-ethinyl estradiol (VIENVA) 0.1-20 mg-mcg tab Take 1 Tab by mouth daily.  FLUoxetine (PROZAC) 20 mg tablet Take 4 Tabs by mouth daily. 180 Tab 0     Allergies   Allergen Reactions    Bee Sting Kit Anaphylaxis     And wasps    Cefepime Hives and Swelling    Cephalosporins Anaphylaxis    Ciprofloxacin Anaphylaxis    Quinolones Anaphylaxis    Rocephin [Ceftriaxone] Hives    Zithromax [Azithromycin] Hives       Review of Systems - General ROS: negative for - chills, fatigue, fever, hot flashes or malaise  Cardiovascular ROS: no chest pain or dyspnea on exertion  Respiratory ROS: no cough, shortness of breath, or wheezing    Visit Vitals  /81 (BP 1 Location: Left arm, BP Patient Position: Sitting)   Pulse 79   Temp 98.2 °F (36.8 °C) (Oral)   Resp 12   Ht 5' 4\" (1.626 m)   Wt 234 lb (106.1 kg)   LMP 07/02/2020 (Approximate)   SpO2 96%   BMI 40.17 kg/m²     General Appearance:  Well developed, well nourished,alert and oriented x 3, and individual in no acute distress. Ears/Nose/Mouth/Throat:   Hearing grossly normal.         Neck: Supple, no lad, no bruits   Chest:   Lungs clear to auscultation bilaterally.    Cardiovascular:  Regular rate and rhythm, S1, S2 normal, no murmur. Abdomen:   Soft, non-tender, bowel sounds are active. Extremities: No edema bilaterally. Skin: Warm and dry, no suspicious lesions             Diagnoses and all orders for this visit:    1. History of drug use disorder  Stable  Patient is due for her Vivitrol no side effects  Continue meds  Patient has not had any suspicious behavior    2.  Medication monitoring encounter    Depression  Stable  Continue meds

## 2020-08-20 DIAGNOSIS — F19.10 SUBSTANCE ABUSE (HCC): ICD-10-CM

## 2020-08-20 RX ORDER — NALTREXONE 380 MG
KIT INTRAMUSCULAR
Qty: 4 EACH | Refills: 3 | Status: SHIPPED | OUTPATIENT
Start: 2020-08-20 | End: 2021-02-19 | Stop reason: SDUPTHER

## 2020-08-26 ENCOUNTER — OFFICE VISIT (OUTPATIENT)
Dept: INTERNAL MEDICINE CLINIC | Age: 25
End: 2020-08-26
Payer: COMMERCIAL

## 2020-08-26 VITALS
SYSTOLIC BLOOD PRESSURE: 109 MMHG | HEART RATE: 76 BPM | WEIGHT: 231.8 LBS | TEMPERATURE: 98.2 F | BODY MASS INDEX: 39.57 KG/M2 | HEIGHT: 64 IN | RESPIRATION RATE: 14 BRPM | DIASTOLIC BLOOD PRESSURE: 76 MMHG | OXYGEN SATURATION: 96 %

## 2020-08-26 DIAGNOSIS — F32.89 OTHER DEPRESSION: ICD-10-CM

## 2020-08-26 DIAGNOSIS — F19.91 HISTORY OF INTRAVENOUS DRUG USE IN REMISSION: Primary | ICD-10-CM

## 2020-08-26 PROCEDURE — 99213 OFFICE O/P EST LOW 20 MIN: CPT | Performed by: INTERNAL MEDICINE

## 2020-08-26 NOTE — PROGRESS NOTES
Vivitrol 380mg administered IM in left gluteus. Pt tolerated well. LOT: [id number]. Exp: 11/31/2021.  Oumar King 47: [id number]

## 2020-08-26 NOTE — PROGRESS NOTES
Chief Complaint   Patient presents with    Follow-up    Immunization/Injection     Vivitrol inj     Patient is here  for her Vivitrol injection. Patient is still at [facility]. She will be there for another 2 years. Depression  Patient is on Prozac 80 mg. She does not think she needs a change in dose. She feels like she is quite stable. Her brother bought a home in [place] so she will likely be helping him to fix it out.    bmi   Her weight is stable. She has started Nutrisystem and has lost additional weight. History of drug use  Patient has been compliant with the HPNP program.  She will get her Vivitrol today. She needs repeat verification forms for her prior liver trials. She denies any right upper quadrant pain. Past Medical History:   Diagnosis Date    Anxiety 2010    Bee sting allergy     severe. [facility] Zurdo [facility] at [facility]: kidney disease 11/24/2012    Lyme disease     Musculoskeletal disorder 2009    Dislocated Rt shoulder    Recurrent UTI     hx pyelo. saw urology.   took macrodantin age [age] year     Past Surgical History:   Procedure Laterality Date    HX ROTATOR CUFF REPAIR Right 05/07/2019    HX TONSILLECTOMY  2002    SHOULDER SURG PROC UNLISTED Right 12/2010    torn labrum and tendon repair/right     Social History     Socioeconomic History    Marital status: SINGLE     Spouse name: Not on file    Number of children: Not on file    Years of education: Not on file    Highest education level: Not on file   Occupational History    Occupation: [employer]   Tobacco Use    Smoking status: Never Smoker    Smokeless tobacco: Never Used   Substance and Sexual Activity    Alcohol use: Yes     Frequency: 4 or more times a week     Comment: no ETOH since 12/1/2017    Drug use: Yes     Types: Opiates     Comment: sober since 12/1/2017    Sexual activity: Yes     Birth control/protection: Pill   Social History Narrative    Working full time at [employer]. 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  propranolol (INDERAL) 20 mg tablet Take  by mouth three (3) times daily. As needed      levonorgestrel-ethinyl estradiol (VIENVA) 0.1-20 mg-mcg tab Take 1 Tab by mouth daily.  FLUoxetine (PROZAC) 20 mg tablet Take 4 Tabs by mouth daily. 180 Tab 0     Allergies   Allergen Reactions    Bee Sting Kit Anaphylaxis     And wasps    Cefepime Hives and Swelling    Cephalosporins Anaphylaxis    Ciprofloxacin Anaphylaxis    Quinolones Anaphylaxis    Rocephin [Ceftriaxone] Hives    Zithromax [Azithromycin] Hives       Review of Systems - General ROS: negative for - chills, fatigue, fever, hot flashes or malaise  Cardiovascular ROS: no chest pain or dyspnea on exertion  Respiratory ROS: no cough, shortness of breath, or wheezing    Visit Vitals  /76 (BP 1 Location: Left arm, BP Patient Position: Sitting)   Pulse 76   Temp 98.2 °F (36.8 °C) (Oral)   Resp 14   Ht 5' 4\" (1.626 m)   Wt 231 lb 12.8 oz (105.1 kg)   LMP 08/23/2020   SpO2 96%   BMI 39.79 kg/m²     General Appearance:  Well developed, well nourished,alert and oriented x 3, and individual in no acute distress. Ears/Nose/Mouth/Throat:   Hearing grossly normal.         Neck: Supple, no lad, no bruits   Chest:   Lungs clear to auscultation bilaterally. Cardiovascular:  Regular rate and rhythm, S1, S2 normal, no murmur. Abdomen:   Soft, non-tender, bowel sounds are active. Extremities: No edema bilaterally.     Skin: Warm and dry, no suspicious lesions             Diagnoses and all orders for this visit:    1. History of intravenous drug use in remission  stable    2. BMI 39.0-39.9,adult  Improving and continues to lose weight  Commended efforts and continue plan    3. Other depression  No change in medications  Follow-up with psychiatry      Follow-up in 1 month  We will recheck patient's sodium level at her next visit. She is on a higher dose of Prozac and will recheck for hyponatremia. She is currently not symptomatic.

## 2020-09-18 ENCOUNTER — TELEPHONE (OUTPATIENT)
Dept: INTERNAL MEDICINE CLINIC | Age: 25
End: 2020-09-18

## 2020-09-18 NOTE — TELEPHONE ENCOUNTER
----- Message from Savanah Georgia sent at 9/17/2020  6:48 PM EDT -----  Regarding: Dr. Stephenie Atkinson first and last name: Maryana Araiza (specialty [facility])   Reason for call: Requesting authorization to ship medication to practice. Callback required yes/no and why: yes  Best contact number(s): [phone]  Details to clarify the request: Name of medication is \"Vivitrol\".

## 2020-09-23 ENCOUNTER — OFFICE VISIT (OUTPATIENT)
Dept: INTERNAL MEDICINE CLINIC | Age: 25
End: 2020-09-23
Payer: COMMERCIAL

## 2020-09-23 VITALS
HEART RATE: 82 BPM | BODY MASS INDEX: 40.33 KG/M2 | RESPIRATION RATE: 16 BRPM | HEIGHT: 64 IN | DIASTOLIC BLOOD PRESSURE: 76 MMHG | TEMPERATURE: 98.9 F | SYSTOLIC BLOOD PRESSURE: 114 MMHG | WEIGHT: 236.2 LBS | OXYGEN SATURATION: 95 %

## 2020-09-23 DIAGNOSIS — F32.A DEPRESSION, UNSPECIFIED DEPRESSION TYPE: Primary | ICD-10-CM

## 2020-09-23 DIAGNOSIS — Z51.81 MEDICATION MONITORING ENCOUNTER: ICD-10-CM

## 2020-09-23 DIAGNOSIS — F19.91 HISTORY OF INTRAVENOUS DRUG USE IN REMISSION: ICD-10-CM

## 2020-09-23 PROCEDURE — 99213 OFFICE O/P EST LOW 20 MIN: CPT | Performed by: INTERNAL MEDICINE

## 2020-09-23 RX ORDER — EPINEPHRINE 0.3 MG/.3ML
INJECTION SUBCUTANEOUS
COMMUNITY
Start: 2020-06-18

## 2020-09-23 NOTE — PROGRESS NOTES
Diagnoses and all orders for this visit:    Patient presented today for her Vivitrol injection. She appears to be stable and in overall good health. Her weight is stable. She will follow-up in 1 month for her next injection. Diagnoses and all orders for this visit:    1. Depression, unspecified depression type    Stable  No side effects on Prozac 80 mg and BuSpar 60 mg  Doing well at work. 2. History of intravenous drug use in remission  -     [place] THER/PROPH/DIAG INJECTION, SUBCUT/IM    3. Medication monitoring encounter  -     [place] THER/PROPH/DIAG INJECTION, SUBCUT/IM        Chief Complaint   Patient presents with    Immunization/Injection     History of substance abuse. Patient reports she is quite stable. She is still working and stress level is the same. She notes her depression and anxiety are well controlled. She has not had any side effects on vitamin B12. Past Medical History:   Diagnosis Date    Anxiety 2010    Bee sting allergy     severe. [facility] Alexia Reynolds at [facility]: kidney disease 11/24/2012    Lyme disease     Musculoskeletal disorder 2009    Dislocated Rt shoulder    Recurrent UTI     hx pyelo. saw urology.   took macrodantin age [age] year     Past Surgical History:   Procedure Laterality Date    HX ROTATOR CUFF REPAIR Right 05/07/2019    HX TONSILLECTOMY  2002    SHOULDER SURG PROC UNLISTED Right 12/2010    torn labrum and tendon repair/right     Social History     Socioeconomic History    Marital status: SINGLE     Spouse name: Not on file    Number of children: Not on file    Years of education: Not on file    Highest education level: Not on file   Occupational History    Occupation: [employer]   Tobacco Use    Smoking status: Never Smoker    Smokeless tobacco: Never Used   Substance and Sexual Activity    Alcohol use: Yes     Frequency: 4 or more times a week     Comment: no ETOH since 12/1/2017    Drug use: Yes     Types: Opiates     Comment: sober since 12/1/2017   Christiano Sexual activity: Yes     Birth control/protection: Pill   Social History Narrative    Working full time at [address], 40 hours/week + overtime    Not doing night shifts        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  propranolol (INDERAL) 20 mg tablet Take  by mouth three (3) times daily. As needed      levonorgestrel-ethinyl estradiol (VIENVA) 0.1-20 mg-mcg tab Take 1 Tab by mouth daily.  FLUoxetine (PROZAC) 20 mg tablet Take 4 Tabs by mouth daily.  180 Tab 0    EPINEPHrine (EPIPEN) 0.3 mg/0.3 mL injection        Allergies   Allergen Reactions    Bee Sting Kit Anaphylaxis     And wasps    Cefepime Hives and Swelling    Cephalosporins Anaphylaxis    Ciprofloxacin Anaphylaxis    Quinolones Anaphylaxis    Rocephin [Ceftriaxone] Hives    Zithromax [Azithromycin] Hives       Review of Systems - General ROS: negative for - chills, fatigue, fever or hot flashes  Cardiovascular ROS: no chest pain or dyspnea on exertion  Respiratory ROS: no cough, shortness of breath, or wheezing    Visit Vitals  /76 (BP 1 Location: Left arm, BP Patient Position: Sitting)   Pulse 82   Temp 98.9 °F (37.2 °C) (Oral)   Resp 16   Ht 5' 4\" (1.626 m)   Wt 236 lb 3.2 oz (107.1 kg)   SpO2 95%   BMI 40.54 kg/m²           Constitutional: [x] Appears well-developed and well-nourished [x] No apparent distress      [] Abnormal -BMI 40/base  Mental status: [x] Alert and awake  [x] Oriented to person/place/time [x] Able to follow commands    [] Abnormal -     Eyes:   EOM    [x]  Normal    [] Abnormal - Sclera  [x]  Normal    [] Abnormal -          Discharge [x]  None visible   [] Abnormal -     HENT: [x] Normocephalic, atraumatic  [] Abnormal -   [x] Mouth/Throat: Mucous membranes are moist    External Ears [x] Normal  [] Abnormal -    Neck: [x] No visualized mass [] Abnormal -     Pulmonary/Chest: [x] Respiratory effort normal   [x] No visualized signs of difficulty breathing or respiratory distress        [] Abnormal -      Musculoskeletal:   [x] Normal gait with no signs of ataxia         [x] Normal range of motion of neck        [] Abnormal -     Neurological:        [x] No Facial Asymmetry (Cranial nerve 7 motor function) (limited exam due to video visit)          [x] No gaze palsy        [] Abnormal -          Skin:        [x] No significant exanthematous lesions or discoloration noted on facial skin         [] Abnormal -            Psychiatric:       [x] Normal Affect [] Abnormal -        [x] No Hallucinations

## 2020-09-23 NOTE — PROGRESS NOTES
Vivitrol 380mg administered IM in left gluteus. Pt tolerated well. No issues and form filled out and provided to patient. Patient able to leave office with no issue. LOT: [id number]. Exp: 08/31/2022.  NDC: 59877-654-92    Rivka Ruiz, PharmD, BCPS, CDCES

## 2020-09-23 NOTE — PROGRESS NOTES
Chief Complaint   Patient presents with    Immunization/Injection     Visit Vitals  /76 (BP 1 Location: Left arm, BP Patient Position: Sitting)   Pulse 82   Temp 98.9 °F (37.2 °C) (Oral)   Resp 16   Ht 5' 4\" (1.626 m)   Wt 236 lb 3.2 oz (107.1 kg)   SpO2 95%   BMI 40.54 kg/m²     1. Have you been to the ER, urgent care clinic since your last visit? Hospitalized since your last visit?no    2. Have you seen or consulted any other health care providers outside of the [address] [place] [address] since your last visit? Include any pap smears or colon screening.  no

## 2020-10-14 ENCOUNTER — TELEPHONE (OUTPATIENT)
Dept: INTERNAL MEDICINE CLINIC | Age: 25
End: 2020-10-14

## 2020-10-14 NOTE — TELEPHONE ENCOUNTER
----- Message from Niyah Blackburn sent at 10/14/2020  5:05 PM EDT -----  Regarding: Dr. Srini Valdez first and last name: Pt  Reason for call: Requesting vivitrol injection. Callback required yes/no and why: yes  Best contact number(s): [phone]  Details to clarify the request: Stated that she can come either on the 21, 22, 26, or 27.

## 2020-10-22 ENCOUNTER — DOCUMENTATION ONLY (OUTPATIENT)
Dept: INTERNAL MEDICINE CLINIC | Age: 25
End: 2020-10-22

## 2020-10-22 ENCOUNTER — OFFICE VISIT (OUTPATIENT)
Dept: INTERNAL MEDICINE CLINIC | Age: 25
End: 2020-10-22

## 2020-10-22 VITALS
BODY MASS INDEX: 40.29 KG/M2 | DIASTOLIC BLOOD PRESSURE: 79 MMHG | TEMPERATURE: 97.8 F | RESPIRATION RATE: 16 BRPM | WEIGHT: 236 LBS | HEIGHT: 64 IN | HEART RATE: 88 BPM | SYSTOLIC BLOOD PRESSURE: 111 MMHG | OXYGEN SATURATION: 96 %

## 2020-10-22 DIAGNOSIS — Z12.4 CERVICAL CANCER SCREENING: ICD-10-CM

## 2020-10-22 DIAGNOSIS — Z51.81 MEDICATION MONITORING ENCOUNTER: Primary | ICD-10-CM

## 2020-10-22 PROCEDURE — 99213 OFFICE O/P EST LOW 20 MIN: CPT | Performed by: INTERNAL MEDICINE

## 2020-10-22 NOTE — PROGRESS NOTES
Nickie Burt is a 31 y.o. female  Chief Complaint   Patient presents with    Immunization/Injection     Health Maintenance Due   Topic Date Due    PAP AKA CERVICAL CYTOLOGY  01/01/2018    Flu Vaccine (1) 09/01/2020     Visit Vitals  /79 (BP 1 Location: Left arm, BP Patient Position: Sitting)   Pulse 88   Temp 97.8 °F (36.6 °C) (Oral)   Resp 16   Ht 5' 4\" (1.626 m)   Wt 236 lb (107 kg)   SpO2 96%   BMI 40.51 kg/m²     1. Have you been to the ER, urgent care clinic since your last visit? Hospitalized since your last visit? No     2. Have you seen or consulted any other health care providers outside of the [address] [place] [address] since your last visit? Include any pap smears or colon screening.  No

## 2020-10-22 NOTE — PROGRESS NOTES
Diagnoses and all orders for this visit:    Patient presented today for her Vivitrol injection. She appears to be stable and in overall good health. Her weight is stable. She will follow-up in 1 month for her next injection. Diagnoses and all orders for this visit:    1. Medication monitoring encounter   she has been on Vivitrol for close to 1 year. We will check her liver proteins for monitoring    -     METABOLIC PANEL, COMPREHENSIVE; Future    2. Cervical cancer screening  Patient is getting her control pills online. She is due for her Pap and pelvic. She will try to schedule with [facility] to discuss contraception.  -     REFERRAL TO OBSTETRICS AND GYNECOLOGY        Chief Complaint   Patient presents with    Immunization/Injection     Since last visit patient has been doing very well and work is still quite stable. She bought a house near [address] [place] [address] Road will close November 10th. Anxiety  Patient is followed by Dr. Robin Becker monthly. There has not been any changes in her medications. She is taking Prozac 80 mg daily, BuSpar 20 mg 3 times daily and as needed propanolol. She notes she has not had to use her propanolol in several months. History of substance use  She is close to 1 year of being on Vivitrol she is not having any side effects. She will have another reevaluation and will be requesting transition to quarterly psychiatry visits and discontinuing group therapy if allowed. She anticipates they will have her continue to take Vivitrol. Past Medical History:   Diagnosis Date    Anxiety 2010    Bee sting allergy     severe. Dr. Brianna Nicole at [facility]: kidney disease 11/24/2012    Lyme disease     Musculoskeletal disorder 2009    Dislocated Rt shoulder    Recurrent UTI     hx pyelo. saw urology.   took macrodantin age 16 3 year     Past Surgical History:   Procedure Laterality Date    HX ROTATOR CUFF REPAIR Right 05/07/2019    HX TONSILLECTOMY  2002    SHOULDER SURG [address] UNLISTED Right 12/2010    torn labrum and tendon repair/right     Social History     Socioeconomic History    Marital status: SINGLE     Spouse name: Not on file    Number of children: Not on file    Years of education: Not on file    Highest education level: Not on file   Occupational History    Occupation: [employer]   Tobacco Use    Smoking status: Never Smoker    Smokeless tobacco: Never Used   Substance and Sexual Activity    Alcohol use: Yes     Frequency: 4 or more times a week     Comment: no ETOH since 12/1/2017    Drug use: Yes     Types: Opiates     Comment: sober since 12/1/2017    Sexual activity: Yes     Birth control/protection: Pill   Social History Narrative    Working full time at [address] [place] [address], 40 hours/week + overtime    Not doing night shifts        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  propranolol (INDERAL) 20 mg tablet Take  by mouth three (3) times daily. As needed      levonorgestrel-ethinyl estradiol (VIENVA) 0.1-20 mg-mcg tab Take 1 Tab by mouth daily.  FLUoxetine (PROZAC) 20 mg tablet Take 4 Tabs by mouth daily.  180 Tab 0     Allergies   Allergen Reactions    Bee Sting Kit Anaphylaxis     And wasps    Cefepime Hives and Swelling    Cephalosporins Anaphylaxis    Ciprofloxacin Anaphylaxis    Quinolones Anaphylaxis    Rocephin [Ceftriaxone] Hives    Zithromax [Azithromycin] Hives       Review of Systems - General ROS: negative for - chills, fatigue, fever or hot flashes  Cardiovascular ROS: no chest pain or dyspnea on exertion  Respiratory ROS: no cough, shortness of breath, or wheezing    Visit Vitals  /79 (BP 1 Location: Left arm, BP Patient Position: Sitting)   Pulse 88   Temp 97.8 °F (36.6 °C) (Oral)   Resp 16   Ht 5' 4\" (1.626 m)   Wt 236 lb (107 kg)   SpO2 96%   BMI 40.51 kg/m²           Constitutional: [x] Appears well-developed and well-nourished [x] No apparent distress      [] Abnormal -BMI 40/base  Mental status: [x] Alert and awake  [x] Oriented to person/place/time [x] Able to follow commands    [] Abnormal -     Eyes:   EOM    [x]  Normal    [] Abnormal -   Sclera  [x]  Normal    [] Abnormal -          Discharge [x]  None visible   [] Abnormal -     HENT: [x] Normocephalic, atraumatic  [] Abnormal -   [x] Mouth/Throat: Mucous membranes are moist    External Ears [x] Normal  [] Abnormal -    Neck: [x] No visualized mass [] Abnormal -     Pulmonary/Chest: [x] Respiratory effort normal   [x] No visualized signs of difficulty breathing or respiratory distress        [] Abnormal -     GI: Abdomen no tenderness to palpation 4 quadrants, no hepatomegaly or epigastric tenderness.      Musculoskeletal:   [x] Normal gait with no signs of ataxia         [x] Normal range of motion of neck        [] Abnormal -     Neurological:        [x] No Facial Asymmetry (Cranial nerve 7 motor function) (limited exam due to video visit)          [x] No gaze palsy        [] Abnormal -          Skin:        [x] No significant exanthematous lesions or discoloration noted on facial skin         [] Abnormal -            Psychiatric:       [x] Normal Affect [] Abnormal -        [x] No Hallucinations

## 2020-10-22 NOTE — PROGRESS NOTES
Vivitrol 380mg injected into right gluteus. [facility] [id number]. Lot [id number].  Exp 08/31/2022

## 2020-11-19 ENCOUNTER — TELEPHONE (OUTPATIENT)
Dept: INTERNAL MEDICINE CLINIC | Age: 25
End: 2020-11-19

## 2020-11-19 NOTE — TELEPHONE ENCOUNTER
----- Message from [place] sent at 11/19/2020 12:33 PM EST -----  Regarding: Dr. Leopoldo Seamen  General Message/Vendor Calls    Caller's first and last name: Brittany Witt      Reason for call: Requesting a call back to reschedule routine visit for her injection. Pt needs appt before the end of the month.        Callback required yes/no and why: yes      Best contact number(s): [phone]      Details to clarify the request: [facility]

## 2020-11-23 ENCOUNTER — OFFICE VISIT (OUTPATIENT)
Dept: INTERNAL MEDICINE CLINIC | Age: 25
End: 2020-11-23
Payer: COMMERCIAL

## 2020-11-23 VITALS
HEIGHT: 64 IN | OXYGEN SATURATION: 96 % | SYSTOLIC BLOOD PRESSURE: 110 MMHG | BODY MASS INDEX: 40.46 KG/M2 | WEIGHT: 237 LBS | DIASTOLIC BLOOD PRESSURE: 66 MMHG | HEART RATE: 104 BPM | RESPIRATION RATE: 14 BRPM | TEMPERATURE: 98.6 F

## 2020-11-23 DIAGNOSIS — F41.9 ANXIETY: Primary | ICD-10-CM

## 2020-11-23 DIAGNOSIS — F19.11 HISTORY OF SUBSTANCE ABUSE (HCC): ICD-10-CM

## 2020-11-23 PROCEDURE — 99213 OFFICE O/P EST LOW 20 MIN: CPT | Performed by: INTERNAL MEDICINE
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
## 2020-11-23 NOTE — PROGRESS NOTES
Vivitrol 380mg/mL injected to left gluteus. Pt tolerated well.  [address]: [id number] LOT: [id number] Exp: 08/31/2022

## 2020-12-17 ENCOUNTER — TELEPHONE (OUTPATIENT)
Dept: INTERNAL MEDICINE CLINIC | Age: 25
End: 2020-12-17

## 2020-12-17 NOTE — TELEPHONE ENCOUNTER
----- Message from April M Phu Patel sent at 12/17/2020  4:19 PM EST -----  Regarding: Dr. Jesus Manuel Prado first and last name: Erin Redmond from [facility]   Reason for call: Requested a call back   Callback required yes/no and why: Yes   Best contact number(s):[phone]  Details to clarify the request: Erin Redmond stated she would like to know if the patient is still taking the Vivitrol single dose shot kit it was last refilled in September and if she is still using this they would like to schedule a Delivery she would like a call back regarding this matter.

## 2020-12-18 NOTE — TELEPHONE ENCOUNTER
Returned call. Earliest Vivitrol delivery date is 12/22/2020 between 8am-10am. Attempted to reach pt to advise as she is scheduled for an appt on 12/21/2020 and needs to be rescheduled until Tues afternoon or later. Pt did not answer, left detailed message on voicemail requesting return call to reschedule appt to Tue afternoon or later.

## 2020-12-29 ENCOUNTER — TELEPHONE (OUTPATIENT)
Dept: INTERNAL MEDICINE CLINIC | Age: 25
End: 2020-12-29

## 2020-12-29 NOTE — TELEPHONE ENCOUNTER
Name: Jazmín Sánchez MRN: [id number]    Date: 12/30/2020 Status: Radha    Time: 10:40 AM Length: 20 [id number]   Visit Type: ROUTINE CARE [[id number]] Copay: $0.00    Provider: Michael Mendes NP Department: [facility]    Referral #:   Referral Status:      Referring Provider:   Patient Type:      Notes: vivitrol injection - okay per NP   PSR called PT, LVM advising PT return call to confirm appointment time okay.  12/29/2020 @ 3:50PM PRINCESS

## 2020-12-29 NOTE — TELEPHONE ENCOUNTER
----- Message from Jay Parekh sent at 12/29/2020  1:36 PM EST -----  Regarding: Dr. Ramses Calles  General Message/Vendor Calls    Caller's first and last name:Siriacony Eng      Reason for call:injection      Callback required yes/no and why:yes      Best contact number(s):[phone]      Details to clarify the request:patient would like to come in sooner than 1/13/2021 for an injection       Jay Cordia

## 2020-12-29 NOTE — TELEPHONE ENCOUNTER
----- Message from Casandra Salines sent at 12/29/2020  1:36 PM EST -----  Regarding: Dr. Eva Oliva  General Message/Vendor Calls    Caller's first and last name:Siria Eng      Reason for call:injection      Callback required yes/no and why:yes      Best contact number(s):[phone]      Details to clarify the request:patient would like to come in sooner than 1/13/2021 for an injection       Casandra Salines

## 2020-12-30 ENCOUNTER — OFFICE VISIT (OUTPATIENT)
Dept: INTERNAL MEDICINE CLINIC | Age: 25
End: 2020-12-30
Payer: COMMERCIAL

## 2020-12-30 VITALS
TEMPERATURE: 98.1 F | HEART RATE: 72 BPM | HEIGHT: 64 IN | WEIGHT: 236.8 LBS | BODY MASS INDEX: 40.43 KG/M2 | OXYGEN SATURATION: 97 % | SYSTOLIC BLOOD PRESSURE: 121 MMHG | DIASTOLIC BLOOD PRESSURE: 87 MMHG | RESPIRATION RATE: 14 BRPM

## 2020-12-30 DIAGNOSIS — F19.11 HISTORY OF SUBSTANCE ABUSE (HCC): ICD-10-CM

## 2020-12-30 DIAGNOSIS — Z79.899 ENCOUNTER FOR MEDICATION MANAGEMENT: Primary | ICD-10-CM

## 2020-12-30 DIAGNOSIS — Z51.81 MEDICATION MONITORING ENCOUNTER: ICD-10-CM

## 2020-12-30 LAB
ALBUMIN SERPL-MCNC: 3.9 G/DL (ref 3.5–5)
ALBUMIN/GLOB SERPL: 1.2 {RATIO} (ref 1.1–2.2)
ALP SERPL-CCNC: 107 U/L (ref 45–117)
ALT SERPL-CCNC: 25 U/L (ref 12–78)
ANION GAP SERPL CALC-SCNC: 3 MMOL/L (ref 5–15)
AST SERPL-CCNC: 25 U/L (ref 15–37)
BILIRUB SERPL-MCNC: 0.5 MG/DL (ref 0.2–1)
BUN SERPL-MCNC: 10 MG/DL (ref 6–20)
BUN/CREAT SERPL: 14 (ref 12–20)
CALCIUM SERPL-MCNC: 9.6 MG/DL (ref 8.5–10.1)
CHLORIDE SERPL-SCNC: 105 MMOL/L (ref 97–108)
CO2 SERPL-SCNC: 29 MMOL/L (ref 21–32)
CREAT SERPL-MCNC: 0.71 MG/DL (ref 0.55–1.02)
GLOBULIN SER CALC-MCNC: 3.3 G/DL (ref 2–4)
GLUCOSE SERPL-MCNC: 99 MG/DL (ref 65–100)
POTASSIUM SERPL-SCNC: 4.4 MMOL/L (ref 3.5–5.1)
PROT SERPL-MCNC: 7.2 G/DL (ref 6.4–8.2)
SODIUM SERPL-SCNC: 137 MMOL/L (ref 136–145)

## 2020-12-30 PROCEDURE — 99213 OFFICE O/P EST LOW 20 MIN: CPT | Performed by: NURSE PRACTITIONER

## 2020-12-30 PROCEDURE — 96372 THER/PROPH/DIAG INJ SC/IM: CPT | Performed by: NURSE PRACTITIONER

## 2020-12-30 NOTE — PROGRESS NOTES
HISTORY OF PRESENT ILLNESS  Miguel Long is a 31 y.o. female. HPI  Presents to office for monthly Vivitrol injection; last injection 11/23/2020. 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 Mood has been stable on current regimen.     Patient Active Problem List   Diagnosis Code    Rotator cuff tear M75.100    Recurrent UTI N39.0    Bee sting allergy Z91.030    Anxiety F41.9    FH: kidney disease Z84.1    Severe obesity ([address] E66.01    Mild depression ([address] F32.0     Past Surgical History:   Procedure Laterality Date    HX ROTATOR CUFF REPAIR Right 05/07/2019    HX TONSILLECTOMY  2002    SHOULDER SURG PROC UNLISTED Right 12/2010    torn labrum and tendon repair/right     Social History     Socioeconomic History    Marital status: SINGLE     Spouse name: Not on file    Number of children: Not on file    Years of education: Not on file    Highest education level: Not on file   Occupational History    Occupation: [employer]   Social Needs    Financial resource strain: Not on file    Food insecurity     Worry: Not on file     Inability: Not on file    Transportation needs     Medical: Not on file     Non-medical: Not on file   Tobacco Use    Smoking status: Never Smoker    Smokeless tobacco: Never Used   Substance and Sexual Activity    Alcohol use: Yes     Frequency: 4 or more times a week     Comment: no ETOH since 12/1/2017    Drug use: Yes     Types: Opiates     Comment: sober since 12/1/2017    Sexual activity: Yes     Birth control/protection: Pill   Lifestyle    Physical activity     Days per week: Not on file     Minutes per session: Not on file    Stress: Not on file   Relationships    Social connections     Talks on phone: Not on file     Gets together: Not on file     Attends Spiritism service: Not on file     Active member of club or organization: Not on file     Attends meetings of clubs or organizations: Not on file     Relationship status: Not on file    Intimate partner violence     Fear of current or ex partner: Not on file     Emotionally abused: Not on file     Physically abused: Not on file     Forced sexual activity: Not on file   Other Topics Concern    Not on file   Social History Narrative    Working full time at [address] [place] [address], 40 hours/week + overtime    Not doing night shifts        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  propranolol (INDERAL) 20 mg tablet Take  by mouth three (3) times daily. As needed    levonorgestrel-ethinyl estradiol (VIENVA) 0.1-20 mg-mcg tab Take 1 Tab by mouth daily.  FLUoxetine (PROZAC) 20 mg tablet Take 4 Tabs by mouth daily. No current facility-administered medications for this visit.       Allergies   Allergen Reactions    Bee Sting Kit Anaphylaxis     And wasps    Cefepime Hives and Swelling    Cephalosporins Anaphylaxis    Ciprofloxacin Anaphylaxis    Quinolones Anaphylaxis    Rocephin [Ceftriaxone] Hives    Zithromax [Azithromycin] Hives     Immunization History   Administered Date(s) Administered    DTaP 1994, 1994, 1994, 10/31/1995, 05/07/1998    HPV 07/16/2008, 09/19/2008, 01/19/2009    Hep A Vaccine 07/16/2008, 01/19/2009    Hep B Vaccine 1994, 1994, 1994    Influenza High Dose Vaccine PF 10/16/2014    Influenza Vaccine 10/30/2016, 10/01/2017, 10/15/2018    Influenza Vaccine Whole 12/03/2012    MMR 08/09/1995, 05/07/1998    Meningococcal (MCV4) Vaccine 05/31/2012    Meningococcal ACWY Vaccine 03/01/2006    Poliovirus vaccine 1994, 1994, 10/31/1995, 05/07/1998    TB Skin Test (PPD) Intradermal 05/21/2013, 06/02/2014    Tdap 03/01/2006, 11/29/2016       ROS    Physical Exam  Vitals signs and nursing note reviewed. Constitutional:       Appearance: Normal appearance. HENT:      Head: Normocephalic and atraumatic. Cardiovascular:      Rate and Rhythm: Normal rate and regular rhythm. Pulmonary:      Effort: Pulmonary effort is normal.      Breath sounds: Normal breath sounds. No wheezing or rhonchi. Musculoskeletal: Normal range of motion. Skin:     General: Skin is warm and dry. Neurological:      General: No focal deficit present. Mental Status: She is alert and oriented to person, place, and time. Psychiatric:         Mood and Affect: Mood normal.         Behavior: Behavior normal.         ASSESSMENT and PLAN  Diagnoses and all orders for this visit:    1. Encounter for medication management  -     naltrexone microspheres (VIVITROL) ER injection 380 mg    2. History of substance abuse ([facility] [address] [facility]  -     naltrexone microspheres (VIVITROL) ER injection 380 mg    3.  Medication monitoring encounter -- given lab order for CMP to check electrolytes  -     METABOLIC PANEL, COMPREHENSIVE      lab results and schedule of future lab studies reviewed with patient  reviewed diet, exercise and weight control  reviewed medications and side effects in detail

## 2020-12-30 NOTE — PROGRESS NOTES
Administered Vivitrol 380mg IM to pts left gluteus. Pt tolerated well. [address]: [id number]. LOT: [id number].  Exp 12/31/2022

## 2021-01-18 ENCOUNTER — OFFICE VISIT (OUTPATIENT)
Dept: INTERNAL MEDICINE CLINIC | Age: 26
End: 2021-01-18
Payer: COMMERCIAL

## 2021-01-18 VITALS
WEIGHT: 238 LBS | HEART RATE: 70 BPM | HEIGHT: 64 IN | RESPIRATION RATE: 16 BRPM | BODY MASS INDEX: 40.63 KG/M2 | TEMPERATURE: 98.2 F | SYSTOLIC BLOOD PRESSURE: 114 MMHG | OXYGEN SATURATION: 96 % | DIASTOLIC BLOOD PRESSURE: 77 MMHG

## 2021-01-18 DIAGNOSIS — F41.9 ANXIETY: ICD-10-CM

## 2021-01-18 DIAGNOSIS — F19.91 HISTORY OF INTRAVENOUS DRUG USE IN REMISSION: Primary | ICD-10-CM

## 2021-01-18 PROCEDURE — 99212 OFFICE O/P EST SF 10 MIN: CPT | Performed by: INTERNAL MEDICINE

## 2021-01-18 PROCEDURE — 96372 THER/PROPH/DIAG INJ SC/IM: CPT | Performed by: INTERNAL MEDICINE

## 2021-01-18 NOTE — PROGRESS NOTES
Diagnoses and all orders for this visit:    1. History of intravenous drug use in remission  Patient has been compliant with the [facility] practitioners monitoring program.  She will hopefully complete the program in 2023. She has been seeing a psychiatrist concurrently every month. She has been following with her counselor every 2 weeks. Overall stable. Labs reviewed  -     naltrexone microspheres (VIVITROL) ER injection 380 mg    2. Anxiety  Patient feels stable on 80 mg of Prozac  She is being followed by psychiatry monthly. Work is stable. She is considering an IUD. She will follow-up with gynecology. Chief Complaint   Patient presents with    Injection     No concerns      Patient is here for her Vivitrol injection and follow-up for any symptoms with medication. History of IV drug use in remission  She reports doing well and no side effects of Vivitrol. She thinks her program will be completed by 2023. She has been stable at work. She has been compliant with the program and has been also at [facility]. She is able to attend these through zoom conferences. Anxiety  No side effects of Prozac  She is being followed by psychiatry, Dr. Isrrael Grande. No changes in her medications. Past Medical History:   Diagnosis Date    Anxiety 2010    Bee sting allergy     severe. Dr. Meet Carlson at [facility]: kidney disease 11/24/2012    Lyme disease     Musculoskeletal disorder 2009    Dislocated Rt shoulder    Recurrent UTI     hx pyelo. saw urology.   took macrodantin age [age] year     Past Surgical History:   Procedure Laterality Date    HX ROTATOR CUFF REPAIR Right 05/07/2019    HX TONSILLECTOMY  2002    MO SHOULDER SURG PROC UNLISTED Right 12/2010    torn labrum and tendon repair/right     Social History     Socioeconomic History    Marital status: SINGLE     Spouse name: Not on file    Number of children: Not on file    Years of education: Not on file    Highest education level: Not on file   Occupational History    Occupation: [employer]   Tobacco Use    Smoking status: Never Smoker    Smokeless tobacco: Never Used   Substance and Sexual Activity    Alcohol use: Yes     Frequency: 4 or more times a week     Comment: no ETOH since 12/1/2017    Drug use: Yes     Types: Opiates     Comment: sober since 12/1/2017    Sexual activity: Yes     Birth control/protection: Pill   Social History Narrative    Working full time at [employer], 40 hours/week + overtime    Not doing night shifts        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  propranolol (INDERAL) 20 mg tablet Take  by mouth three (3) times daily. As needed      FLUoxetine (PROZAC) 20 mg tablet Take 4 Tabs by mouth daily. 180 Tab 0    levonorgestrel-ethinyl estradiol (VIENVA) 0.1-20 mg-mcg tab Take 1 Tab by mouth daily.  Indications: NOT TAKING       Allergies   Allergen Reactions    Bee Sting Kit Anaphylaxis     And wasps    Cefepime Hives and Swelling    Cephalosporins Anaphylaxis    Ciprofloxacin Anaphylaxis    Quinolones Anaphylaxis    Rocephin [Ceftriaxone] Hives    Zithromax [Azithromycin] Hives       Review of Systems - General ROS: negative for - chills or fever  Cardiovascular ROS: no chest pain or dyspnea on exertion  Respiratory ROS: no cough, shortness of breath, or wheezing    Visit Vitals  /77 (BP 1 Location: Left arm, BP Patient Position: Sitting)   Pulse 70   Temp 98.2 °F (36.8 °C) (Oral)   Resp 16   Ht 5' 4\" (1.626 m)   Wt 238 lb (108 kg)   LMP 01/12/2021 (Exact Date)   SpO2 96%   BMI 40.85 kg/m²           Constitutional: [x] Appears well-developed and well-nourished [x] No apparent distress      [] Abnormal -     Mental status: [x] Alert and awake  [x] Oriented to person/place/time [x] Able to follow commands    [] Abnormal -     Eyes:   EOM    [x]  Normal    [] Abnormal -   Sclera  [x]  Normal    [] Abnormal -          Discharge [x]  None visible   [] Abnormal -     HENT: [x] Normocephalic, atraumatic  [] Abnormal -   [x] Mouth/Throat: Mucous membranes are moist    External Ears [x] Normal  [] Abnormal -    Neck: [x] No visualized mass [] Abnormal -     Pulmonary/Chest: [x] Respiratory effort normal   [x] No visualized signs of difficulty breathing or respiratory distress        [] Abnormal -      Musculoskeletal:   [x] Normal gait with no signs of ataxia         [x] Normal range of motion of neck        [] Abnormal -     Neurological:        [x] No Facial Asymmetry (Cranial nerve 7 motor function) (limited exam due to video visit)          [x] No gaze palsy        [] Abnormal -          Skin:        [x] No significant exanthematous lesions or discoloration noted on facial skin         [] Abnormal -            Psychiatric:       [x] Normal Affect [] Abnormal -        [x] No Hallucinations            ATTENTION:   This medical record was transcribed using an electronic medical records/speech recognition system. Although proofread, it may and can contain electronic, spelling and other errors. Corrections may be executed at a later time. Please feel free to contact us for any clarifications as needed.

## 2021-02-19 ENCOUNTER — OFFICE VISIT (OUTPATIENT)
Dept: INTERNAL MEDICINE CLINIC | Age: 26
End: 2021-02-19
Payer: COMMERCIAL

## 2021-02-19 VITALS
OXYGEN SATURATION: 97 % | DIASTOLIC BLOOD PRESSURE: 81 MMHG | TEMPERATURE: 98.4 F | SYSTOLIC BLOOD PRESSURE: 122 MMHG | HEART RATE: 102 BPM | WEIGHT: 231.6 LBS | BODY MASS INDEX: 39.54 KG/M2 | RESPIRATION RATE: 14 BRPM | HEIGHT: 64 IN

## 2021-02-19 DIAGNOSIS — F19.10 SUBSTANCE ABUSE (HCC): Primary | ICD-10-CM

## 2021-02-19 PROCEDURE — 96372 THER/PROPH/DIAG INJ SC/IM: CPT | Performed by: INTERNAL MEDICINE

## 2021-02-19 PROCEDURE — 99213 OFFICE O/P EST LOW 20 MIN: CPT | Performed by: INTERNAL MEDICINE

## 2021-02-19 RX ORDER — ETONOGESTREL 68 MG/1
IMPLANT SUBCUTANEOUS
COMMUNITY

## 2021-02-19 RX ORDER — NALTREXONE 380 MG
KIT INTRAMUSCULAR
Qty: 4 EACH | Refills: 3 | Status: SHIPPED | OUTPATIENT
Start: 2021-02-19 | End: 2021-02-24 | Stop reason: SDUPTHER

## 2021-02-19 RX ORDER — ATOMOXETINE 40 MG/1
80 CAPSULE ORAL DAILY
COMMUNITY

## 2021-02-19 RX ORDER — IBUPROFEN 600 MG/1
TABLET ORAL
COMMUNITY
Start: 2020-11-16 | End: 2021-02-19 | Stop reason: ALTCHOICE

## 2021-02-19 RX ORDER — CYCLOBENZAPRINE HCL 10 MG
TABLET ORAL
COMMUNITY
Start: 2020-11-16 | End: 2021-02-19 | Stop reason: ALTCHOICE

## 2021-02-19 NOTE — PROGRESS NOTES
Diagnoses and all orders for this visit:    1. Substance abuse ([facility] [address]  Patient presents for her monthly Vivitrol injection  She reports she is stable and no side effects  -     naltrexone microspheres (VivitroL) 380 mg ER injection; Injection in clinic per protocol  -     MS THER/PROPH/DIAG INJECTION, SUBCUT/IM  -     naltrexone microspheres (VIVITROL) ER injection 380 mg      Anxiety  Patient notes that Strattera seems to help her anxiety better than BuSpar. Her psychiatrist will start to wean her off of BuSpar. Chief Complaint   Patient presents with    Anxiety    Immunization/Injection     Vivitrol inj. Anxiety  Patient reports that she has been on BuSpar and will be weaning off of this. She has been on Strattera and it is helping with her anxiety so much more. History of drug use  She has been compliant with her treatment plan and seeing psychiatry. She does not have any side effects on Vivitrol    Past Medical History:   Diagnosis Date    Anxiety 2010    Bee sting allergy     severe. Dr. Namrata Bahena [facility]: kidney disease 11/24/2012    Lyme disease     Musculoskeletal disorder 2009    Dislocated Rt shoulder    Recurrent UTI     hx pyelo. saw urology. took macrodantin age [age] year     Past Surgical History:   Procedure Laterality Date    HX ROTATOR CUFF REPAIR Right 05/07/2019    HX TONSILLECTOMY  2002    MS SHOULDER SURG PROC UNLISTED Right 12/2010    torn labrum and tendon repair/right     Social History     Socioeconomic History    Marital status: SINGLE     Spouse name: Not on file    Number of children: Not on file    Years of education: Not on file    Highest education level: Not on file   Occupational History    Occupation: [employer]   Tobacco Use    Smoking status: Never Smoker    Smokeless tobacco: Never Used   Substance and Sexual Activity    Alcohol use: Yes     Frequency: 4 or more times a week     Comment: no ETOH since 12/1/2017    Drug use:  Yes Types: Opiates     Comment: sober since 12/1/2017    Sexual activity: Yes     Birth control/protection: Pill   Social History Narrative    Working full time at [employer],[address], 40 hours/week + overtime    Not doing night shifts        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  atomoxetine (Strattera) 40 mg capsule Take 40 mg by mouth daily.  naltrexone microspheres (VivitroL) 380 mg ER injection Injection in clinic per protocol 4 Each 3    EPINEPHrine (EPIPEN) 0.3 mg/0.3 mL injection       busPIRone (BUSPAR) 10 mg tablet Take 20 mg by mouth three (3) times daily.  propranolol (INDERAL) 20 mg tablet Take  by mouth three (3) times daily. As needed      FLUoxetine (PROZAC) 20 mg tablet Take 4 Tabs by mouth daily. 180 Tab 0    ibuprofen (MOTRIN) 600 mg tablet       cyclobenzaprine (FLEXERIL) 10 mg tablet       levonorgestrel-ethinyl estradiol (VIENVA) 0.1-20 mg-mcg tab Take 1 Tab by mouth daily.  Indications: NOT TAKING       Allergies   Allergen Reactions    Bee Sting Kit Anaphylaxis     And wasps    Cefepime Hives and Swelling    Cephalosporins Anaphylaxis    Ciprofloxacin Anaphylaxis    Quinolones Anaphylaxis    Rocephin [Ceftriaxone] Hives    Zithromax [Azithromycin] Hives       Review of Systems - General ROS: negative  Cardiovascular ROS: no chest pain or dyspnea on exertion  Respiratory ROS: no cough, shortness of breath, or wheezing    Visit Vitals  /81 (BP 1 Location: Left upper arm, BP Patient Position: Sitting, BP Cuff Size: Adult)   Pulse (!) 102   Temp 98.4 °F (36.9 °C) (Oral)   Resp 14   Ht 5' 4\" (1.626 m)   Wt 231 lb 9.6 oz (105.1 kg)   SpO2 97%   BMI 39.75 kg/m²     Constitutional: [x] Appears well-developed and well-nourished [x] No apparent distress      [] Abnormal -     Mental status: [x] Alert and awake  [x] Oriented to person/place/time [x] Able to follow commands    [] Abnormal -     Eyes:   EOM    [x]  Normal    [] Abnormal -   Sclera  [x]  Normal    [] Abnormal -          Discharge [x]  None visible   [] Abnormal -     HENT: [x] Normocephalic, atraumatic  [] Abnormal -   [x] Mouth/Throat: Mucous membranes are moist    External Ears [x] Normal  [] Abnormal -    Neck: [x] No visualized mass [] Abnormal -     Pulmonary/Chest: [x] Respiratory effort normal   [x] No visualized signs of difficulty breathing or respiratory distress        [] Abnormal -      Musculoskeletal:   [x] Normal gait with no signs of ataxia         [x] Normal range of motion of neck        [] Abnormal -     Neurological:        [x] No Facial Asymmetry (Cranial nerve 7 motor function) (limited exam due to video visit)          [x] No gaze palsy        [] Abnormal -          Skin:        [x] No significant exanthematous lesions or discoloration noted on facial skin         [] Abnormal -            Psychiatric:       [x] Normal Affect [] Abnormal -        [x] No Hallucinations      ATTENTION:   This medical record was transcribed using an electronic medical records/speech recognition system. Although proofread, it may and can contain electronic, spelling and other errors. Corrections may be executed at a later time. Please contact us for any clarifications as needed.

## 2021-02-19 NOTE — PROGRESS NOTES
After obtaining consent, and per orders of Dr. Vega Oliveira, injection of Vivitrol 380mg/vial given by Frank Palacios LPN. Patient instructed to remain in clinic for 20 minutes afterwards, and to report any adverse reaction to me immediately.

## 2021-02-24 DIAGNOSIS — F19.10 SUBSTANCE ABUSE (HCC): ICD-10-CM

## 2021-02-24 RX ORDER — NALTREXONE 380 MG
KIT INTRAMUSCULAR
Qty: 4 EACH | Refills: 3 | Status: SHIPPED | OUTPATIENT
Start: 2021-02-24 | End: 2021-08-30

## 2021-02-24 NOTE — TELEPHONE ENCOUNTER
----- Message from Charlene Xiong sent at 2/24/2021  8:49 AM EST -----  Regarding: Dr. Felix Jacobs/Telephone  General Message/Vendor Calls    Caller's first and last name: Bella Rosas, [address]      Reason for call: pharm      Callback required yes/no and why: yes       Best contact number(s): [phone] or Fax [phone]      Details to clarify the request: [facility] pharmacy called regarding vivitrol, needs clarification regarding dosage, route of administration, and frequency       Charlene Xiong

## 2021-02-24 NOTE — TELEPHONE ENCOUNTER
Can you please include dosage, route of administration, and frequency and resend rx to [facility].

## 2021-03-03 ENCOUNTER — TELEPHONE (OUTPATIENT)
Dept: INTERNAL MEDICINE CLINIC | Age: 26
End: 2021-03-03

## 2021-03-03 NOTE — TELEPHONE ENCOUNTER
I spoke with [facility], the needed to confirm the dose of medication. They can only dispense 1/month with refills.

## 2021-03-19 ENCOUNTER — OFFICE VISIT (OUTPATIENT)
Dept: INTERNAL MEDICINE CLINIC | Age: 26
End: 2021-03-19
Payer: COMMERCIAL

## 2021-03-19 VITALS
SYSTOLIC BLOOD PRESSURE: 119 MMHG | DIASTOLIC BLOOD PRESSURE: 86 MMHG | OXYGEN SATURATION: 95 % | WEIGHT: 224 LBS | TEMPERATURE: 98.2 F | HEART RATE: 82 BPM | RESPIRATION RATE: 16 BRPM | BODY MASS INDEX: 38.45 KG/M2

## 2021-03-19 DIAGNOSIS — F41.9 ANXIETY: Primary | ICD-10-CM

## 2021-03-19 DIAGNOSIS — F19.11 HISTORY OF DRUG ABUSE (HCC): ICD-10-CM

## 2021-03-19 PROCEDURE — 99212 OFFICE O/P EST SF 10 MIN: CPT | Performed by: INTERNAL MEDICINE

## 2021-03-19 PROCEDURE — 96372 THER/PROPH/DIAG INJ SC/IM: CPT | Performed by: INTERNAL MEDICINE

## 2021-03-19 RX ORDER — BUSPIRONE HYDROCHLORIDE 10 MG/1
15 TABLET ORAL 3 TIMES DAILY
Qty: 135 TAB | Refills: 4 | Status: SHIPPED | OUTPATIENT
Start: 2021-03-19 | End: 2021-12-27

## 2021-03-19 NOTE — PROGRESS NOTES
Diagnoses and all orders for this visit:    1. Anxiety  Doing well with Strattera and BuSpar has been decreased to 50 mg 3 times daily  -     busPIRone (BUSPAR) 10 mg tablet; Take 1.5 Tabs by mouth three (3) times daily. 2. History of drug abuse (HCC)  Stable  Continue  -     naltrexone microspheres (VIVITROL) ER injection 380 mg    BMI 38  Continue to monitor  Continue Strattera           Chief Complaint   Patient presents with    Injection         History of drug abuse  Patient presents for injection. She has been compliant with protocol. No side effects from withdrawal    Anxiety  Doing significantly better on Strattera and now decreasing BuSpar. She is not having any palpitations. She has had some weight loss related to the Strattera. She is also eating a heart healthy diet for the most part. Past Medical History:   Diagnosis Date    Anxiety 2010    Bee sting allergy     severe. Dr. Radha Justice at [facility]: kidney disease 11/24/2012    Lyme disease     Musculoskeletal disorder 2009    Dislocated Rt shoulder    Recurrent UTI     hx pyelo. saw urology.   took macrodantin age [age] year     Past Surgical History:   Procedure Laterality Date    HX ROTATOR CUFF REPAIR Right 05/07/2019    HX TONSILLECTOMY  2002    MA SHOULDER SURG PROC UNLISTED Right 12/2010    torn labrum and tendon repair/right     Social History     Socioeconomic History    Marital status: SINGLE     Spouse name: Not on file    Number of children: Not on file    Years of education: Not on file    Highest education level: Not on file   Occupational History    Occupation: [employer]   Tobacco Use    Smoking status: Never Smoker    Smokeless tobacco: Never Used   Substance and Sexual Activity    Alcohol use: Yes     Frequency: 4 or more times a week     Comment: no ETOH since 12/1/2017    Drug use: Yes     Types: Opiates     Comment: sober since 12/1/2017    Sexual activity: Yes     Birth control/protection: Pill   Social History Narrative    Working full time at [employer],[address], 40 hours/week + overtime    Not doing night shifts        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 135 Tab 4    naltrexone microspheres (VivitroL) 380 mg ER injection As directed every month 4 Each 3    etonogestreL (Nexplanon) 68 mg impl by SubDERmal route.  atomoxetine (Strattera) 40 mg capsule Take 40 mg by mouth daily.  EPINEPHrine (EPIPEN) 0.3 mg/0.3 mL injection       propranolol (INDERAL) 20 mg tablet Take  by mouth three (3) times daily. As needed      FLUoxetine (PROZAC) 20 mg tablet Take 4 Tabs by mouth daily.  180 Tab 0     Allergies   Allergen Reactions    Bee Sting Kit Anaphylaxis     And wasps    Cefepime Hives and Swelling    Cephalosporins Anaphylaxis    Ciprofloxacin Anaphylaxis    Quinolones Anaphylaxis    Rocephin [Ceftriaxone] Hives    Zithromax [Azithromycin] Hives       Review of Systems - General ROS: negative for - chills or fever  Cardiovascular ROS: no chest pain or dyspnea on exertion  Respiratory ROS: no cough, shortness of breath, or wheezing    Visit Vitals  /86 (BP 1 Location: Left upper arm, BP Patient Position: Sitting, BP Cuff Size: Adult)   Pulse 82   Temp 98.2 °F (36.8 °C) (Oral)   Resp 16   Wt 224 lb (101.6 kg)   SpO2 95%   BMI 38.45 kg/m²     Constitutional: [x] Appears well-developed and well-nourished [x] No apparent distress      [] Abnormal -     Mental status: [x] Alert and awake  [x] Oriented to person/place/time [x] Able to follow commands    [] Abnormal -     Eyes:   EOM    [x]  Normal    [] Abnormal -   Sclera  [x]  Normal    [] Abnormal - Discharge [x]  None visible   [] Abnormal -     HENT: [x] Normocephalic, atraumatic  [] Abnormal -   [x] Mouth/Throat: Mucous membranes are moist    External Ears [x] Normal  [] Abnormal -    Neck: [x] No visualized mass [] Abnormal -     Pulmonary/Chest: [x] Respiratory effort normal   [x] No visualized signs of difficulty breathing or respiratory distress        [] Abnormal -      Musculoskeletal:   [x] Normal gait with no signs of ataxia         [x] Normal range of motion of neck        [] Abnormal -     Neurological:        [x] No Facial Asymmetry (Cranial nerve 7 motor function) (limited exam due to video visit)          [x] No gaze palsy        [] Abnormal -          Skin:        [x] No significant exanthematous lesions or discoloration noted on facial skin         [] Abnormal -            Psychiatric:       [x] Normal Affect [] Abnormal -        [x] No Hallucinations      ATTENTION:   This medical record was transcribed using an electronic medical records/speech recognition system. Although proofread, it may and can contain electronic, spelling and other errors. Corrections may be executed at a later time. Please contact us for any clarifications as needed. On this date 03/19/21  I have spent 10 minutes reviewing previous notes, test results and face to face with the patient discussing the diagnosis and importance of compliance with the treatment plan as well as documenting on the day of the visit.

## 2021-04-16 ENCOUNTER — OFFICE VISIT (OUTPATIENT)
Dept: INTERNAL MEDICINE CLINIC | Age: 26
End: 2021-04-16
Payer: COMMERCIAL

## 2021-04-16 VITALS
RESPIRATION RATE: 16 BRPM | BODY MASS INDEX: 37.56 KG/M2 | SYSTOLIC BLOOD PRESSURE: 123 MMHG | DIASTOLIC BLOOD PRESSURE: 87 MMHG | HEART RATE: 88 BPM | WEIGHT: 220 LBS | HEIGHT: 64 IN | OXYGEN SATURATION: 97 % | TEMPERATURE: 97 F

## 2021-04-16 DIAGNOSIS — F41.9 ANXIETY: ICD-10-CM

## 2021-04-16 DIAGNOSIS — F19.11 HISTORY OF DRUG ABUSE (HCC): Primary | ICD-10-CM

## 2021-04-16 PROCEDURE — 96372 THER/PROPH/DIAG INJ SC/IM: CPT | Performed by: INTERNAL MEDICINE

## 2021-04-16 PROCEDURE — 99212 OFFICE O/P EST SF 10 MIN: CPT | Performed by: INTERNAL MEDICINE

## 2021-04-16 NOTE — PROGRESS NOTES
Diagnoses and all orders for this visit:    1. History of drug abuse ([address]  No issues  -     naltrexone microspheres (VIVITROL) ER injection 380 mg    2. Anxiety  No significant changes. Continue meds and Strattera      BMI 37  Continues with weight loss. Continue to monitor         Chief Complaint   Patient presents with    Injection     Patient presents for her Vivitrol injection. She reports she is still working at her current job. She has not had any illicit or prescription drug use. She is stable. Anxiety  No change in meds. She has been on Strattera and no change in meds. Her heart rate is stable. Labs reviewed with patient. BMI 37  She has been eating a heart healthy diet. Past Medical History:   Diagnosis Date    Anxiety 2010    Bee sting allergy     severe. Dr. Amanda Dunn at [facility]: kidney disease 11/24/2012    Lyme disease     Musculoskeletal disorder 2009    Dislocated Rt shoulder    Recurrent UTI     hx pyelo. saw urology.   took macrodantin age 16 3 year     Past Surgical History:   Procedure Laterality Date    HX ROTATOR CUFF REPAIR Right 05/07/2019    HX TONSILLECTOMY  2002    DE SHOULDER SURG PROC UNLISTED Right 12/2010    torn labrum and tendon repair/right     Social History     Socioeconomic History    Marital status: SINGLE     Spouse name: Not on file    Number of children: Not on file    Years of education: Not on file    Highest education level: Not on file   Occupational History    Occupation: [employer]   Tobacco Use    Smoking status: Never Smoker    Smokeless tobacco: Never Used   Substance and Sexual Activity    Alcohol use: Yes     Frequency: 4 or more times a week     Comment: no ETOH since 12/1/2017    Drug use: Yes     Types: Opiates     Comment: sober since 12/1/2017    Sexual activity: Yes     Birth control/protection: Pill   Social History Narrative    Working full time at [employer],[address], 40 hours/week + overtime    Not doing night shifts        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 135 Tab 4    naltrexone microspheres (VivitroL) 380 mg ER injection As directed every month 4 Each 3    etonogestreL (Nexplanon) 68 mg impl by SubDERmal route.  atomoxetine (Strattera) 40 mg capsule Take 40 mg by mouth daily.  EPINEPHrine (EPIPEN) 0.3 mg/0.3 mL injection       propranolol (INDERAL) 20 mg tablet Take  by mouth three (3) times daily. As needed      FLUoxetine (PROZAC) 20 mg tablet Take 4 Tabs by mouth daily.  180 Tab 0     Allergies   Allergen Reactions    Bee Sting Kit Anaphylaxis     And wasps    Cefepime Hives and Swelling    Cephalosporins Anaphylaxis    Ciprofloxacin Anaphylaxis    Quinolones Anaphylaxis    Rocephin [Ceftriaxone] Hives    Zithromax [Azithromycin] Hives       Review of Systems - General ROS: negative for - chills or fever  Cardiovascular ROS: no chest pain or dyspnea on exertion  Respiratory ROS: no cough, shortness of breath, or wheezing    Visit Vitals  /87 (BP 1 Location: Left upper arm, BP Patient Position: Sitting, BP Cuff Size: Adult)   Pulse 88   Temp 97 °F (36.1 °C) (Oral)   Resp 16   Ht 5' 4\" (1.626 m)   Wt 220 lb (99.8 kg)   SpO2 97%   BMI 37.76 kg/m²     Constitutional: [x] Appears well-developed and well-nourished [x] No apparent distress      [] Abnormal -     Mental status: [x] Alert and awake  [x] Oriented to person/place/time [x] Able to follow commands    [] Abnormal -     Eyes:   EOM    [x]  Normal    [] Abnormal -   Sclera  [x]  Normal    [] Abnormal -          Discharge [x]  None visible   [] Abnormal -     HENT: [x] Normocephalic, atraumatic  [] Abnormal -   [x] Mouth/Throat: Mucous membranes are moist    External Ears [x] Normal  [] Abnormal -    Neck: [x] No visualized mass [] Abnormal -     Pulmonary/Chest: [x] Respiratory effort normal   [x] No visualized signs of difficulty breathing or respiratory distress        [] Abnormal -      Musculoskeletal:   [x] Normal gait with no signs of ataxia         [x] Normal range of motion of neck        [] Abnormal -     Neurological:        [x] No Facial Asymmetry (Cranial nerve 7 motor function) (limited exam due to video visit)          [x] No gaze palsy        [] Abnormal -          Skin:        [x] No significant exanthematous lesions or discoloration noted on facial skin         [] Abnormal -            Psychiatric:       [x] Normal Affect [] Abnormal -        [x] No Hallucinations      ATTENTION:   This medical record was transcribed using an electronic medical records/speech recognition system. Although proofread, it may and can contain electronic, spelling and other errors. Corrections may be executed at a later time. Please contact us for any clarifications as needed.

## 2021-05-27 ENCOUNTER — OFFICE VISIT (OUTPATIENT)
Dept: INTERNAL MEDICINE CLINIC | Age: 26
End: 2021-05-27
Payer: COMMERCIAL

## 2021-05-27 VITALS
BODY MASS INDEX: 37.11 KG/M2 | WEIGHT: 217.4 LBS | HEART RATE: 73 BPM | HEIGHT: 64 IN | RESPIRATION RATE: 14 BRPM | SYSTOLIC BLOOD PRESSURE: 112 MMHG | DIASTOLIC BLOOD PRESSURE: 78 MMHG | OXYGEN SATURATION: 98 % | TEMPERATURE: 98.1 F

## 2021-05-27 DIAGNOSIS — R53.83 FATIGUE, UNSPECIFIED TYPE: ICD-10-CM

## 2021-05-27 DIAGNOSIS — R73.09 ELEVATED GLUCOSE: ICD-10-CM

## 2021-05-27 DIAGNOSIS — F19.91 HISTORY OF DRUG USE: ICD-10-CM

## 2021-05-27 DIAGNOSIS — E78.2 MIXED HYPERLIPIDEMIA: Primary | ICD-10-CM

## 2021-05-27 LAB
ALBUMIN SERPL-MCNC: 3.9 G/DL (ref 3.5–5)
ALBUMIN/GLOB SERPL: 1.3 {RATIO} (ref 1.1–2.2)
ALP SERPL-CCNC: 97 U/L (ref 45–117)
ALT SERPL-CCNC: 20 U/L (ref 12–78)
ANION GAP SERPL CALC-SCNC: 12 MMOL/L (ref 5–15)
AST SERPL-CCNC: 12 U/L (ref 15–37)
BILIRUB SERPL-MCNC: 0.6 MG/DL (ref 0.2–1)
BUN SERPL-MCNC: 13 MG/DL (ref 6–20)
BUN/CREAT SERPL: 20 (ref 12–20)
CALCIUM SERPL-MCNC: 9.2 MG/DL (ref 8.5–10.1)
CHLORIDE SERPL-SCNC: 106 MMOL/L (ref 97–108)
CHOLEST SERPL-MCNC: 235 MG/DL
CO2 SERPL-SCNC: 20 MMOL/L (ref 21–32)
CREAT SERPL-MCNC: 0.64 MG/DL (ref 0.55–1.02)
ERYTHROCYTE [DISTWIDTH] IN BLOOD BY AUTOMATED COUNT: 12.5 % (ref 11.5–14.5)
EST. AVERAGE GLUCOSE BLD GHB EST-MCNC: 108 MG/DL
GLOBULIN SER CALC-MCNC: 3 G/DL (ref 2–4)
GLUCOSE SERPL-MCNC: 107 MG/DL (ref 65–100)
HBA1C MFR BLD: 5.4 % (ref 4–5.6)
HCT VFR BLD AUTO: 42.3 % (ref 35–47)
HDLC SERPL-MCNC: 48 MG/DL
HDLC SERPL: 4.9 {RATIO} (ref 0–5)
HGB BLD-MCNC: 13.4 G/DL (ref 11.5–16)
LDLC SERPL CALC-MCNC: 163.6 MG/DL (ref 0–100)
MCH RBC QN AUTO: 28.3 PG (ref 26–34)
MCHC RBC AUTO-ENTMCNC: 31.7 G/DL (ref 30–36.5)
MCV RBC AUTO: 89.4 FL (ref 80–99)
NRBC # BLD: 0 K/UL (ref 0–0.01)
NRBC BLD-RTO: 0 PER 100 WBC
PLATELET # BLD AUTO: 368 K/UL (ref 150–400)
PMV BLD AUTO: 10 FL (ref 8.9–12.9)
POTASSIUM SERPL-SCNC: 4.5 MMOL/L (ref 3.5–5.1)
PROT SERPL-MCNC: 6.9 G/DL (ref 6.4–8.2)
RBC # BLD AUTO: 4.73 M/UL (ref 3.8–5.2)
SODIUM SERPL-SCNC: 138 MMOL/L (ref 136–145)
TRIGL SERPL-MCNC: 117 MG/DL (ref ?–150)
TSH SERPL DL<=0.05 MIU/L-ACNC: 1.24 UIU/ML (ref 0.36–3.74)
VLDLC SERPL CALC-MCNC: 23.4 MG/DL
WBC # BLD AUTO: 6.7 K/UL (ref 3.6–11)

## 2021-05-27 PROCEDURE — 99213 OFFICE O/P EST LOW 20 MIN: CPT | Performed by: INTERNAL MEDICINE

## 2021-05-27 NOTE — PROGRESS NOTES
Diagnoses and all orders for this visit:    1. Mixed hyperlipidemia  Patient has been on a heart healthy keto type diet    -     LIPID PANEL; Future  -     METABOLIC PANEL, COMPREHENSIVE; Future    2. Elevated glucose  Continue labs  -     HEMOGLOBIN A1C WITH EAG; Future    3. 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 She aspires to be going to nurse practitioner psychiatry. She does not have to do nights anymore with this new position. History of drug use  She presents for her Vivitrol injection. She continues to be drug-free. Hyperlipidemia  She reports eating heart healthy diet and exercise. No chest pain or shortness of breath. Past Medical History:   Diagnosis Date    Anxiety 2010    Bee sting allergy     severe. Dr. Esthela John at [facility]: kidney disease 11/24/2012    Lyme disease     Musculoskeletal disorder 2009    Dislocated Rt shoulder    Recurrent UTI     hx pyelo. saw urology.   took macrodantin age 16 3 year     Past Surgical History:   Procedure Laterality Date    HX ROTATOR CUFF REPAIR Right 05/07/2019    HX TONSILLECTOMY  2002    CT SHOULDER SURG PROC UNLISTED Right 12/2010    torn labrum and tendon repair/right     Social History     Socioeconomic History    Marital status: SINGLE     Spouse name: Not on file    Number of children: Not on file    Years of education: Not on file    Highest education level: Not on file   Occupational History    Occupation: [employer]   Tobacco Use    Smoking status: Never Smoker    Smokeless tobacco: Never Used   Substance and Sexual Activity    Alcohol use: Yes     Comment: no ETOH since 12/1/2017  Drug use: Yes     Types: Opiates     Comment: sober since 12/1/2017    Sexual activity: Yes     Birth control/protection: Pill   Social History Narrative    Working full time at [address], 40 hours/week + overtime    Not doing night shifts        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 Strain:     Difficulty of Paying Living Expenses:    Food Insecurity:     Worried About [address] in the Last Year:     [address] in the Last Year:    Transportation Needs:     Lack of Transportation (Medical):  Lack of Transportation (Non-Medical):    Physical Activity:     Days of Exercise per Week:     Minutes of Exercise per Session:    Stress:     Feeling of Stress :    Social Connections:     Frequency of Communication with Friends and Family:     Frequency of Social Gatherings with Friends and Family:     Attends Faith Services:     Active Member of Clubs or Organizations:     Attends Club or Organization Meetings:     Marital Status:      Family History   Problem Relation Age of Onset    Anxiety Mother     Kidney Disease Paternal Grandmother         also in cousins    Cancer Maternal Grandmother         skin     Current Outpatient Medications   Medication Sig Dispense Refill    busPIRone (BUSPAR) 10 mg tablet Take 1.5 Tabs by mouth three (3) times daily. 135 Tab 4    naltrexone microspheres (VivitroL) 380 mg ER injection As directed every month 4 Each 3    etonogestreL (Nexplanon) 68 mg impl by SubDERmal route.  atomoxetine (Strattera) 40 mg capsule Take 40 mg by mouth daily.  EPINEPHrine (EPIPEN) 0.3 mg/0.3 mL injection       propranolol (INDERAL) 20 mg tablet Take  by mouth three (3) times daily. As needed      FLUoxetine (PROZAC) 20 mg tablet Take 4 Tabs by mouth daily.  180 Tab 0     Allergies   Allergen Reactions    Bee Sting Kit Anaphylaxis     And wasps    Cefepime Hives and Swelling    Cephalosporins Anaphylaxis    Ciprofloxacin Anaphylaxis    Quinolones Anaphylaxis    Rocephin [Ceftriaxone] Hives    Zithromax [Azithromycin] Hives       Review of Systems - General ROS: negative for - chills or fever  Cardiovascular ROS: no chest pain or dyspnea on exertion  Respiratory ROS: no cough, shortness of breath, or wheezing    Visit Vitals  /78 (BP 1 Location: Left upper arm, BP Patient Position: Sitting, BP Cuff Size: Adult)   Pulse 73   Temp 98.1 °F (36.7 °C) (Temporal)   Resp 14   Ht 5' 4\" (1.626 m)   Wt 217 lb 6.4 oz (98.6 kg)   LMP 05/15/2021 (Approximate)   SpO2 98%   BMI 37.32 kg/m²     Constitutional: [x] Appears well-developed and well-nourished [x] No apparent distress      [] Abnormal -     Mental status: [x] Alert and awake  [x] Oriented to person/place/time [x] Able to follow commands    [] Abnormal -     Eyes:   EOM    [x]  Normal    [] Abnormal -   Sclera  [x]  Normal    [] Abnormal -          Discharge [x]  None visible   [] Abnormal -     HENT: [x] Normocephalic, atraumatic  [] Abnormal -   [x] Mouth/Throat: Mucous membranes are moist    External Ears [x] Normal  [] Abnormal -    Neck: [x] No visualized mass [] Abnormal -     Pulmonary/Chest: [x] Respiratory effort normal   [x] No visualized signs of difficulty breathing or respiratory distress        [] Abnormal -      Musculoskeletal:   [x] Normal gait with no signs of ataxia         [x] Normal range of motion of neck        [] Abnormal -     Neurological:        [x] No Facial Asymmetry (Cranial nerve 7 motor function) (limited exam due to video visit)          [x] No gaze palsy        [] Abnormal -          Skin:        [x] No significant exanthematous lesions or discoloration noted on facial skin         [] Abnormal -            Psychiatric:       [x] Normal Affect [] Abnormal -        [x] No Hallucinations      ATTENTION:   This medical record was transcribed using an electronic medical records/speech recognition system. Although proofread, it may and can contain electronic, spelling and other errors. Corrections may be executed at a later time. Please contact us for any clarifications as needed. Amparo Millan

## 2021-06-29 ENCOUNTER — OFFICE VISIT (OUTPATIENT)
Dept: INTERNAL MEDICINE CLINIC | Age: 26
End: 2021-06-29

## 2021-06-29 ENCOUNTER — TELEPHONE (OUTPATIENT)
Dept: INTERNAL MEDICINE CLINIC | Age: 26
End: 2021-06-29

## 2021-06-29 VITALS
RESPIRATION RATE: 14 BRPM | BODY MASS INDEX: 35.92 KG/M2 | HEIGHT: 64 IN | HEART RATE: 80 BPM | WEIGHT: 210.4 LBS | SYSTOLIC BLOOD PRESSURE: 123 MMHG | TEMPERATURE: 98.4 F | DIASTOLIC BLOOD PRESSURE: 81 MMHG | OXYGEN SATURATION: 98 %

## 2021-06-29 NOTE — TELEPHONE ENCOUNTER
Nurse notified that patient has presented to office for monthly Vivitrol injection, but medication is not at the office. Nurse called patient's [facility] & additional dose was never requested for delivery when last dose was administered last month. Patient states that pharmacy will automatically send it, but according to [facility] pharmacist a call must be placed requesting the medication. Nurse requested stat delivery of Vivitrol dose & according to Beth Reyes at [place] [address], patient's Vivitrol dose will be delivered to this office this Thursday morning 7/1/2021. Patient notified & apology issued. Patient was very understanding; patient will return to the practice this Friday 7/2/21 at 10:40am for Vivitrol injection. Patient verbalized agreement of new appointment date & time. Nurse will send a MyChart confirmation of new appt date & time as well.

## 2021-06-29 NOTE — PROGRESS NOTES
Supply of Vivitriol not sent from pharmacy; visit voided and patient to return once we receive supply

## 2021-07-02 ENCOUNTER — OFFICE VISIT (OUTPATIENT)
Dept: INTERNAL MEDICINE CLINIC | Age: 26
End: 2021-07-02
Payer: COMMERCIAL

## 2021-07-02 ENCOUNTER — TELEPHONE (OUTPATIENT)
Dept: INTERNAL MEDICINE CLINIC | Age: 26
End: 2021-07-02

## 2021-07-02 VITALS
RESPIRATION RATE: 14 BRPM | WEIGHT: 213 LBS | BODY MASS INDEX: 36.37 KG/M2 | OXYGEN SATURATION: 98 % | DIASTOLIC BLOOD PRESSURE: 71 MMHG | HEIGHT: 64 IN | HEART RATE: 108 BPM | TEMPERATURE: 97.6 F | SYSTOLIC BLOOD PRESSURE: 110 MMHG

## 2021-07-02 DIAGNOSIS — F19.91 HISTORY OF DRUG USE: Primary | ICD-10-CM

## 2021-07-02 PROCEDURE — 99213 OFFICE O/P EST LOW 20 MIN: CPT | Performed by: NURSE PRACTITIONER

## 2021-07-02 PROCEDURE — 96372 THER/PROPH/DIAG INJ SC/IM: CPT | Performed by: NURSE PRACTITIONER

## 2021-07-02 NOTE — PROGRESS NOTES
Lakshmi Miller (: 1995) is a 31 y.o. female, established patient, here for evaluation of the following chief complaint(s):  No chief complaint on file. ASSESSMENT/PLAN:  Below is the assessment and plan developed based on review of pertinent history, physical exam, labs, studies, and medications. 1. History of drug use  -     naltrexone microspheres (VIVITROL) ER injection 380 mg; 380 mg, IntraMUSCular, ONCE, 1 dose, On 21 at 1500      Follow up in 1 month. Pharmacy was contacted to send additional vials of medication for her next 2 doses. SUBJECTIVE/OBJECTIVE:  HPI    Presents for Vivitrol ER injection. 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 She is stable.     Patient Active Problem List   Diagnosis Code    Rotator cuff tear M75.100    Recurrent UTI N39.0    Bee sting allergy Z91.030    Anxiety F41.9    FH: kidney disease Z84.1    Severe obesity ([address] E66.01    Mild depression ([address] F32.0     Past Surgical History:   Procedure Laterality Date    HX ROTATOR CUFF REPAIR Right 2019    HX TONSILLECTOMY  2002    [place] SHOULDER SURG PROC UNLISTED Right 2010    torn labrum and tendon repair/right     Social History     Socioeconomic History    Marital status: SINGLE     Spouse name: Not on file    Number of children: Not on file    Years of education: Not on file    Highest education level: Not on file   Occupational History    Occupation: [employer]   Tobacco Use    Smoking status: Never Smoker    Smokeless tobacco: Never Used   Vaping Use    Vaping Use: Never used   Substance and Sexual Activity    Alcohol use: Yes     Comment: no ETOH since 2017    Drug use: Yes     Types: Opiates     Comment: sober since 2017    Sexual activity: Yes     Birth control/protection: Pill   Other Topics Concern    Not on file   Social History Narrative    Working full time at [address] [place] [address],  hours/week + overtime    Not doing night shifts        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 [employer] 44 Strain:     Difficulty of Paying Living Expenses:    Food Insecurity:     Worried About Running Out of Food in the Last Year:     [address] in the Last Year:    Transportation Needs:     Lack of Transportation (Medical):  Lack of Transportation (Non-Medical):    Physical Activity:     Days of Exercise per Week:     Minutes of Exercise per Session:    Stress:     Feeling of Stress :    Social Connections:     Frequency of Communication with Friends and Family:     Frequency of Social Gatherings with Friends and Family:     Attends Holiness Services:     Active Member of Clubs or Organizations:     Attends Club or Organization Meetings:     Marital Status:    Intimate Partner Violence:     Fear of Current or Ex-Partner:     Emotionally Abused:     Physically Abused:     Sexually Abused:      Family History   Problem Relation Age of Onset    Anxiety Mother     Kidney Disease Paternal Grandmother         also in cousins    Cancer Maternal Grandmother         skin     Current Outpatient Medications   Medication Sig    busPIRone (BUSPAR) 10 mg tablet Take 1.5 Tabs by mouth three (3) times daily.  naltrexone microspheres (VivitroL) 380 mg ER injection As directed every month    etonogestreL (Nexplanon) 68 mg impl by SubDERmal route.  atomoxetine (Strattera) 40 mg capsule Take 40 mg by mouth daily.  EPINEPHrine (EPIPEN) 0.3 mg/0.3 mL injection     propranolol (INDERAL) 20 mg tablet Take  by mouth three (3) times daily. As needed    FLUoxetine (PROZAC) 20 mg tablet Take 4 Tabs by mouth daily. No current facility-administered medications for this visit.      Allergies   Allergen Reactions    Bee Sting Kit Anaphylaxis     And wasps    Cefepime Hives and Swelling    Cephalosporins Anaphylaxis    Ciprofloxacin Anaphylaxis  Quinolones Anaphylaxis    Rocephin [Ceftriaxone] Hives    Zithromax [Azithromycin] Hives     Immunization History   Administered Date(s) Administered    COVID-19, PFIZER, MRNA, LNP-S, PF, 30MCG/0.3ML DOSE 02/02/2021, 02/24/2021    DTaP 1995, 1995, 1994, 10/31/1995, 05/07/1998    HPV 07/16/2008, 09/19/2008, 01/19/2009    Hep A Vaccine 07/16/2008, 01/19/2009    Hep B Vaccine 1995, 1995, 1995    Influenza High Dose Vaccine PF 10/16/2014    Influenza Vaccine 10/30/2016, 10/01/2017, 10/15/2018    Influenza Vaccine Whole 12/03/2012    MMR 08/09/1995, 05/07/1998    Meningococcal (MCV4) Vaccine 05/31/2012    Meningococcal ACWY Vaccine 03/01/2006    Poliovirus vaccine 1994, 1994, 10/31/1995, 05/07/1998    TB Skin Test (PPD) Intradermal 05/21/2013, 06/02/2014    Tdap 03/01/2006, 11/29/2016         Review of Systems   Constitutional: Negative for chills and fever. Respiratory: Negative for cough and shortness of breath. Cardiovascular: Negative for chest pain. Psychiatric/Behavioral: Negative for dysphoric mood. The patient is not nervous/anxious. /71 (BP 1 Location: Left upper arm, BP Patient Position: Sitting, BP Cuff Size: Adult)   Pulse (!) 108   Temp 97.6 °F (36.4 °C) (Temporal)   Resp 14   Ht 5' 4\" (1.626 m)   Wt 213 lb (96.6 kg)   SpO2 98%   BMI 36.56 kg/m²   Physical Exam  Vitals and nursing note reviewed. Constitutional:       Appearance: Normal appearance. HENT:      Head: Normocephalic. Pulmonary:      Effort: Pulmonary effort is normal.   Neurological:      General: No focal deficit present. Mental Status: She is alert and oriented to person, place, and time. Psychiatric:         Mood and Affect: Mood normal.         Behavior: Behavior normal.               An electronic signature was used to authenticate this note.   -- Issac Tellez NP

## 2021-07-02 NOTE — TELEPHONE ENCOUNTER
I called the [address] [place] [address] at [phone] and spoke with a  to advise pt was schedule for her injection at 1040am, medication was removed from the refrigerator at 11am and reconstituted by a nurse. The medication was not removed the vial, its still in the vial that was provided. The medication is now back in the refrigerator. Representative states medication can be removed from the refrigerator and stored at room temperature for 3 cycles but only for a total of 7 days. Therefore we have 6 days and 40mins to administer pts medication to her. If we remove the medication again from the refrigerator again it will be the 2nd cycle and the time will not reset.

## 2021-07-21 ENCOUNTER — TELEPHONE (OUTPATIENT)
Dept: INTERNAL MEDICINE CLINIC | Age: 26
End: 2021-07-21

## 2021-07-21 NOTE — TELEPHONE ENCOUNTER
Patient was calling to cancel her appointment because she had to work - when trying to reschedule [I offered appointment in August which is [facility] next opening] she told me not to reschedule and to reach out to nurse and provider to see if she could just squeeze her in sometime because this \"is just for an injection. \"   She is available the 23rd, 26th & 29th - please call patient back and advise

## 2021-07-21 NOTE — TELEPHONE ENCOUNTER
I spoke with patient to advise we still haven't received the injection, pt will call them today to have them ship it out. Appt schedule for next Thursday at 1140am with pcp. Pt was thankful.

## 2021-07-29 ENCOUNTER — OFFICE VISIT (OUTPATIENT)
Dept: INTERNAL MEDICINE CLINIC | Age: 26
End: 2021-07-29
Payer: COMMERCIAL

## 2021-07-29 VITALS
RESPIRATION RATE: 12 BRPM | OXYGEN SATURATION: 98 % | BODY MASS INDEX: 35.55 KG/M2 | DIASTOLIC BLOOD PRESSURE: 82 MMHG | HEIGHT: 64 IN | SYSTOLIC BLOOD PRESSURE: 115 MMHG | HEART RATE: 103 BPM | WEIGHT: 208.2 LBS | TEMPERATURE: 98.1 F

## 2021-07-29 DIAGNOSIS — E78.49 OTHER HYPERLIPIDEMIA: Primary | ICD-10-CM

## 2021-07-29 DIAGNOSIS — F19.11 HISTORY OF DRUG ABUSE IN REMISSION (HCC): ICD-10-CM

## 2021-07-29 PROCEDURE — 99212 OFFICE O/P EST SF 10 MIN: CPT | Performed by: INTERNAL MEDICINE

## 2021-07-29 PROCEDURE — 96372 THER/PROPH/DIAG INJ SC/IM: CPT | Performed by: INTERNAL MEDICINE

## 2021-07-29 NOTE — PROGRESS NOTES
Diagnoses and all orders for this visit:    1. Other hyperlipidemia  Intentional weight loss of 50 pounds  Continue heart healthy diet and exercise as tolerated  We will recheck her cholesterol in November 1208  -     METABOLIC PANEL, COMPREHENSIVE; Future  -     LIPID PANEL; Future    2. History of drug abuse in remission [facility])  Doing very well  Continue Vivitrol  -     naltrexone microspheres (VIVITROL) ER injection 380 mg           Chief Complaint   Patient presents with    Injection     History of drug abuse  Patient is doing very well. She has intentionally lost 50 pounds. 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 Hyperlipidemia  Discussed with patient and she is eating a heart healthy diet and exercising. She has intentionally lost 50 pounds. Past Medical History:   Diagnosis Date    Anxiety 2010    Bee sting allergy     severe. Dr. Ciera Watson at [facility]: kidney disease 11/24/2012    Lyme disease     Musculoskeletal disorder 2009    Dislocated Rt shoulder    Recurrent UTI     hx pyelo. saw urology.   took macrodantin age 16 3 year     Past Surgical History:   Procedure Laterality Date    HX ROTATOR CUFF REPAIR Right 05/07/2019    HX TONSILLECTOMY  2002    MD SHOULDER SURG PROC UNLISTED Right 12/2010    torn labrum and tendon repair/right     Social History     Socioeconomic History    Marital status: SINGLE     Spouse name: Not on file    Number of children: Not on file    Years of education: Not on file    Highest education level: Not on file   Occupational History    Occupation: [employer]   Tobacco Use    Smoking status: Never Smoker    Smokeless tobacco: Never Used   Vaping Use    Vaping Use: Never used   Substance and Sexual Activity    Alcohol use: Yes     Comment: no ETOH since 12/1/2017    Drug use: Yes     Types: Opiates     Comment: sober since 12/1/2017    Sexual activity: Yes     Birth control/protection: Pill   Social History Narrative    Working full time at [employer]. 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 [employer] 44 Strain:     Difficulty of Paying Living Expenses:    Food Insecurity:     Worried About Running Out of Food in the Last Year:     [address] in the Last Year:    Transportation Needs:     Lack of Transportation (Medical):  Lack of Transportation (Non-Medical):    Physical Activity:     Days of Exercise per Week:     Minutes of Exercise per Session:    Stress:     Feeling of Stress :    Social Connections:     Frequency of Communication with Friends and Family:     Frequency of Social Gatherings with Friends and Family:     Attends Sikh Services:     Active Member of Clubs or Organizations:     Attends Club or Organization Meetings:     Marital Status:      Family History   Problem Relation Age of Onset    Anxiety Mother     Kidney Disease Paternal Grandmother         also in cousins    Cancer Maternal Grandmother         skin     Current Outpatient Medications   Medication Sig Dispense Refill    busPIRone (BUSPAR) 10 mg tablet Take 1.5 Tabs by mouth three (3) times daily. 135 Tab 4    naltrexone microspheres (VivitroL) 380 mg ER injection As directed every month 4 Each 3    etonogestreL (Nexplanon) 68 mg impl by SubDERmal route.  atomoxetine (Strattera) 40 mg capsule Take 80 mg by mouth daily.  EPINEPHrine (EPIPEN) 0.3 mg/0.3 mL injection       propranolol (INDERAL) 20 mg tablet Take  by mouth as needed. As needed       FLUoxetine (PROZAC) 20 mg tablet Take 4 Tabs by mouth daily.  180 Tab 0     Allergies   Allergen Reactions    Bee Sting Kit Anaphylaxis     And wasps    Cefepime Hives and Swelling    Cephalosporins Anaphylaxis    Ciprofloxacin Anaphylaxis    Quinolones Anaphylaxis    Rocephin [Ceftriaxone] Hives    Zithromax [Azithromycin] Hives       Review of Systems - General ROS: negative for - chills or fever  Cardiovascular ROS: no chest pain or dyspnea on exertion  Respiratory ROS: no cough, shortness of breath, or wheezing    Visit Vitals  /82 (BP 1 Location: Right arm, BP Patient Position: Sitting)   Pulse (!) 103   Temp 98.1 °F (36.7 °C) (Oral)   Resp 12   Ht 5' 4\" (1.626 m)   Wt 208 lb 3.2 oz (94.4 kg)   SpO2 98%   BMI 35.74 kg/m²     Constitutional: [x] Appears well-developed and well-nourished [x] No apparent distress      [] Abnormal -     Mental status: [x] Alert and awake  [x] Oriented to person/place/time [x] Able to follow commands    [] Abnormal -     Eyes:   EOM    [x]  Normal    [] Abnormal -   Sclera  [x]  Normal    [] Abnormal -          Discharge [x]  None visible   [] Abnormal -     HENT: [x] Normocephalic, atraumatic  [] Abnormal -   [x] Mouth/Throat: Mucous membranes are moist    External Ears [x] Normal  [] Abnormal -    Neck: [x] No visualized mass [] Abnormal -     Pulmonary/Chest: [x] Respiratory effort normal   [x] No visualized signs of difficulty breathing or respiratory distress        [] Abnormal -      Musculoskeletal:   [x] Normal gait with no signs of ataxia         [x] Normal range of motion of neck        [] Abnormal -     Neurological:        [x] No Facial Asymmetry (Cranial nerve 7 motor function) (limited exam due to video visit)          [x] No gaze palsy        [] Abnormal -          Skin:        [x] No significant exanthematous lesions or discoloration noted on facial skin         [] Abnormal -            Psychiatric:       [x] Normal Affect [] Abnormal -        [x] No Hallucinations      ATTENTION:   This medical record was transcribed using an electronic medical records/speech recognition system. Although proofread, it may and can contain electronic, spelling and other errors. Corrections may be executed at a later time. Please contact us for any clarifications as needed.

## 2021-07-29 NOTE — PROGRESS NOTES
After obtaining consent, and per orders of Dr. Taylor Porter injection of Vivitrol 380mg/vial given by Brand Nageotte, LPN. Patient instructed to remain in clinic for 20 minutes afterwards, and to report any adverse reaction to me immediately.

## 2021-08-30 ENCOUNTER — OFFICE VISIT (OUTPATIENT)
Dept: INTERNAL MEDICINE CLINIC | Age: 26
End: 2021-08-30
Payer: COMMERCIAL

## 2021-08-30 VITALS
DIASTOLIC BLOOD PRESSURE: 79 MMHG | WEIGHT: 203 LBS | HEIGHT: 64 IN | BODY MASS INDEX: 34.66 KG/M2 | SYSTOLIC BLOOD PRESSURE: 112 MMHG | OXYGEN SATURATION: 97 % | RESPIRATION RATE: 14 BRPM | HEART RATE: 112 BPM | TEMPERATURE: 98.6 F

## 2021-08-30 DIAGNOSIS — R73.09 ELEVATED GLUCOSE: ICD-10-CM

## 2021-08-30 DIAGNOSIS — Z11.59 NEED FOR HEPATITIS C SCREENING TEST: ICD-10-CM

## 2021-08-30 DIAGNOSIS — Z00.00 WELL ADULT EXAM: Primary | ICD-10-CM

## 2021-08-30 DIAGNOSIS — E78.2 MIXED HYPERLIPIDEMIA: ICD-10-CM

## 2021-08-30 DIAGNOSIS — R00.0 TACHYCARDIA: ICD-10-CM

## 2021-08-30 PROCEDURE — 99395 PREV VISIT EST AGE 18-39: CPT | Performed by: INTERNAL MEDICINE

## 2021-08-30 PROCEDURE — 99213 OFFICE O/P EST LOW 20 MIN: CPT | Performed by: INTERNAL MEDICINE

## 2021-08-30 PROCEDURE — 96372 THER/PROPH/DIAG INJ SC/IM: CPT | Performed by: INTERNAL MEDICINE

## 2021-08-30 NOTE — PROGRESS NOTES
Diagnoses and all orders for this visit:    1. Well adult exam  Well adult exam completed today    2. Mixed hyperlipidemia  Continues with weight loss  Continue heart healthy diet  Labs reviewed with patient from May  -     LIPID PANEL; Future  -     METABOLIC PANEL, COMPREHENSIVE; Future    3. Elevated glucose  Hyperglycemia despite normal A1c  She is likely glucose intolerant  She may benefit from GLP-1 agonist if she flips over to 6.5 A1c  Encouraged heart healthy eating and exercise as she is already doing  -     HEMOGLOBIN A1C WITH EAG; Future    4. Need for hepatitis C screening test  -     HEPATITIS C AB; Future    5. Tachycardia   she attributes to some dehydration  She has coffee every morning and occasional red bull in the midmorning/afternoon   possibly Strattera   Tachycardia seems to be more persistent per vitals flowsheets  Will try propanolol in the morning  Follow-up at next visit  -     URINALYSIS W/ RFLX MICROSCOPIC; Future  Follow-up next visit      Given by Geri  -     naltrexone microspheres (VIVITROL) ER injection 380 mg          Chief Complaint   Patient presents with    Immunization/Injection     1 mo f/u       She is seeing her psychiatrist dr. Lupillo Whitley every 3 months now. She was seen 2 months ago. Yfn, therapist every 2 weeks  Case manger going on maternity leave  Calls  every month    History of drug abuse  Patient is doing very well. She has intentionally lost 50 pounds. 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 She is seeing GYN  ANNEMARIE Damico ([facility]    Tachycardia  She attributed her tachycardia to dehydration and some anxiety. She has propanolol but does not feel the tachycardia. She catches it on her phone. Hyperlipidemia  Discussed with patient and she is eating a heart healthy diet and exercising.   She has intentionally lost 50 pounds. Past Medical History:   Diagnosis Date    Anxiety 2010    Bee sting allergy     severe. Dr[facility]alee Lindsay [facility]: kidney disease 11/24/2012    Lyme disease     Musculoskeletal disorder 2009    Dislocated Rt shoulder    Recurrent UTI     hx pyelo. saw urology. took macrodantin age [age] year     Past Surgical History:   Procedure Laterality Date    HX ROTATOR CUFF REPAIR Right 05/07/2019    HX TONSILLECTOMY  2002    [place] SHOULDER SURG PROC UNLISTED Right 12/2010    torn labrum and tendon repair/right     Social History     Socioeconomic History    Marital status: SINGLE     Spouse name: Not on file    Number of children: Not on file    Years of education: Not on file    Highest education level: Not on file   Occupational History    Occupation: [employer]   Tobacco Use    Smoking status: Never Smoker    Smokeless tobacco: Never Used   Vaping Use    Vaping Use: Never used   Substance and Sexual Activity    Alcohol use: Yes     Comment: no ETOH since 12/1/2017    Drug use: Yes     Types: Opiates     Comment: sober since 12/1/2017    Sexual activity: Yes     Birth control/protection: Pill   Social History Narrative    Working full time at [address] [employer], 40 hours/week + overtime    Not doing night shifts        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 Strain:     Difficulty of Paying Living Expenses:    Food Insecurity:     Worried About Running Out of Food in the Last Year:     [address] in the Last Year:    Transportation Needs:     Lack of Transportation (Medical):      Lack of Transportation (Non-Medical):    Physical Activity:     Days of Exercise per Week:     Minutes of Exercise per Session:    Stress:     Feeling of Stress :    Social Connections:     Frequency of Communication with Friends and Family:     Frequency of Social Gatherings with Friends and Family:     Attends Latter day Services:     Active Member of Clubs or Organizations:     Attends Club or Organization Meetings:     Marital Status:      Family History   Problem Relation Age of Onset    Anxiety Mother     Kidney Disease Paternal Grandmother         also in cousins    Cancer Maternal Grandmother         skin     Current Outpatient Medications   Medication Sig Dispense Refill    busPIRone (BUSPAR) 10 mg tablet Take 1.5 Tabs by mouth three (3) times daily. 135 Tab 4    naltrexone microspheres (VivitroL) 380 mg ER injection As directed every month 4 Each 3    etonogestreL (Nexplanon) 68 mg impl by SubDERmal route.  atomoxetine (Strattera) 40 mg capsule Take 80 mg by mouth daily.  EPINEPHrine (EPIPEN) 0.3 mg/0.3 mL injection       propranolol (INDERAL) 20 mg tablet Take  by mouth as needed. As needed       FLUoxetine (PROZAC) 20 mg tablet Take 4 Tabs by mouth daily.  180 Tab 0     Allergies   Allergen Reactions    Bee Sting Kit Anaphylaxis     And wasps    Cefepime Hives and Swelling    Cephalosporins Anaphylaxis    Ciprofloxacin Anaphylaxis    Quinolones Anaphylaxis    Rocephin [Ceftriaxone] Hives    Zithromax [Azithromycin] Hives       Review of Systems - General ROS: negative for - chills or fever  Cardiovascular ROS: no chest pain or dyspnea on exertion  Respiratory ROS: no cough, shortness of breath, or wheezing    Visit Vitals  /79 (BP 1 Location: Left upper arm, BP Patient Position: Sitting, BP Cuff Size: Adult)   Pulse (!) 112   Temp 98.6 °F (37 °C) (Oral)   Resp 14   Ht 5' 4\" (1.626 m)   Wt 203 lb (92.1 kg)   SpO2 97%   BMI 34.84 kg/m²     Constitutional: [x] Appears well-developed and well-nourished [x] No apparent distress      [] Abnormal -     Mental status: [x] Alert and awake  [x] Oriented to person/place/time [x] Able to follow commands    [] Abnormal -     Eyes:   EOM    [x]  Normal    [] Abnormal -   Sclera  [x]  Normal    [] Abnormal -          Discharge [x]  None visible   [] Abnormal -     HENT: [x] Normocephalic, atraumatic  [] Abnormal -   [x] Mouth/Throat: Mucous membranes are moist    External Ears [x] Normal  [] Abnormal -    Neck: [x] No visualized mass [] Abnormal -     Pulmonary/Chest: [x] Respiratory effort normal   [x] No visualized signs of difficulty breathing or respiratory distress        [] Abnormal -      Musculoskeletal:   [x] Normal gait with no signs of ataxia         [x] Normal range of motion of neck        [] Abnormal -     Neurological:        [x] No Facial Asymmetry (Cranial nerve 7 motor function) (limited exam due to video visit)          [x] No gaze palsy        [] Abnormal -          Skin:        [x] No significant exanthematous lesions or discoloration noted on facial skin         [] Abnormal -            Psychiatric:       [x] Normal Affect [] Abnormal -        [x] No Hallucinations      ATTENTION:   This medical record was transcribed using an electronic medical records/speech recognition system. Although proofread, it may and can contain electronic, spelling and other errors. Corrections may be executed at a later time. Please contact us for any clarifications as needed. On this date 08/30/21  I have spent 25 minutes reviewing previous notes, test results and face to face with the patient discussing the diagnosis and importance of compliance with the treatment plan as well as documenting on the day of the visit.                                                                                                  Prevention    Cardiovascular profile  Family hx  Exercising:  Walking and riding horse    Blood pressure:  Health healthy diet:  Diabetes:  Cholesterol:  Renal function:      Cancer risk profile  Mammogram  PSA  Lung  Colonoscopy  Skin nonhealing in 2 weeks  Gyn abnormal bleeding/discharge/abd pain/pressure annually      Thyroid sx    Osteopenia prevention  Calcium 1000mg/day yes  Vitamin D 800iu/day yes    Mental health scale:  Depression  Anxiety  Sleep # of hours:  Energy Level:        Immunizations up-to-date  TDAP  Pneumonia vaccine  Flu vaccine  Shingles vaccine  HPV

## 2021-09-23 ENCOUNTER — OFFICE VISIT (OUTPATIENT)
Dept: INTERNAL MEDICINE CLINIC | Age: 26
End: 2021-09-23
Payer: COMMERCIAL

## 2021-09-23 ENCOUNTER — TELEPHONE (OUTPATIENT)
Dept: INTERNAL MEDICINE CLINIC | Age: 26
End: 2021-09-23

## 2021-09-23 VITALS
BODY MASS INDEX: 35.17 KG/M2 | DIASTOLIC BLOOD PRESSURE: 84 MMHG | SYSTOLIC BLOOD PRESSURE: 127 MMHG | HEART RATE: 92 BPM | OXYGEN SATURATION: 98 % | RESPIRATION RATE: 14 BRPM | HEIGHT: 64 IN | TEMPERATURE: 98 F | WEIGHT: 206 LBS

## 2021-09-23 DIAGNOSIS — Z91.030 BEE STING ALLERGY: ICD-10-CM

## 2021-09-23 DIAGNOSIS — F19.11 HISTORY OF DRUG ABUSE IN REMISSION (HCC): Primary | ICD-10-CM

## 2021-09-23 DIAGNOSIS — F19.10 SUBSTANCE ABUSE (HCC): ICD-10-CM

## 2021-09-23 PROCEDURE — 96372 THER/PROPH/DIAG INJ SC/IM: CPT | Performed by: NURSE PRACTITIONER

## 2021-09-23 PROCEDURE — 99213 OFFICE O/P EST LOW 20 MIN: CPT | Performed by: NURSE PRACTITIONER

## 2021-09-23 RX ORDER — EPINEPHRINE 0.3 MG/.3ML
0.3 INJECTION SUBCUTANEOUS
Qty: 2 EACH | Refills: 0 | Status: SHIPPED | OUTPATIENT
Start: 2021-09-23 | End: 2021-09-23

## 2021-09-23 RX ORDER — NALTREXONE 380 MG
KIT INTRAMUSCULAR
Qty: 4 EACH | Refills: 3 | Status: SHIPPED | OUTPATIENT
Start: 2021-09-23 | End: 2021-09-27

## 2021-09-23 NOTE — PROGRESS NOTES
Angelene Olszewski (: 1995) is a 31 y.o. female, established patient, here for evaluation of the following chief complaint(s):  Immunization/Injection       ASSESSMENT/PLAN:  Below is the assessment and plan developed based on review of pertinent history, physical exam, labs, studies, and medications. 1. History of drug abuse in remission [facility]) --return in 1 month for IM injection. -     naltrexone microspheres (VIVITROL) ER injection 380 mg; 380 mg, IntraMUSCular, ONCE, 1 dose, On Thu 21 at 1100    2. Bee sting allergy  -     EPINEPHrine (EPIPEN) 0.3 mg/0.3 mL injection; 0.3 mL by IntraMUSCular route once as needed for Allergic Response for up to 1 dose., Normal, Disp-2 Each, R-0          SUBJECTIVE/OBJECTIVE:  HPI  Presents for Vivitrol ER injection. 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 Tiffanie is stable and is leaving for a conference at [facility] next week. Has history of anaphylactic reaction with bee stings and her EpiPen expires this month. We will send in a refill. Review of Systems   Constitutional: Negative for chills and fever. Respiratory: Negative for cough and shortness of breath. Cardiovascular: Negative for chest pain. /84 (BP 1 Location: Right upper arm, BP Patient Position: Sitting, BP Cuff Size: Adult)   Pulse 92   Temp 98 °F (36.7 °C) (Oral)   Resp 14   Ht 5' 4\" (1.626 m)   Wt 206 lb (93.4 kg)   SpO2 98%   BMI 35.36 kg/m²   Physical Exam  Vitals and nursing note reviewed. Constitutional:       Appearance: Normal appearance. HENT:      Head: Normocephalic. Cardiovascular:      Rate and Rhythm: Normal rate and regular rhythm. Pulmonary:      Effort: Pulmonary effort is normal.      Breath sounds: Normal breath sounds. Musculoskeletal:         General: Normal range of motion. Neurological:      General: No focal deficit present.       Mental Status: She is alert and oriented to person, place, and time. Psychiatric:         Mood and Affect: Mood normal.         Behavior: Behavior normal.               An electronic signature was used to authenticate this note.   -- Ericka Richards NP

## 2021-09-23 NOTE — PROGRESS NOTES
Patient present for routine injection. Pt denies any symptoms , reactions or allergies that would exclude them from being injection today. Risks and adverse reactions were discussed and the injection was given to them. All questions were addressed. Pt was observed for 10 min post injection. There were no reactions observed.     La Nena Ruelas LPN

## 2021-09-23 NOTE — TELEPHONE ENCOUNTER
Patient presented to office for Vivitrol injection, so nurse called patient's [facility] specialty pharmacy (spoke with pharmacy tech, Ruben Ivan) to order next refill, but no refills remain. Nurse pended refill for PCP approval & electronic submission of new prescription to pharmacy.

## 2021-09-26 ENCOUNTER — APPOINTMENT (OUTPATIENT)
Dept: CT IMAGING | Age: 26
End: 2021-09-26
Attending: EMERGENCY MEDICINE
Payer: COMMERCIAL

## 2021-09-26 ENCOUNTER — HOSPITAL ENCOUNTER (EMERGENCY)
Age: 26
Discharge: HOME | End: 2021-09-26
Attending: EMERGENCY MEDICINE
Payer: COMMERCIAL

## 2021-09-26 VITALS
DIASTOLIC BLOOD PRESSURE: 70 MMHG | TEMPERATURE: 98 F | HEIGHT: 64 IN | HEART RATE: 90 BPM | BODY MASS INDEX: 34.59 KG/M2 | OXYGEN SATURATION: 99 % | WEIGHT: 202.6 LBS | RESPIRATION RATE: 20 BRPM | SYSTOLIC BLOOD PRESSURE: 120 MMHG

## 2021-09-26 DIAGNOSIS — S00.83XA CONTUSION OF FACE, INITIAL ENCOUNTER: Primary | ICD-10-CM

## 2021-09-26 PROCEDURE — 70486 CT MAXILLOFACIAL W/O DYE: CPT

## 2021-09-26 PROCEDURE — 99283 EMERGENCY DEPT VISIT LOW MDM: CPT
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
## 2021-09-26 NOTE — LETTER
[address] EMERGENCY DEPT  [address] [facility]  [place] [address]  [phone]    Work/School Note    Date: 9/26/2021    To Whom It May concern:    Kaye Tinsley was seen and treated today in the emergency room by the following provider(s):  Attending Provider: Pete Moody MD.      Kaye Tinsley may return to work on 9/27/21.     Sincerely,          Aquilino Moran MD

## 2021-09-26 NOTE — ED NOTES
Discharged home with instructions. Dr. Jaimie Johnson discussed CT scan results with patient prior to discharge. Verbalized understanding of instructions.  Left for home in no acute distress

## 2021-09-26 NOTE — ED NOTES
Patient is reassessed by DR. Kerry Bourgeois and is discharged home with  Instructions. Left for home in no acute distress.

## 2021-09-27 DIAGNOSIS — F19.10 SUBSTANCE ABUSE (HCC): ICD-10-CM

## 2021-09-27 RX ORDER — NALTREXONE 380 MG
KIT INTRAMUSCULAR
Qty: 4 EACH | Refills: 2 | Status: SHIPPED | OUTPATIENT
Start: 2021-09-27 | End: 2022-01-21

## 2021-10-06 ENCOUNTER — TELEPHONE (OUTPATIENT)
Dept: INTERNAL MEDICINE CLINIC | Age: 26
End: 2021-10-06

## 2021-10-06 NOTE — TELEPHONE ENCOUNTER
Nurse spoke with pharmacist at patient's [facility] speciality pharmacy & scheduled shipping of patient's 380mg vivitrol injection to PCP's office on Tuesday, October 12, 2021. Patient due for next injection on 10/23/2021.  Thank you

## 2021-10-25 ENCOUNTER — OFFICE VISIT (OUTPATIENT)
Dept: INTERNAL MEDICINE CLINIC | Age: 26
End: 2021-10-25
Payer: COMMERCIAL

## 2021-10-25 VITALS
HEIGHT: 61 IN | WEIGHT: 206 LBS | HEART RATE: 84 BPM | DIASTOLIC BLOOD PRESSURE: 76 MMHG | OXYGEN SATURATION: 97 % | TEMPERATURE: 98.9 F | SYSTOLIC BLOOD PRESSURE: 113 MMHG | BODY MASS INDEX: 38.89 KG/M2 | RESPIRATION RATE: 16 BRPM

## 2021-10-25 DIAGNOSIS — F19.11 HISTORY OF DRUG ABUSE IN REMISSION (HCC): Primary | ICD-10-CM

## 2021-10-25 PROCEDURE — 96372 THER/PROPH/DIAG INJ SC/IM: CPT | Performed by: INTERNAL MEDICINE

## 2021-10-25 PROCEDURE — 99212 OFFICE O/P EST SF 10 MIN: CPT | Performed by: INTERNAL MEDICINE
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
## 2021-11-29 ENCOUNTER — OFFICE VISIT (OUTPATIENT)
Dept: INTERNAL MEDICINE CLINIC | Age: 26
End: 2021-11-29
Payer: COMMERCIAL

## 2021-11-29 VITALS
DIASTOLIC BLOOD PRESSURE: 82 MMHG | SYSTOLIC BLOOD PRESSURE: 120 MMHG | RESPIRATION RATE: 16 BRPM | BODY MASS INDEX: 40.22 KG/M2 | WEIGHT: 213 LBS | OXYGEN SATURATION: 97 % | HEIGHT: 61 IN | TEMPERATURE: 98.6 F | HEART RATE: 103 BPM

## 2021-11-29 DIAGNOSIS — E78.2 MIXED HYPERLIPIDEMIA: ICD-10-CM

## 2021-11-29 DIAGNOSIS — F19.11 HISTORY OF DRUG ABUSE IN REMISSION (HCC): Primary | ICD-10-CM

## 2021-11-29 DIAGNOSIS — M79.2 NERVE PAIN: ICD-10-CM

## 2021-11-29 PROCEDURE — 99213 OFFICE O/P EST LOW 20 MIN: CPT | Performed by: INTERNAL MEDICINE

## 2021-11-29 PROCEDURE — 96372 THER/PROPH/DIAG INJ SC/IM: CPT | Performed by: INTERNAL MEDICINE

## 2021-11-29 RX ORDER — GABAPENTIN 300 MG/1
300 CAPSULE ORAL 3 TIMES DAILY
COMMUNITY
End: 2021-11-29

## 2021-11-29 RX ORDER — GABAPENTIN 300 MG/1
300 CAPSULE ORAL 2 TIMES DAILY
Qty: 60 CAPSULE | Refills: 1
Start: 2021-11-29 | End: 2021-12-27

## 2021-11-29 NOTE — PROGRESS NOTES
After obtaining consent, and per orders of Dr. Houston Members, injection of Vivtrol given by Maciel Swanson LPN. Patient instructed to remain in clinic for 20 minutes afterwards, and to report any adverse reaction to me immediately.

## 2021-11-29 NOTE — PROGRESS NOTES
Diagnoses and all orders for this visit:    1. History of drug abuse in remission ([address]  Vivitrol injection given    2. Nerve pain  Controlled with gabapentin and Tylenol . She has occasional breakthrough pain at night with discomfort. Working with Dr. Laura Borden/prescribing gabapentin  -     gabapentin (NEURONTIN) 300 mg capsule; Take 1 Capsule by mouth two (2) times a day. Max Daily Amount: 600 mg. rx by dr. Dimple [facility]    3. Mixed hyperlipidemia  Discussed with patient and prefers not to be on medication  We will try Benechol and omaira  Mother with hyperlipidemia and on statin      Other orders  -     naltrexone microspheres (VIVITROL) ER injection 380 mg           Chief Complaint   Patient presents with    Immunization/Injection     Elbow dislocation/compartment syndrome  Orthopedics following: Dr. Yamel Santana  She reports 11/2/2021 riding friends horse. Horse bucked fell on right elbow and brachial artery severed, dislocated elbow developed compartment syndome so required fascieatomy and brachial artery repair. She had elbow realigned    Pain  She has persistent nerve pain involving median nerve. She is able to move her fingers and thumb. Gabapentin 300 mg twice a day has been taking care of her pain but she does note some breakthrough pain at night. She readjusts. 11/16/2021 surgery for ligament reparir, not indicated  She has been compliant with wearing her right arm brace and needs PT  Currently on disablilty    She was on doxycycline for cellulitis and has been monitored for several vascular hematomas of her right arm. She required ketamine drip requireing ICU care. Given vivitrol was not responding to other pain meds  HPMP aware    She will be out on disablity through 1/9/2021      Thanksgiving outer roman     Hyperlipidemia  She has been trying to eat a heart healthy diet. Her weight is stable.       History of drug use  appt with dr. Walker Cam tomorrow and may be coming off of BuSpar    Past Medical History:   Diagnosis Date    Anxiety 2010    Bee sting allergy     severe. Dr. Margy Oh [facility]: kidney disease 11/24/2012    Lyme disease     Musculoskeletal disorder 2009    Dislocated Rt shoulder    Recurrent UTI     hx pyelo. saw urology. took macrodantin age [age] year     Past Surgical History:   Procedure Laterality Date    HX ROTATOR CUFF REPAIR Right 05/07/2019    HX TONSILLECTOMY  2002    ID SHOULDER SURG PROC UNLISTED Right 12/2010    torn labrum and tendon repair/right     Social History     Socioeconomic History    Marital status: SINGLE   Occupational History    Occupation: [employer]   Tobacco Use    Smoking status: Never Smoker    Smokeless tobacco: Never Used   Vaping Use    Vaping Use: Never used   Substance and Sexual Activity    Alcohol use: Yes     Comment: no ETOH since 12/1/2017    Drug use: Yes     Types: Opiates     Comment: sober since 12/1/2017    Sexual activity: Yes     Birth control/protection: Pill   Social History Narrative    Working full time at [address] [place] [address], 40 hours/week + overtime    Not doing night shifts        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 [address] [place] [address] Drive History   Problem Relation Age of Onset    Anxiety Mother     Kidney Disease Paternal Grandmother         also in cousins    Cancer Maternal Grandmother         skin     Current Outpatient Medications   Medication Sig Dispense Refill    naltrexone microspheres (VivitroL) 380 mg ER injection INJECT 380 MG INTRAMUSCULARLY EVERY MONTH. REFRIGERATE 4 Each 2    busPIRone (BUSPAR) 10 mg tablet Take 1.5 Tabs by mouth three (3) times daily. 135 Tab 4    etonogestreL (Nexplanon) 68 mg impl by SubDERmal route.  atomoxetine (Strattera) 40 mg capsule Take 80 mg by mouth daily.  EPINEPHrine (EPIPEN) 0.3 mg/0.3 mL injection       propranolol (INDERAL) 20 mg tablet Take  by mouth as needed.  As needed       FLUoxetine (PROZAC) 20 mg tablet Take 4 Tabs by mouth daily. 180 Tab 0    gabapentin (NEURONTIN) 300 mg capsule Take 300 mg by mouth three (3) times daily.        Allergies   Allergen Reactions    Bee Sting Kit Anaphylaxis     And wasps    Cefepime Hives and Swelling    Cephalosporins Anaphylaxis    Ciprofloxacin Anaphylaxis    Quinolones Anaphylaxis    Rocephin [Ceftriaxone] Hives    Zithromax [Azithromycin] Hives       Review of Systems - General ROS: negative for - chills or fever  Cardiovascular ROS: no chest pain or dyspnea on exertion  Respiratory ROS: no cough, shortness of breath, or wheezing    Visit Vitals  /82 (BP 1 Location: Left upper arm, BP Patient Position: Sitting, BP Cuff Size: Adult)   Pulse (!) 103   Temp 98.6 °F (37 °C) (Oral)   Resp 16   Ht 5' 1\" (1.549 m)   Wt 213 lb (96.6 kg)   SpO2 97%   BMI 40.25 kg/m²     Constitutional: [x] Appears well-developed and well-nourished [x] No apparent distress      [] Abnormal -     Mental status: [x] Alert and awake  [x] Oriented to person/place/time [x] Able to follow commands    [] Abnormal -     Eyes:   EOM    [x]  Normal    [] Abnormal -   Sclera  [x]  Normal    [] Abnormal -          Discharge [x]  None visible   [] Abnormal -     HENT: [x] Normocephalic, atraumatic  [] Abnormal -   [x] Mouth/Throat: Mucous membranes are moist    External Ears [x] Normal  [] Abnormal -    Neck: [x] No visualized mass [] Abnormal -     Pulmonary/Chest: [x] Respiratory effort normal   [x] No visualized signs of difficulty breathing or respiratory distress        [] Abnormal -      Musculoskeletal:   [x] Normal gait with no signs of ataxia         [x] Normal range of motion of neck        [] Abnormal -     Neurological:        [x] No Facial Asymmetry (Cranial nerve 7 motor function) (limited exam due to video visit)          [x] No gaze palsy        [] Abnormal -          Skin:        [x] No significant exanthematous lesions or discoloration noted on facial skin         [] Abnormal -            Psychiatric: [x] Normal Affect [] Abnormal -        [x] No Hallucinations      ATTENTION:   This medical record was transcribed using an electronic medical records/speech recognition system. Although proofread, it may and can contain electronic, spelling and other errors. Corrections may be executed at a later time. Please contact us for any clarifications as needed. On this date 11/29/21  I have spent 28 minutes reviewing previous notes, test results and face to face with the patient discussing the diagnosis and importance of compliance with the treatment plan as well as documenting on the day of the visit.

## 2021-12-09 ENCOUNTER — TELEPHONE (OUTPATIENT)
Dept: INTERNAL MEDICINE CLINIC | Age: 26
End: 2021-12-09

## 2021-12-09 NOTE — TELEPHONE ENCOUNTER
----- Message from Hillary Garrett sent at 12/9/2021  3:15 PM EST -----  Subject: Message to Provider    QUESTIONS  Information for Provider? Pt's prescription of VivitroL will be at the   office on 12/15. Please advise.   ---------------------------------------------------------------------------  --------------  CALL BACK INFO  What is the best way for the office to contact you? OK to leave message on   voicemail  Preferred Call Back Phone Number? [phone]  ---------------------------------------------------------------------------  --------------  SCRIPT ANSWERS  Relationship to Patient? Third Party  Representative Name?  Pharmacy

## 2021-12-27 ENCOUNTER — OFFICE VISIT (OUTPATIENT)
Dept: INTERNAL MEDICINE CLINIC | Age: 26
End: 2021-12-27
Payer: COMMERCIAL

## 2021-12-27 VITALS
WEIGHT: 213 LBS | DIASTOLIC BLOOD PRESSURE: 84 MMHG | TEMPERATURE: 98 F | HEIGHT: 61 IN | RESPIRATION RATE: 16 BRPM | HEART RATE: 96 BPM | OXYGEN SATURATION: 97 % | SYSTOLIC BLOOD PRESSURE: 118 MMHG | BODY MASS INDEX: 40.22 KG/M2

## 2021-12-27 DIAGNOSIS — M79.2 NERVE PAIN: ICD-10-CM

## 2021-12-27 DIAGNOSIS — F41.9 ANXIETY: ICD-10-CM

## 2021-12-27 DIAGNOSIS — F19.11 HISTORY OF DRUG ABUSE IN REMISSION (HCC): Primary | ICD-10-CM

## 2021-12-27 DIAGNOSIS — I10 ELEVATED HEART RATE WITH ELEVATED BLOOD PRESSURE AND DIAGNOSIS OF HYPERTENSION: ICD-10-CM

## 2021-12-27 DIAGNOSIS — R00.9 ELEVATED HEART RATE WITH ELEVATED BLOOD PRESSURE AND DIAGNOSIS OF HYPERTENSION: ICD-10-CM

## 2021-12-27 PROCEDURE — 99213 OFFICE O/P EST LOW 20 MIN: CPT | Performed by: INTERNAL MEDICINE

## 2021-12-27 PROCEDURE — 96372 THER/PROPH/DIAG INJ SC/IM: CPT | Performed by: INTERNAL MEDICINE

## 2021-12-27 RX ORDER — GABAPENTIN 300 MG/1
300 CAPSULE ORAL
Qty: 30 CAPSULE | Refills: 0
Start: 2021-12-27 | End: 2022-06-22

## 2021-12-27 RX ORDER — BUSPIRONE HYDROCHLORIDE 5 MG/1
5 TABLET ORAL 3 TIMES DAILY
Qty: 90 TABLET | Refills: 0
Start: 2021-12-27 | End: 2022-01-24

## 2021-12-27 NOTE — PROGRESS NOTES
After obtaining consent, and per orders of Dr. Jennifer Hercules , injection of Vivitrol 380mg/vial given by Lana Crowder LPN. Patient instructed to remain in clinic for 20 minutes afterwards, and to report any adverse reaction to me immediately.

## 2021-12-27 NOTE — PROGRESS NOTES
Diagnoses and all orders for this visit:    1. History of drug abuse in remission [facility])  Doing very well  Continue monthly injections  She may be able to come off medications. She is working with [facility]  Follow-up with psychiatry  -     naltrexone microspheres (VIVITROL) ER injection 380 mg    2. Nerve pain  Controlled  Will be weaning off. She is working with pain doctor, Dr. Artem Ro  -     gabapentin (NEURONTIN) 300 mg capsule; Take 1 Capsule by mouth nightly. Max Daily Amount: 300 mg. rx by dr. Marisol Caldwell orthopedics    3. Anxiety  Weaning off BuSpar has not effective  Continue Prozac 80 mg  -     busPIRone (BUSPAR) 5 mg tablet; Take 1 Tablet by mouth three (3) times daily. Dr. Brandon sahaing    4. Elevated heart rate with elevated blood pressure and diagnosis of hypertension  Noticed patient's heart rate has been elevated since May 2021  Per chart review possibly related to Strattera. She will monitor at home and discuss with her psychiatrist Dr. Pastor Piersonutz rate on the higher side in 2015 but appeared to have stabilized    Hyperlipidemia her 10-year cardiovascular risk score  From May 2021 was 2.2  Prefers to stay off medication  Continue heart healthy diet         Chief Complaint   Patient presents with    Immunization/Injection     monthly injection      Elbow dislocation/compartment syndrome  Orthopedics following: Dr. Jordi Kirk follow up in February . Possibly mayneed scar revision PT  Improved. She reports 11/2/2021 riding friends horse. Horse bucked fell on right elbow and brachial artery severed, dislocated elbow developed compartment syndome so required fascieatomy and brachial artery repair.   She had elbow realigned    Pain  She is seeing dr. Sachi Owens to help contorl pain  She is now down to eros 300 mg just at night  Occasional pain break thru  Will see how she does when going back to work/ jan 9  disablity  She will be out on disablity through 1/9/2021      Hyperlipidemia  She has been trying to eat a heart healthy diet. Her weight is stable. History of drug use  appt with dr. Urbano Gomez tomorrow and may be coming off of BuSpar    Past Medical History:   Diagnosis Date    Anxiety 2010    Bee sting allergy     severe. Dr. Sheba Solitario at [facility]: kidney disease 11/24/2012    Lyme disease     Musculoskeletal disorder 2009    Dislocated Rt shoulder    Recurrent UTI     hx pyelo. saw urology. took macrodantin age [age] year     Past Surgical History:   Procedure Laterality Date    HX ROTATOR CUFF REPAIR Right 05/07/2019    HX TONSILLECTOMY  2002    [place] SHOULDER SURG PROC UNLISTED Right 12/2010    torn labrum and tendon repair/right     Social History     Socioeconomic History    Marital status: SINGLE   Occupational History    Occupation: [employer]   Tobacco Use    Smoking status: Never Smoker    Smokeless tobacco: Never Used   Vaping Use    Vaping Use: Never used   Substance and Sexual Activity    Alcohol use: Yes     Comment: no ETOH since 12/1/2017    Drug use: Yes     Types: Opiates     Comment: sober since 12/1/2017    Sexual activity: Yes     Birth control/protection: Pill   Social History Narrative    Working full time at [employer] [address], 40 hours/week + overtime    Not doing night shifts        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 Max Daily Amount: 600 mg. rx by dr. Vick Nunez orthopedics 60 Capsule 1    naltrexone microspheres (VivitroL) 380 mg ER injection INJECT 380 MG INTRAMUSCULARLY EVERY MONTH. REFRIGERATE 4 Each 2    busPIRone (BUSPAR) 10 mg tablet Take 1.5 Tabs by mouth three (3) times daily.  135 Tab 4    etonogestreL (Nexplanon) 68 mg impl by SubDERmal route.  atomoxetine (Strattera) 40 mg capsule Take 80 mg by mouth daily.  EPINEPHrine (EPIPEN) 0.3 mg/0.3 mL injection       propranolol (INDERAL) 20 mg tablet Take  by mouth as needed. As needed       FLUoxetine (PROZAC) 20 mg tablet Take 4 Tabs by mouth daily.  180 Tab 0     Allergies   Allergen Reactions    Bee Sting Kit Anaphylaxis     And wasps    Cefepime Hives and Swelling    Cephalosporins Anaphylaxis    Ciprofloxacin Anaphylaxis    Quinolones Anaphylaxis    Rocephin [Ceftriaxone] Hives    Zithromax [Azithromycin] Hives       Review of Systems - General ROS: negative for - chills or fever  Cardiovascular ROS: no chest pain or dyspnea on exertion  Respiratory ROS: no cough, shortness of breath, or wheezing    Visit Vitals  /84 (BP 1 Location: Left upper arm, BP Patient Position: Sitting, BP Cuff Size: Adult)   Pulse 86   Temp 98 °F (36.7 °C) (Oral)   Resp 16   Ht 5' 1\" (1.549 m)   Wt 213 lb (96.6 kg)   SpO2 97%   BMI 40.25 kg/m²     Constitutional: [x] Appears well-developed and well-nourished [x] No apparent distress      [] Abnormal -     Mental status: [x] Alert and awake  [x] Oriented to person/place/time [x] Able to follow commands    [] Abnormal -     Eyes:   EOM    [x]  Normal    [] Abnormal -   Sclera  [x]  Normal    [] Abnormal -          Discharge [x]  None visible   [] Abnormal -     HENT: [x] Normocephalic, atraumatic  [] Abnormal -   [x] Mouth/Throat: Mucous membranes are moist    External Ears [x] Normal  [] Abnormal -    Neck: [x] No visualized mass [] Abnormal -     Pulmonary/Chest: [x] Respiratory effort normal   [x] No visualized signs of difficulty breathing or respiratory distress        [] Abnormal -      Musculoskeletal:   [x] Normal gait with no signs of ataxia         [x] Normal range of motion of neck        [] Abnormal -     Neurological:        [x] No Facial Asymmetry (Cranial nerve 7 motor function) (limited exam due to video visit)          [x] No gaze palsy        [] Abnormal -          Skin:        [x] No significant exanthematous lesions or discoloration noted on facial skin         [] Abnormal -            Psychiatric:       [x] Normal Affect [] Abnormal -        [x] No Hallucinations      ATTENTION:   This medical record was transcribed using an electronic medical records/speech recognition system. Although proofread, it may and can contain electronic, spelling and other errors. Corrections may be executed at a later time. Please contact us for any clarifications as needed. On this date 12/27/21  I have spent 20 minutes reviewing previous notes, test results and face to face with the patient discussing the diagnosis and importance of compliance with the treatment plan as well as documenting on the day of the visit.

## 2022-01-10 ENCOUNTER — TELEPHONE (OUTPATIENT)
Dept: INTERNAL MEDICINE CLINIC | Age: 27
End: 2022-01-10

## 2022-01-10 NOTE — TELEPHONE ENCOUNTER
I SPOKE WITH [facility] TO CONFIRM VIVITROL DELIVERY, MEDICATION WILL BE DELIVERED ON 1/13/22

## 2022-01-10 NOTE — TELEPHONE ENCOUNTER
----- Message from Xiomara Garay sent at 1/7/2022  6:33 PM EST -----  Subject: Medication Problem    QUESTIONS  Name of Medication? naltrexone microspheres (VivitroL) 380 mg ER injection  Patient-reported dosage and instructions? one injection per month  What question or problem do you have with the medication? Yasmine Ceja from [facility]   specialty pharmacy Patient scheduled it needs set to the providers office   on Jan 13th to [address] Yasmine Ceja is just calling to confirm address and date of delivery. Preferred Pharmacy? [facility] [address], [address] [facility] phone number (if available)? [phone]  Additional Information for Provider? please call back at #[phone] to   confirm  ---------------------------------------------------------------------------  --------------  [address]  What is the best way for the office to contact you? OK to leave message on   voicemail  Preferred Call Back Phone Number? [phone]  ---------------------------------------------------------------------------  --------------  SCRIPT ANSWERS  Relationship to Patient? Third Party  Representative Name?  Yasmine Ceja from [facility] specialty pharmacy

## 2022-01-21 DIAGNOSIS — F19.10 SUBSTANCE ABUSE (HCC): ICD-10-CM

## 2022-01-21 RX ORDER — NALTREXONE 380 MG
KIT INTRAMUSCULAR
Qty: 3 EACH | Refills: 4 | Status: SHIPPED | OUTPATIENT
Start: 2022-01-21 | End: 2022-06-22 | Stop reason: SDUPTHER

## 2022-01-24 ENCOUNTER — OFFICE VISIT (OUTPATIENT)
Dept: INTERNAL MEDICINE CLINIC | Age: 27
End: 2022-01-24
Payer: COMMERCIAL

## 2022-01-24 VITALS
SYSTOLIC BLOOD PRESSURE: 134 MMHG | DIASTOLIC BLOOD PRESSURE: 81 MMHG | HEART RATE: 79 BPM | HEIGHT: 61 IN | BODY MASS INDEX: 40.4 KG/M2 | RESPIRATION RATE: 16 BRPM | OXYGEN SATURATION: 98 % | TEMPERATURE: 97.8 F | WEIGHT: 214 LBS

## 2022-01-24 DIAGNOSIS — F19.91 HISTORY OF INTRAVENOUS DRUG USE IN REMISSION: Primary | ICD-10-CM

## 2022-01-24 PROCEDURE — 99212 OFFICE O/P EST SF 10 MIN: CPT | Performed by: INTERNAL MEDICINE

## 2022-01-24 PROCEDURE — 96372 THER/PROPH/DIAG INJ SC/IM: CPT | Performed by: INTERNAL MEDICINE

## 2022-01-24 NOTE — PROGRESS NOTES
Diagnoses and all orders for this visit:    1. History of intravenous drug use in remission  Her contract with [employer] [facility] will end in January 2023. No new changes since last visit  -     naltrexone microspheres (VIVITROL) ER injection 380 mg    Anxiety  She is currently now off BuSpar  Following with psychiatry    Hyperlipidemia her 10-year cardiovascular risk score  From May 2021 was 2.2  Prefers to stay off medication  Continue heart healthy diet         Chief Complaint   Patient presents with    Follow-up     History of drug use  hpmp: Contract well in January 2023  No new changes        Elbow dislocation/compartment syndrome  Orthopedics following: Dr. Gustav Sandifer follow up in February . She is taking gabapentin in the evening. She is taking ibuprofen for breakthrough pain. Background  She reports 11/2/2021 riding friends horse. Horse bucked fell on right elbow and brachial artery severed, dislocated elbow developed compartment syndome so required fascieatomy and brachial artery repair. She had elbow realigned          Hyperlipidemia  She has been trying to eat a heart healthy diet. Her weight is stable. History of drug use  appt with dr. Donte Garza tomorrow and may be coming off of Nicki Small     Past Medical History:   Diagnosis Date    Anxiety 2010    Bee sting allergy     severe. Dr. Kaela Chance at [facility]: kidney disease 11/24/2012    Lyme disease     Musculoskeletal disorder 2009    Dislocated Rt shoulder    Recurrent UTI     hx pyelo. saw urology.   took macrodantin age [age] year     Past Surgical History:   Procedure Laterality Date    HX ROTATOR CUFF REPAIR Right 05/07/2019    HX TONSILLECTOMY  2002    [place] SHOULDER SURG PROC UNLISTED Right 12/2010    torn labrum and tendon repair/right     Social History     Socioeconomic History    Marital status: SINGLE   Occupational History    Occupation: [employer]   Tobacco Use    Smoking status: Never Smoker    Smokeless tobacco: Never Used   Vaping Use    Vaping Use: Never used   Substance and Sexual Activity    Alcohol use: Yes     Comment: no ETOH since 12/1/2017    Drug use: Yes     Types: Opiates     Comment: sober since 12/1/2017    Sexual activity: Yes     Birth control/protection: Pill   Social History Narrative    Working full time at [address], 40 hours/week + overtime    Not doing night shifts        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 REFRIGERATE 3 Each 4    gabapentin (NEURONTIN) 300 mg capsule Take 1 Capsule by mouth nightly. Max Daily Amount: 300 mg. rx by dr. Jacinto Light orthopedics 30 Capsule 0    etonogestreL (Nexplanon) 68 mg impl by SubDERmal route.  atomoxetine (Strattera) 40 mg capsule Take 80 mg by mouth daily.  EPINEPHrine (EPIPEN) 0.3 mg/0.3 mL injection       FLUoxetine (PROZAC) 20 mg tablet Take 4 Tabs by mouth daily. 180 Tab 0    busPIRone (BUSPAR) 5 mg tablet Take 1 Tablet by mouth three (3) times daily. Dr. Erwin Lin rxing (Patient not taking: Reported on 1/24/2022) 90 Tablet 0    propranolol (INDERAL) 20 mg tablet Take  by mouth as needed.  As needed        Allergies   Allergen Reactions    Bee Sting Kit Anaphylaxis     And wasps    Cefepime Hives and Swelling    Cephalosporins Anaphylaxis    Ciprofloxacin Anaphylaxis    Quinolones Anaphylaxis    Rocephin [Ceftriaxone] Hives    Zithromax [Azithromycin] Hives       Review of Systems - General ROS: negative for - chills or fever  Cardiovascular ROS: no chest pain or dyspnea on exertion  Respiratory ROS: no cough, shortness of breath, or wheezing    Visit Vitals  /81 (BP 1 Location: Left upper arm, BP Patient Position: Sitting, BP Cuff Size: Adult)   Pulse 79   Temp 97.8 °F (36.6 °C) (Oral)   Resp 16   Ht 5' 1\" (1.549 m)   Wt 214 lb (97.1 kg)   SpO2 98%   BMI 40.43 kg/m²     Constitutional: [x] Appears well-developed and well-nourished [x] No apparent distress      [] Abnormal -     Mental status: [x] Alert and awake  [x] Oriented to person/place/time [x] Able to follow commands    [] Abnormal -     Eyes:   EOM    [x]  Normal    [] Abnormal -   Sclera  [x]  Normal    [] Abnormal -          Discharge [x]  None visible   [] Abnormal -     HENT: [x] Normocephalic, atraumatic  [] Abnormal -   [x] Mouth/Throat: Mucous membranes are moist    External Ears [x] Normal  [] Abnormal -    Neck: [x] No visualized mass [] Abnormal -     Pulmonary/Chest: [x] Respiratory effort normal   [x] No visualized signs of difficulty breathing or respiratory distress        [] Abnormal -      Musculoskeletal:   [x] Normal gait with no signs of ataxia         [x] Normal range of motion of neck        [] Abnormal -     Neurological:        [x] No Facial Asymmetry (Cranial nerve 7 motor function) (limited exam due to video visit)          [x] No gaze palsy        [] Abnormal -          Skin:        [x] No significant exanthematous lesions or discoloration noted on facial skin         [] Abnormal -            Psychiatric:       [x] Normal Affect [] Abnormal -        [x] No Hallucinations      ATTENTION:   This medical record was transcribed using an electronic medical records/speech recognition system. Although proofread, it may and can contain electronic, spelling and other errors. Corrections may be executed at a later time. Please contact us for any clarifications as needed. On this date 01/24/22  I have spent 20 minutes reviewing previous notes, test results and face to face with the patient discussing the diagnosis and importance of compliance with the treatment plan as well as documenting on the day of the visit.

## 2022-01-24 NOTE — PROGRESS NOTES
After obtaining consent, and per orders of Dr.Dr. Mackey Bitter  injection of Vivtrol given by Sweetie Spring LPN. Patient instructed to remain in clinic for 20 minutes afterwards, and to report any adverse reaction to me immediately.

## 2022-02-04 ENCOUNTER — TELEPHONE (OUTPATIENT)
Dept: INTERNAL MEDICINE CLINIC | Age: 27
End: 2022-02-04

## 2022-02-04 NOTE — TELEPHONE ENCOUNTER
PSR reached out to patient to reschedule appointment on 02/28 due to provider being out of office - no answer, left detailed message of cancellation and need to reschedule, also sent my chart message  If patient returns call please assist in rescheduling

## 2022-02-09 ENCOUNTER — TELEPHONE (OUTPATIENT)
Dept: INTERNAL MEDICINE CLINIC | Age: 27
End: 2022-02-09

## 2022-02-09 NOTE — TELEPHONE ENCOUNTER
----- Message from Jelena Brian sent at 2/8/2022  5:49 PM EST -----  Subject: Message to Provider    QUESTIONS  Information for Provider? Pt meds will be del on 2/15 to the office per   [facility]   ---------------------------------------------------------------------------  --------------  CALL BACK INFO  What is the best way for the office to contact you? Do not leave any   message, patient will call back for answer  Preferred Call Back Phone Number? [phone]  ---------------------------------------------------------------------------  --------------  SCRIPT ANSWERS  Relationship to Patient? Third Party  Representative Name?  [facility]

## 2022-02-21 ENCOUNTER — OFFICE VISIT (OUTPATIENT)
Dept: INTERNAL MEDICINE CLINIC | Age: 27
End: 2022-02-21
Payer: COMMERCIAL

## 2022-02-21 VITALS
WEIGHT: 211.6 LBS | OXYGEN SATURATION: 98 % | HEART RATE: 83 BPM | RESPIRATION RATE: 14 BRPM | TEMPERATURE: 98.1 F | BODY MASS INDEX: 39.95 KG/M2 | SYSTOLIC BLOOD PRESSURE: 105 MMHG | DIASTOLIC BLOOD PRESSURE: 70 MMHG | HEIGHT: 61 IN

## 2022-02-21 DIAGNOSIS — F19.11 HISTORY OF DRUG ABUSE IN REMISSION (HCC): Primary | ICD-10-CM

## 2022-02-21 DIAGNOSIS — E78.2 MIXED HYPERLIPIDEMIA: ICD-10-CM

## 2022-02-21 PROCEDURE — 99213 OFFICE O/P EST LOW 20 MIN: CPT | Performed by: NURSE PRACTITIONER

## 2022-02-21 PROCEDURE — 96372 THER/PROPH/DIAG INJ SC/IM: CPT | Performed by: NURSE PRACTITIONER

## 2022-02-21 NOTE — PROGRESS NOTES
After obtaining consent, and per orders of [address], injection of Vivitrol given by Ashley Pedroza LPN. Patient instructed to remain in clinic for 20 minutes afterwards, and to report any adverse reaction to me immediately.  Bill Moser LPN

## 2022-02-21 NOTE — PROGRESS NOTES
Armani Bunn (: 1995) is a 31 y.o. female, established patient, here for evaluation of the following chief complaint(s):  Injection (vivitrol injection)       ASSESSMENT/PLAN:  Below is the assessment and plan developed based on review of pertinent history, physical exam, labs, studies, and medications. 1. History of drug abuse in remission [facility]) -- has been doing well. -     naltrexone microspheres (VIVITROL) ER injection 380 mg; 380 mg, IntraMUSCular, ONCE, 1 dose, On 22 at 1000    2. Mixed hyperlipidemia -- has been losing some weight; will check fasting labs at her next visit. Follow up in 1 month    SUBJECTIVE/OBJECTIVE:  HPI    Presents for Vivitrol injection that she receives monthly. Has been doing well with program.  Her contract with [facility] will end in 2023. Her job with [employer] has been stressful but she is managing well. Her cholesterol level has been elevated; she prefers to remain off medication and is eating lower fat diet. She is trying to lose weight.     Patient Active Problem List   Diagnosis Code    Rotator cuff tear M75.100    Recurrent UTI N39.0    Bee sting allergy Z91.030    Anxiety F41.9    FH: kidney disease Z84.1    Severe obesity ([address] E66.01    Mild depression ([address] F32.0     Past Surgical History:   Procedure Laterality Date    HX ROTATOR CUFF REPAIR Right 2019    HX TONSILLECTOMY  2002    MS SHOULDER SURG PROC UNLISTED Right 2010    torn labrum and tendon repair/right     Social History     Socioeconomic History    Marital status: SINGLE     Spouse name: Not on file    Number of children: Not on file    Years of education: Not on file    Highest education level: Not on file   Occupational History    Occupation: [employer]   Tobacco Use    Smoking status: Never Smoker    Smokeless tobacco: Never Used   Vaping Use    Vaping Use: Never used   Substance and Sexual Activity    Alcohol use: Yes     Comment: no ETOH since 12/1/2017    Drug use: Yes     Types: Opiates     Comment: sober since 12/1/2017    Sexual activity: Yes     Birth control/protection: Pill   Other Topics Concern    Not on file   Social History Narrative    Working full time at [employer],[address], 40 hours/week + overtime    Not doing night shifts        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 [employer] 44 Strain:     Difficulty of Paying Living Expenses: Not on file   Food Insecurity:     Worried About [facility] in the Last Year: Not on file    Corine of Food in the Last Year: Not on file   Transportation Needs:     Lack of Transportation (Medical): Not on file    Lack of Transportation (Non-Medical):  Not on file   Physical Activity:     Days of Exercise per Week: Not on file    Minutes of Exercise per Session: Not on file   Stress:     Feeling of Stress : Not on file   Social Connections:     Frequency of Communication with Friends and Family: Not on file    Frequency of Social Gatherings with Friends and Family: Not on file    Attends Cheondoism Services: Not on file    Active Member of [address] [place] [address] or Organizations: Not on file    Attends Club or Organization Meetings: Not on file    Marital Status: Not on file   Intimate Partner Violence:     Fear of Current or Ex-Partner: Not on file    Emotionally Abused: Not on file    Physically Abused: Not on file    Sexually Abused: Not on file   Housing Stability:     Unable to Pay for Housing in the Last Year: Not on file    Number of [place] in the Last Year: Not on file    Unstable Housing in the Last Year: Not on file     Family History   Problem Relation Age of Onset    Anxiety Mother     Elevated Lipids Mother     Kidney Disease Paternal Grandmother         also in cousins    Cancer Maternal Grandmother         skin     Current Outpatient Medications   Medication Sig    VivitroL 380 mg ER injection INJECT 380 MG INTRAMUSCULARLY EVERY MONTH. REFRIGERATE    gabapentin (NEURONTIN) 300 mg capsule Take 1 Capsule by mouth nightly. Max Daily Amount: 300 mg. rx by dr. [facility]    etonogestreL (Nexplanon) 68 mg impl by SubDERmal route.  atomoxetine (Strattera) 40 mg capsule Take 80 mg by mouth daily.  EPINEPHrine (EPIPEN) 0.3 mg/0.3 mL injection     propranolol (INDERAL) 20 mg tablet Take  by mouth as needed. As needed     FLUoxetine (PROZAC) 20 mg tablet Take 4 Tabs by mouth daily. No current facility-administered medications for this visit. Allergies   Allergen Reactions    Bee Sting Kit Anaphylaxis     And wasps    Cefepime Hives and Swelling    Cephalosporins Anaphylaxis    Ciprofloxacin Anaphylaxis    Quinolones Anaphylaxis    Rocephin [Ceftriaxone] Hives    Zithromax [Azithromycin] Hives     Immunization History   Administered Date(s) Administered    COVID-19, Pfizer Purple top, DILUTE for use, 12+ yrs, 30mcg/0.3mL dose 02/02/2021, 02/24/2021, 12/11/2021    DTaP [birth year], 10/31/1995, 05/07/1998    HPV 07/16/2008, 09/19/2008, 01/19/2009    Hep A Vaccine 07/16/2008, 01/19/2009    Hep B Vaccine [birth year]    Influenza High Dose Vaccine PF 10/16/2014    Influenza Vaccine 10/30/2016, 10/01/2017, 10/15/2018    Influenza Vaccine Whole 12/03/2012    MMR 08/09/1995, 05/07/1998    Meningococcal (MCV4) Vaccine 05/31/2012    Meningococcal ACWY Vaccine 03/01/2006    Poliovirus vaccine 1995, 1995, 10/31/1995, 05/07/1998    TB Skin Test (PPD) Intradermal 05/21/2013, 06/02/2014    Tdap 03/01/2006, 11/29/2016         Review of Systems   Respiratory: Negative for cough and shortness of breath. Cardiovascular: Negative for chest pain. Neurological: Negative for dizziness and headaches.      /70 (BP 1 Location: Left upper arm, BP Patient Position: Sitting, BP Cuff Size: Adult)   Pulse 83   Temp 98.1 °F (36.7 °C) (Oral) Resp 14   Ht 5' 1\" (1.549 m)   Wt 211 lb 9.6 oz (96 kg)   SpO2 98%   BMI 39.98 kg/m²   Physical Exam  Vitals and nursing note reviewed. Constitutional:       Appearance: Normal appearance. HENT:      Head: Normocephalic. Pulmonary:      Effort: Pulmonary effort is normal.   Neurological:      General: No focal deficit present. Mental Status: She is alert and oriented to person, place, and time. Psychiatric:         Mood and Affect: Mood normal.         Behavior: Behavior normal.               An electronic signature was used to authenticate this note.   -- Erika Ventura NP

## 2022-03-18 PROBLEM — E66.01 SEVERE OBESITY (HCC): Status: ACTIVE | Noted: 2018-10-29

## 2022-03-20 PROBLEM — F32.A MILD DEPRESSION: Status: ACTIVE | Noted: 2018-11-13

## 2022-03-28 ENCOUNTER — OFFICE VISIT (OUTPATIENT)
Dept: INTERNAL MEDICINE CLINIC | Age: 27
End: 2022-03-28
Payer: COMMERCIAL

## 2022-03-28 VITALS
HEIGHT: 64 IN | DIASTOLIC BLOOD PRESSURE: 82 MMHG | TEMPERATURE: 98.1 F | HEART RATE: 82 BPM | BODY MASS INDEX: 34.83 KG/M2 | SYSTOLIC BLOOD PRESSURE: 120 MMHG | RESPIRATION RATE: 14 BRPM | OXYGEN SATURATION: 98 % | WEIGHT: 204 LBS

## 2022-03-28 DIAGNOSIS — F19.11 HISTORY OF DRUG ABUSE IN REMISSION (HCC): ICD-10-CM

## 2022-03-28 DIAGNOSIS — E78.00 ELEVATED LDL CHOLESTEROL LEVEL: Primary | ICD-10-CM

## 2022-03-28 PROCEDURE — 99213 OFFICE O/P EST LOW 20 MIN: CPT | Performed by: INTERNAL MEDICINE

## 2022-03-28 PROCEDURE — 96372 THER/PROPH/DIAG INJ SC/IM: CPT | Performed by: INTERNAL MEDICINE

## 2022-03-28 NOTE — PROGRESS NOTES
After obtaining consent, and per orders of , injection of Vivtrol given by Lm Harrison LPN. Patient instructed to remain in clinic for 20 minutes afterwards, and to report any adverse reaction to me immediately.

## 2022-03-28 NOTE — PROGRESS NOTES
Diagnoses and all orders for this visit:    1. Elevated LDL cholesterol level  Continues with weight loss and eating a heart healthy diet  We will check labs  -     METABOLIC PANEL, COMPREHENSIVE; Future  -     LIPID PANEL; Future    2. History of drug abuse in remission [facility])  Her program will be completed in January 2023  She started in 2018  -     naltrexone microspheres (VIVITROL) ER injection 380 mg  We will check CMP         Chief Complaint   Patient presents with    Immunization/Injection     No concerns      Right arm pain  She has been following with orthopedics and arterial circulation is good. She has some residual pain and will follow up with Dr. Grace Pablo. She is on gabapentin 300 mg. BMI 35  She continues with intentional weight loss. She has been eating a heart healthy diet and exercising. She does a lot of writing with her horse. She rides for about an hour    History of drug abuse in remission  Continues on naltrexone. She does not have any side effects. She is interested in coming off of medication but this was deferred by organization and she continues with compliance      Past Medical History:   Diagnosis Date    Anxiety 2010    Bee sting allergy     severe. Dr[facility] R[facility]ana [facility]h [facility]: kidney disease 11/24/2012    Lyme disease     Musculoskeletal disorder 2009    Dislocated Rt shoulder    Recurrent UTI     hx pyelo. saw urology.   took macrodantin age [age] year     Past Surgical History:   Procedure Laterality Date    HX ROTATOR CUFF REPAIR Right 05/07/2019    HX TONSILLECTOMY  2002    [place] SHOULDER SURG PROC UNLISTED Right 12/2010    torn labrum and tendon repair/right     Social History     Socioeconomic History    Marital status: SINGLE   Occupational History    Occupation: [employer]   Tobacco Use    Smoking status: Never Smoker    Smokeless tobacco: Never Used   Vaping Use    Vaping Use: Never used   Substance and Sexual Activity    Alcohol use: Yes     Comment: no ETOH since 12/1/2017    Drug use: Yes     Types: Opiates     Comment: sober since 12/1/2017    Sexual activity: Yes     Birth control/protection: Pill   Social History Narrative    Working full time at [address], 40 hours/week + overtime    Not doing night shifts        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 REFRIGERATE 3 Each 4    gabapentin (NEURONTIN) 300 mg capsule Take 1 Capsule by mouth nightly. Max Daily Amount: 300 mg. rx by dr. [facility] 30 Capsule 0    etonogestreL (Nexplanon) 68 mg impl by SubDERmal route.  atomoxetine (Strattera) 40 mg capsule Take 80 mg by mouth daily.  EPINEPHrine (EPIPEN) 0.3 mg/0.3 mL injection       propranolol (INDERAL) 20 mg tablet Take  by mouth as needed. As needed       FLUoxetine (PROZAC) 20 mg tablet Take 4 Tabs by mouth daily.  180 Tab 0     Allergies   Allergen Reactions    Bee Sting Kit Anaphylaxis     And wasps    Cefepime Hives and Swelling    Cephalosporins Anaphylaxis    Ciprofloxacin Anaphylaxis    Quinolones Anaphylaxis    Rocephin [Ceftriaxone] Hives    Zithromax [Azithromycin] Hives       Review of Systems - General ROS: negative for - chills or fever  Cardiovascular ROS: no chest pain or dyspnea on exertion  Respiratory ROS: no cough, shortness of breath, or wheezing    Visit Vitals  /82 (BP 1 Location: Left upper arm, BP Patient Position: Sitting, BP Cuff Size: Adult)   Pulse 82   Temp 98.1 °F (36.7 °C) (Temporal)   Resp 14   Ht 5' 4\" (1.626 m)   Wt 204 lb (92.5 kg)   SpO2 98%   BMI 35.02 kg/m²     Constitutional: [x] Appears well-developed and well-nourished [x] No apparent distress      [] Abnormal -     Mental status: [x] Alert and awake  [x] Oriented to person/place/time [x] Able to follow commands    [] Abnormal -     Eyes:   EOM    [x]  Normal    [] Abnormal -   Sclera  [x]  Normal    [] Abnormal -          Discharge [x]  None visible   [] Abnormal -     HENT: [x] Normocephalic, atraumatic  [] Abnormal -   [x] Mouth/Throat: Mucous membranes are moist    External Ears [x] Normal  [] Abnormal -    Neck: [x] No visualized mass [] Abnormal -     Pulmonary/Chest: [x] Respiratory effort normal   [x] No visualized signs of difficulty breathing or respiratory distress        [] Abnormal -      Musculoskeletal:   [x] Normal gait with no signs of ataxia         [x] Normal range of motion of neck        [] Abnormal -     Neurological:        [x] No Facial Asymmetry (Cranial nerve 7 motor function) (limited exam due to video visit)          [x] No gaze palsy        [] Abnormal -          Skin:        [x] No significant exanthematous lesions or discoloration noted on facial skin         [] Abnormal -            Psychiatric:       [x] Normal Affect [] Abnormal -        [x] No Hallucinations        This medical record was transcribed using an electronic medical records/speech recognition system. Although proofread, it may and can contain electronic, spelling and other errors. Corrections may be executed at a later time. Please contact us for any clarifications as needed.

## 2022-04-27 ENCOUNTER — OFFICE VISIT (OUTPATIENT)
Dept: INTERNAL MEDICINE CLINIC | Age: 27
End: 2022-04-27
Payer: COMMERCIAL

## 2022-04-27 VITALS
TEMPERATURE: 98.4 F | WEIGHT: 204 LBS | DIASTOLIC BLOOD PRESSURE: 87 MMHG | OXYGEN SATURATION: 98 % | BODY MASS INDEX: 38.51 KG/M2 | RESPIRATION RATE: 16 BRPM | SYSTOLIC BLOOD PRESSURE: 120 MMHG | HEIGHT: 61 IN | HEART RATE: 74 BPM

## 2022-04-27 DIAGNOSIS — F19.11 PERSONAL HISTORY OF DRUG ABUSE (HCC): ICD-10-CM

## 2022-04-27 DIAGNOSIS — E78.2 MIXED HYPERLIPIDEMIA: Primary | ICD-10-CM

## 2022-04-27 PROCEDURE — 99213 OFFICE O/P EST LOW 20 MIN: CPT | Performed by: INTERNAL MEDICINE

## 2022-04-27 NOTE — PROGRESS NOTES
After obtaining consent, and per orders of Dr. Laury Bran, injection of Vivtrol given by Drew Wallace LPN. Patient instructed to remain in clinic for 20 minutes afterwards, and to report any adverse reaction to me immediately.

## 2022-04-27 NOTE — PROGRESS NOTES
Diagnoses and all orders for this visit:    1. Mixed hyperlipidemia  Patient has weight loss and eating heart healthy diet  No history of hypertension  We will check lipids  Encouraged continued heart healthy diet  -     METABOLIC PANEL, COMPREHENSIVE; Future  -     LIPID PANEL; Future    2. History of drug abuse in remission ([address]  Stable  Following with psychiatrist  -     naltrexone microspheres (VIVITROL) ER injection 380 mg           Chief Complaint   Patient presents with    Immunization/Injection       History of drug abuse  Patient presents for her Vivitrol injection. She has not had any side effects. 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 She has vacation coming up in September and will go to [place]. Hyperlipidemia  Labs reviewed. She is eating a heart healthy diet and exercising. Past Medical History:   Diagnosis Date    Anxiety 2010    Bee sting allergy     [facility]. [facility] Court Botello [facility]: kidney disease 11/24/2012    Lyme disease     Musculoskeletal disorder 2009    Dislocated Rt shoulder    Recurrent UTI     hx pyelo. saw urology.   took macrodantin age [age] year     Past Surgical History:   Procedure Laterality Date    HX ROTATOR CUFF REPAIR Right 05/07/2019    HX TONSILLECTOMY  2002    [place] SHOULDER SURG PROC UNLISTED Right 12/2010    torn labrum and tendon repair/right     Social History     Socioeconomic History    Marital status: SINGLE   Occupational History    Occupation: [employer]   Tobacco Use    Smoking status: Never Smoker    Smokeless tobacco: Never Used   Vaping Use    Vaping Use: Never used   Substance and Sexual Activity    Alcohol use: Yes     Comment: no ETOH since 12/1/2017    Drug use: Yes     Types: Opiates     Comment: sober since 12/1/2017    Sexual activity: Yes     Birth control/protection: Pill   Social History Narrative    Working full time at [employer],[address], 40 hours/week + overtime    Not doing night shifts        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 REFRIGERATE 3 Each 4    gabapentin (NEURONTIN) 300 mg capsule Take 1 Capsule by mouth nightly. Max Daily Amount: 300 mg. rx by dr. [facility] 30 Capsule 0    etonogestreL (Nexplanon) 68 mg impl by SubDERmal route.  atomoxetine (Strattera) 40 mg capsule Take 80 mg by mouth daily.  EPINEPHrine (EPIPEN) 0.3 mg/0.3 mL injection       FLUoxetine (PROZAC) 20 mg tablet Take 4 Tabs by mouth daily. 180 Tab 0    propranolol (INDERAL) 20 mg tablet Take  by mouth as needed.  As needed        Allergies   Allergen Reactions    Bee Sting Kit Anaphylaxis     And wasps    Cefepime Hives and Swelling    Cephalosporins Anaphylaxis    Ciprofloxacin Anaphylaxis    Quinolones Anaphylaxis    Rocephin [Ceftriaxone] Hives    Zithromax [Azithromycin] Hives       Review of Systems - General ROS: negative for - chills or fever  Cardiovascular ROS: no chest pain or dyspnea on exertion  Respiratory ROS: no cough, shortness of breath, or wheezing    Visit Vitals  /87 (BP 1 Location: Left upper arm, BP Patient Position: Sitting, BP Cuff Size: Adult)   Pulse 74   Temp 98.4 °F (36.9 °C) (Oral)   Resp 16   Ht 5' 1\" (1.549 m)   Wt 204 lb (92.5 kg)   SpO2 98%   BMI 38.55 kg/m²     Constitutional: [x] Appears well-developed and well-nourished [x] No apparent distress      [] Abnormal -     Mental status: [x] Alert and awake  [x] Oriented to person/place/time [x] Able to follow commands    [] Abnormal -     Eyes:   EOM    [x]  Normal    [] Abnormal -   Sclera  [x]  Normal    [] Abnormal -          Discharge [x]  None visible   [] Abnormal -     HENT: [x] Normocephalic, atraumatic  [] Abnormal -   [x] Mouth/Throat: Mucous membranes are moist    External Ears [x] Normal  [] Abnormal -    Neck: [x] No visualized mass [] Abnormal -     Pulmonary/Chest: [x] Respiratory effort normal   [x] No visualized signs of difficulty breathing or respiratory distress        [] Abnormal -      Musculoskeletal:   [x] Normal gait with no signs of ataxia         [x] Normal range of motion of neck        [] Abnormal -     Neurological:        [x] No Facial Asymmetry (Cranial nerve 7 motor function) (limited exam due to video visit)          [x] No gaze palsy        [] Abnormal -          Skin:        [x] No significant exanthematous lesions or discoloration noted on facial skin         [] Abnormal -            Psychiatric:       [x] Normal Affect [] Abnormal -        [x] No Hallucinations        This medical record was transcribed using an electronic medical records/speech recognition system. Although proofread, it may and can contain electronic, spelling and other errors. Corrections may be executed at a later time. Please contact us for any clarifications as needed.

## 2022-05-25 ENCOUNTER — OFFICE VISIT (OUTPATIENT)
Dept: INTERNAL MEDICINE CLINIC | Age: 27
End: 2022-05-25
Payer: COMMERCIAL

## 2022-05-25 VITALS
HEIGHT: 64 IN | TEMPERATURE: 98.1 F | OXYGEN SATURATION: 98 % | HEART RATE: 118 BPM | DIASTOLIC BLOOD PRESSURE: 81 MMHG | SYSTOLIC BLOOD PRESSURE: 117 MMHG | WEIGHT: 207 LBS | RESPIRATION RATE: 16 BRPM | BODY MASS INDEX: 35.34 KG/M2

## 2022-05-25 DIAGNOSIS — F19.11 PERSONAL HISTORY OF DRUG ABUSE (HCC): Primary | ICD-10-CM

## 2022-05-25 PROCEDURE — 96372 THER/PROPH/DIAG INJ SC/IM: CPT | Performed by: INTERNAL MEDICINE

## 2022-05-25 PROCEDURE — 99212 OFFICE O/P EST SF 10 MIN: CPT | Performed by: INTERNAL MEDICINE

## 2022-05-25 NOTE — PROGRESS NOTES
Diagnoses and all orders for this visit:    1. Personal history of drug abuse ([address]  Stable  No medication changes and continues to be abstinent from illicit medications  -     naltrexone microspheres (VIVITROL) ER injection 380 mg           Chief Complaint   Patient presents with    Follow-up       History of drug abuse  Patient reports that she has been stable. No new changes. She has been at work. She plans on going on vacation to the beach for a summer break. Past Medical History:   Diagnosis Date    Anxiety 2010    Bee sting allergy     severe. Dr. Thomas David at [facility]: kidney disease 11/24/2012    Lyme disease     Musculoskeletal disorder 2009    Dislocated Rt shoulder    Personal history of drug abuse ([address]     Recurrent UTI     hx pyelo. saw urology.   took macrodantin age [age] year     Past Surgical History:   Procedure Laterality Date    HX ROTATOR CUFF REPAIR Right 05/07/2019    HX TONSILLECTOMY  2002    AK SHOULDER SURG PROC UNLISTED Right 12/2010    torn labrum and tendon repair/right     Social History     Socioeconomic History    Marital status: SINGLE   Occupational History    Occupation: [employer]   Tobacco Use    Smoking status: Never Smoker    Smokeless tobacco: Never Used   Vaping Use    Vaping Use: Never used   Substance and Sexual Activity    Alcohol use: Yes     Comment: no ETOH since 12/1/2017    Drug use: Yes     Types: Opiates     Comment: sober since 12/1/2017    Sexual activity: Yes     Birth control/protection: Pill   Social History Narrative    Working full time at [employer] [employer],[address], 40 hours/week + overtime    Not doing night shifts        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 REFRIGERATE 3 Each 4    gabapentin (NEURONTIN) 300 mg capsule Take 1 Capsule by mouth nightly. Max Daily Amount: 300 mg. rx by dr. Lila [facility]opedics 30 Capsule 0    etonogestreL (Nexplanon) 68 mg impl by SubDERmal route.  atomoxetine (Strattera) 40 mg capsule Take 80 mg by mouth daily.  EPINEPHrine (EPIPEN) 0.3 mg/0.3 mL injection       propranolol (INDERAL) 20 mg tablet Take  by mouth as needed. As needed       FLUoxetine (PROZAC) 20 mg tablet Take 4 Tabs by mouth daily.  180 Tab 0     Allergies   Allergen Reactions    Bee Sting Kit Anaphylaxis     And wasps    Cefepime Hives and Swelling    Cephalosporins Anaphylaxis    Ciprofloxacin Anaphylaxis    Quinolones Anaphylaxis    Rocephin [Ceftriaxone] Hives    Zithromax [Azithromycin] Hives       Review of Systems - General ROS: negative for - chills or fever  Cardiovascular ROS: no chest pain or dyspnea on exertion  Respiratory ROS: no cough, shortness of breath, or wheezing    Visit Vitals  /81 (BP 1 Location: Left upper arm, BP Patient Position: Sitting, BP Cuff Size: Adult)   Pulse (!) 118   Temp 98.1 °F (36.7 °C) (Oral)   Resp 16   Ht 5' 4\" (1.626 m)   Wt 207 lb (93.9 kg)   SpO2 98%   BMI 35.53 kg/m²     Constitutional: [x] Appears well-developed and well-nourished [x] No apparent distress      [] Abnormal -     Mental status: [x] Alert and awake  [x] Oriented to person/place/time [x] Able to follow commands    [] Abnormal -     Eyes:   EOM    [x]  Normal    [] Abnormal -   Sclera  [x]  Normal    [] Abnormal -          Discharge [x]  None visible   [] Abnormal -     HENT: [x] Normocephalic, atraumatic  [] Abnormal -   [x] Mouth/Throat: Mucous membranes are moist    External Ears [x] Normal  [] Abnormal -    Neck: [x] No visualized mass [] Abnormal -     Pulmonary/Chest: [x] Respiratory effort normal   [x] No visualized signs of difficulty breathing or respiratory distress        [] Abnormal -      Musculoskeletal:   [x] Normal gait with no signs of ataxia         [x] Normal range of motion of neck        [] Abnormal -     Neurological:        [x] No Facial Asymmetry (Cranial nerve 7 motor function) (limited exam due to video visit)          [x] No gaze palsy        [] Abnormal -          Skin:        [x] No significant exanthematous lesions or discoloration noted on facial skin         [] Abnormal -            Psychiatric:       [x] Normal Affect [] Abnormal -        [x] No Hallucinations      ATTENTION:   This medical record was transcribed using an electronic medical records/speech recognition system. Although proofread, it may and can contain electronic, spelling and other errors. Corrections may be executed at a later time. Please contact us for any clarifications as needed. Kaela Diane

## 2022-05-25 NOTE — PROGRESS NOTES
Patient present for routine iinjection. Pt denies any symptoms , reactions or allergies that would exclude them from being injected today. Risks and adverse reactions were discussed and the injection was given to them. All questions were addressed. Pt was observed for 10 min post injection. There were no reactions observed.     Kevon Almendarez LPN

## 2022-06-15 ENCOUNTER — TELEPHONE (OUTPATIENT)
Dept: INTERNAL MEDICINE CLINIC | Age: 27
End: 2022-06-15

## 2022-06-15 NOTE — TELEPHONE ENCOUNTER
Patient called to state that our office needs to call [phone] to confirm they can mail her Vivitrol to her.

## 2022-06-22 ENCOUNTER — OFFICE VISIT (OUTPATIENT)
Dept: INTERNAL MEDICINE CLINIC | Age: 27
End: 2022-06-22
Payer: COMMERCIAL

## 2022-06-22 VITALS
TEMPERATURE: 98.8 F | OXYGEN SATURATION: 98 % | SYSTOLIC BLOOD PRESSURE: 124 MMHG | HEIGHT: 64 IN | DIASTOLIC BLOOD PRESSURE: 87 MMHG | WEIGHT: 204 LBS | BODY MASS INDEX: 34.83 KG/M2 | RESPIRATION RATE: 16 BRPM | HEART RATE: 121 BPM

## 2022-06-22 DIAGNOSIS — M79.2 NERVE PAIN: ICD-10-CM

## 2022-06-22 DIAGNOSIS — F19.10 SUBSTANCE ABUSE (HCC): Primary | ICD-10-CM

## 2022-06-22 DIAGNOSIS — R00.0 TACHYCARDIA: ICD-10-CM

## 2022-06-22 PROCEDURE — 96372 THER/PROPH/DIAG INJ SC/IM: CPT | Performed by: INTERNAL MEDICINE

## 2022-06-22 PROCEDURE — 99213 OFFICE O/P EST LOW 20 MIN: CPT | Performed by: INTERNAL MEDICINE

## 2022-06-22 RX ORDER — NALTREXONE 380 MG
KIT INTRAMUSCULAR
Qty: 3 EACH | Refills: 4 | Status: SHIPPED | OUTPATIENT
Start: 2022-06-22 | End: 2022-07-25

## 2022-06-22 RX ORDER — GABAPENTIN 300 MG/1
300 CAPSULE ORAL 2 TIMES DAILY
Qty: 30 CAPSULE | Refills: 0
Start: 2022-06-22

## 2022-06-22 NOTE — PROGRESS NOTES
Patient present for routine immunizations. Pt denies any symptoms , reactions or allergies that would exclude them from being immunized today. Risks and adverse reactions were discussed and the VIS was given to them. All questions were addressed. Pt was observed for 10 min post injection. There were no reactions observed.     Feli Lomas

## 2022-06-22 NOTE — PROGRESS NOTES
Diagnoses and all orders for this visit:    1. Substance abuse ([facility] [address]  Compliant with [facility]  Program  No issues with naltrexone  Would like to recheck her liver on naltrexone for over a year. She is aware  -     naltrexone microspheres (VivitroL) 380 mg ER injection; Give Vivitrol 380 mg IM  -     naltrexone microspheres (VIVITROL) ER injection 380 mg    2. Nerve pain  Stable  -     gabapentin (NEURONTIN) 300 mg capsule; Take 1 Capsule by mouth two (2) times a day. Max Daily Amount: 600 mg. rx by dr. Selina Samuels orthopedics    3. Tachycardia  Notes related to her anxiety and attention deficit. She will have attention deficit evaluation formal  We will check thyroid  We will decrease caffeine  -     TSH 3RD GENERATION; Future  -     T4, FREE; Future           Chief Complaint   Patient presents with    Immunization/Injection       History of substance abuse  Patient presents for her Vivitrol injection. She has been compliant with the program.  No issues abuse. She is also being followed by psychiatry. No change in her medicines. She is working at facility and doing well      Tachycardia  She reports her anxiety is not optimally controlled. She is being worked up for attention deficit as well. She had some coffee yesterday. Denies palpitations or shortness of breath. Past Medical History:   Diagnosis Date    Anxiety 2010    Bee sting allergy     severe. Dr. Amanda Aaron at [facility]: kidney disease 11/24/2012    Lyme disease     Musculoskeletal disorder 2009    Dislocated Rt shoulder    Personal history of drug abuse ([facility] [address]     Recurrent UTI     hx pyelo. saw urology.   took macrodantin age 16 3 year     Past Surgical History:   Procedure Laterality Date    HX ROTATOR CUFF REPAIR Right 05/07/2019    HX TONSILLECTOMY  2002    [place] SHOULDER SURG PROC UNLISTED Right 12/2010    torn labrum and tendon repair/right     Social History     Socioeconomic History    Marital status: SINGLE   Occupational History    Occupation: [employer]   Tobacco Use    Smoking status: Never Smoker    Smokeless tobacco: Never Used   Vaping Use    Vaping Use: Never used   Substance and Sexual Activity    Alcohol use: Yes     Comment: no ETOH since 12/1/2017    Drug use: Yes     Types: Opiates     Comment: sober since 12/1/2017    Sexual activity: Yes     Birth control/protection: Pill   Social History Narrative    Working full time at [employer], 40 hours/week + overtime    Not doing night shifts        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 Max Daily Amount: 600 mg. rx by dr. Shaun Zarate orthopedics 30 Capsule 0    naltrexone microspheres (VivitroL) 380 mg ER injection Give Vivitrol 380 mg IM 3 Each 4    etonogestreL (Nexplanon) 68 mg impl by SubDERmal route.  atomoxetine (Strattera) 40 mg capsule Take 80 mg by mouth daily.  EPINEPHrine (EPIPEN) 0.3 mg/0.3 mL injection       propranolol (INDERAL) 20 mg tablet Take  by mouth as needed. As needed       FLUoxetine (PROZAC) 20 mg tablet Take 4 Tabs by mouth daily.  180 Tab 0     Allergies   Allergen Reactions    Bee Sting Kit Anaphylaxis     And wasps    Cefepime Hives and Swelling    Cephalosporins Anaphylaxis    Ciprofloxacin Anaphylaxis    Quinolones Anaphylaxis    Rocephin [Ceftriaxone] Hives    Zithromax [Azithromycin] Hives       Review of Systems - General ROS: negative for - chills or fever  Cardiovascular ROS: no chest pain or dyspnea on exertion  Respiratory ROS: no cough, shortness of breath, or wheezing    Visit Vitals  /87 (BP 1 Location: Left upper arm, BP Patient Position: Sitting, BP Cuff Size: Adult)   Pulse (!) 121   Temp 98.8 °F (37.1 °C) (Oral)   Resp 16   Ht 5' 4\" (1.626 m)   Wt 204 lb (92.5 kg)   SpO2 98%   BMI 35.02 kg/m²     Constitutional: [x] Appears well-developed and well-nourished [x] No apparent distress      [] Abnormal -     Mental status: [x] Alert and awake  [x] Oriented to person/place/time [x] Able to follow commands    [] Abnormal -     Eyes:   EOM    [x]  Normal    [] Abnormal -   Sclera  [x]  Normal    [] Abnormal -          Discharge [x]  None visible   [] Abnormal -     HENT: [x] Normocephalic, atraumatic  [] Abnormal -   [x] Mouth/Throat: Mucous membranes are moist    External Ears [x] Normal  [] Abnormal -    Neck: [x] No visualized mass [] Abnormal -     Pulmonary/Chest: [x] Respiratory effort normal   [x] No visualized signs of difficulty breathing or respiratory distress        [] Abnormal -      Musculoskeletal:   [x] Normal gait with no signs of ataxia         [x] Normal range of motion of neck        [] Abnormal -     Neurological:        [x] No Facial Asymmetry (Cranial nerve 7 motor function) (limited exam due to video visit)          [x] No gaze palsy        [] Abnormal -          Skin:        [x] No significant exanthematous lesions or discoloration noted on facial skin         [] Abnormal -            Psychiatric:       [x] Normal Affect [] Abnormal -        [x] No Hallucinations      ATTENTION:   This medical record was transcribed using an electronic medical records/speech recognition system. Although proofread, it may and can contain electronic, spelling and other errors. Corrections may be executed at a later time. Please contact us for any clarifications as needed.

## 2022-07-14 ENCOUNTER — TELEPHONE (OUTPATIENT)
Dept: INTERNAL MEDICINE CLINIC | Age: 27
End: 2022-07-14

## 2022-07-14 NOTE — TELEPHONE ENCOUNTER
Spoke with [facility] speciality pharmacy to confirm delivery of pts medication, it will arrive on 7/19/22

## 2022-07-14 NOTE — TELEPHONE ENCOUNTER
----- Message from Jurgen Louis sent at 7/13/2022  4:03 PM EDT -----  Subject: Message to Provider    QUESTIONS  Information for Provider? Erin Dukes from [facility] needs to verify delivery of PT's   medication Vivitrol 380mg vile to [id number] [facility] on July 19th with signature required, Please reach out to any  that   answers for questions or concerns. [phone]  ---------------------------------------------------------------------------  --------------  Earle Linares INFO  [phone]; OK to leave message on voicemail  ---------------------------------------------------------------------------  --------------  SCRIPT ANSWERS  Relationship to Patient? Third Party  Third Party Type? Pharmacy? Representative Name?  Erin Dukes

## 2022-07-25 DIAGNOSIS — F19.10 SUBSTANCE ABUSE (HCC): ICD-10-CM

## 2022-07-25 RX ORDER — NALTREXONE 380 MG
KIT INTRAMUSCULAR
Qty: 4 EACH | Refills: 4 | Status: SHIPPED | OUTPATIENT
Start: 2022-07-25

## 2022-07-28 ENCOUNTER — OFFICE VISIT (OUTPATIENT)
Dept: INTERNAL MEDICINE CLINIC | Age: 27
End: 2022-07-28
Payer: COMMERCIAL

## 2022-07-28 VITALS
WEIGHT: 205.2 LBS | DIASTOLIC BLOOD PRESSURE: 79 MMHG | TEMPERATURE: 97.9 F | HEIGHT: 64 IN | RESPIRATION RATE: 14 BRPM | HEART RATE: 109 BPM | SYSTOLIC BLOOD PRESSURE: 113 MMHG | BODY MASS INDEX: 35.03 KG/M2 | OXYGEN SATURATION: 96 %

## 2022-07-28 DIAGNOSIS — F19.10 SUBSTANCE ABUSE (HCC): Primary | ICD-10-CM

## 2022-07-28 DIAGNOSIS — F32.89 OTHER DEPRESSION: ICD-10-CM

## 2022-07-28 DIAGNOSIS — F19.11 HISTORY OF DRUG ABUSE IN REMISSION (HCC): ICD-10-CM

## 2022-07-28 PROCEDURE — 96372 THER/PROPH/DIAG INJ SC/IM: CPT | Performed by: NURSE PRACTITIONER

## 2022-07-28 PROCEDURE — 99213 OFFICE O/P EST LOW 20 MIN: CPT | Performed by: NURSE PRACTITIONER

## 2022-07-28 RX ORDER — DULOXETIN HYDROCHLORIDE 60 MG/1
60 CAPSULE, DELAYED RELEASE ORAL DAILY
COMMUNITY

## 2022-07-28 NOTE — PROGRESS NOTES
Isabel Chu (: 1995) is a 28 y.o. female, established patient, here for evaluation of the following chief complaint(s):  Injection (vivitrol)       ASSESSMENT/PLAN:  Below is the assessment and plan developed based on review of pertinent history, physical exam, labs, studies, and medications. 1. Substance abuse ([facility] [address]  -     naltrexone microspheres (VIVITROL) ER injection 380 mg; 380 mg, IntraMUSCular, ONCE, 1 dose, On Thu 22 at 1400    2. History of drug abuse in remission ([facility])  -     naltrexone microspheres (VIVITROL) ER injection 380 mg; 380 mg, IntraMUSCular, ONCE, 1 dose, On Thu 22 at 1400    3. Other depression -- weaning off Prozac and will be starting Cymbalta 30 mg daily. Followed by Psychiatry      Follow up in 1 month for Vivitrol injection. SUBJECTIVE/OBJECTIVE:  HPI    Patient of Dr Alcon Benítez who presents for monthly Vivitrol injection. Continues to be abstinent from illicit drug use. Has been having more issues with anxiety and her psychiatrist is weaning her off of Prozac and will start her on Cymbalta 30 mg daily. Needs to have her labs drawn from April and  and can have these done fasting tomorrow. Tends not to tolerate hot climate well and can get lightheaded at times. Patient Active Problem List   Diagnosis Code    Rotator cuff tear M75.100    Recurrent UTI N39.0    Bee sting allergy Z91.030    Anxiety F41.9    FH: kidney disease Z84.1    Severe obesity ([facility] [address] E66.01    Mild depression F32. A     Past Surgical History:   Procedure Laterality Date    HX ROTATOR CUFF REPAIR Right 2019    HX TONSILLECTOMY      [place] SHOULDER SURG PROC UNLISTED Right 2010    torn labrum and tendon repair/right     Social History     Socioeconomic History    Marital status: SINGLE     Spouse name: Not on file    Number of children: Not on file    Years of education: Not on file    Highest education level: Not on file   Occupational History    Occupation: [employer]   Tobacco Use    Smoking status: Never    Smokeless tobacco: Never   Vaping Use    Vaping Use: Never used   Substance and Sexual Activity    Alcohol use: Yes     Comment: no ETOH since 12/1/2017    Drug use: Yes     Types: Opiates     Comment: sober since 12/1/2017    Sexual activity: Yes     Birth control/protection: Pill   Other Topics Concern    Not on file   Social History Narrative    Working full time at [employer],[address], 40 hours/week + overtime    Not doing night shifts        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 [employer] Strain: Not on file   Food Insecurity: Not on file   Transportation Needs: Not on file   Physical Activity: Not on file   Stress: Not on file   Social Connections: Not on file   Intimate Partner Violence: Not on file   Housing Stability: Not on file     Family History   Problem Relation Age of Onset    Anxiety Mother     Elevated Lipids Mother     Kidney Disease Paternal Grandmother         also in cousins    Cancer Maternal Grandmother         skin     Current Outpatient Medications   Medication Sig    DULoxetine (Cymbalta) 30 mg capsule Take 30 mg by mouth in the morning. naltrexone microspheres (VivitroL) 380 mg ER injection INJECT 380MG INTRAMUSCULARLY EVERY 28 DAYS OR MONTHLY    gabapentin (NEURONTIN) 300 mg capsule Take 1 Capsule by mouth two (2) times a day. Max Daily Amount: 600 mg. rx by dr. Brianne Yung orthopedics    etonogestreL (Nexplanon) 68 mg impl by SubDERmal route. atomoxetine (STRATTERA) 40 mg capsule Take 80 mg by mouth daily. EPINEPHrine (EPIPEN) 0.3 mg/0.3 mL injection     propranolol (INDERAL) 20 mg tablet Take  by mouth as needed. As needed     FLUoxetine (PROZAC) 20 mg tablet Take 4 Tabs by mouth daily. No current facility-administered medications for this visit.      Allergies   Allergen Reactions    Bee Sting Kit Anaphylaxis     And wasps    Cefepime Hives and Swelling Cephalosporins Anaphylaxis    Ciprofloxacin Anaphylaxis    Quinolones Anaphylaxis    Rocephin [Ceftriaxone] Hives    Zithromax [Azithromycin] Hives     Immunization History   Administered Date(s) Administered    COVID-19, PFIZER PURPLE top, DILUTE for use, (age 15 y+), IM, 30mcg/0.3mL 02/02/2021, 02/24/2021, 12/11/2021    DTaP 1995, [birth year], 10/31/1995, 05/07/1998    HPV 07/16/2008, 09/19/2008, 01/19/2009    Hep A Vaccine 07/16/2008, 01/19/2009    Hep B Vaccine 1995, [birth year]    Influenza High Dose Vaccine PF 10/16/2014    Influenza Vaccine 10/30/2016, 10/01/2017, 10/15/2018    Influenza Vaccine Whole 12/03/2012    MMR 08/09/1995, 05/07/1998    Meningococcal (MCV4) Vaccine 05/31/2012    Meningococcal ACWY Vaccine 03/01/2006    Poliovirus vaccine 1995, 1995, 10/31/1995, 05/07/1998    TB Skin Test (PPD) Intradermal 05/21/2013, 06/02/2014    Tdap 03/01/2006, 11/29/2016        Review of Systems   Constitutional:  Negative for chills and fever. Respiratory:  Negative for cough and shortness of breath. Cardiovascular:  Negative for chest pain. Neurological:  Negative for headaches. Psychiatric/Behavioral:  Positive for dysphoric mood. /79 (BP 1 Location: Left upper arm, BP Patient Position: Sitting, BP Cuff Size: Small adult)   Pulse (!) 109   Temp 97.9 °F (36.6 °C) (Oral)   Resp 14   Ht 5' 4\" (1.626 m)   Wt 205 lb 3.2 oz (93.1 kg)   SpO2 96%   BMI 35.22 kg/m²    Physical Exam  Vitals and nursing note reviewed. Constitutional:       General: She is not in acute distress. Appearance: Normal appearance. HENT:      Head: Normocephalic. Cardiovascular:      Rate and Rhythm: Regular rhythm. Tachycardia present. Pulmonary:      Effort: Pulmonary effort is normal.      Breath sounds: Normal breath sounds. No wheezing or rhonchi. Skin:     General: Skin is warm and dry. Neurological:      General: No focal deficit present.       Mental Status: She is alert and oriented to person, place, and time. Psychiatric:         Mood and Affect: Mood normal.         Behavior: Behavior normal.             An electronic signature was used to authenticate this note.   -- Honey Cabrera NP

## 2022-07-28 NOTE — PROGRESS NOTES
After obtaining consent, and per orders of EMY Chapin, injection of Vivitrol  given by Yasmine Mendez LPN. Patient instructed to remain in clinic for 20 minutes afterwards, and to report any adverse reaction to me immediately.

## 2022-08-29 ENCOUNTER — OFFICE VISIT (OUTPATIENT)
Dept: INTERNAL MEDICINE CLINIC | Age: 27
End: 2022-08-29
Payer: COMMERCIAL

## 2022-08-29 VITALS
DIASTOLIC BLOOD PRESSURE: 84 MMHG | HEIGHT: 64 IN | SYSTOLIC BLOOD PRESSURE: 126 MMHG | WEIGHT: 210 LBS | RESPIRATION RATE: 16 BRPM | HEART RATE: 87 BPM | TEMPERATURE: 98 F | OXYGEN SATURATION: 98 % | BODY MASS INDEX: 35.85 KG/M2

## 2022-08-29 DIAGNOSIS — F32.89 OTHER DEPRESSION: Primary | ICD-10-CM

## 2022-08-29 DIAGNOSIS — E78.2 MIXED HYPERLIPIDEMIA: ICD-10-CM

## 2022-08-29 DIAGNOSIS — F19.11 HISTORY OF DRUG ABUSE IN REMISSION (HCC): ICD-10-CM

## 2022-08-29 PROCEDURE — 99213 OFFICE O/P EST LOW 20 MIN: CPT | Performed by: INTERNAL MEDICINE

## 2022-08-29 PROCEDURE — 96372 THER/PROPH/DIAG INJ SC/IM: CPT | Performed by: INTERNAL MEDICINE

## 2022-08-29 NOTE — PROGRESS NOTES
After obtaining consent, and per orders of , injection of Vivitrol 380mg/vial given by Magnolia Chavez LPN. Patient instructed to remain in clinic for 20 minutes afterwards, and to report any adverse reaction to me immediately.

## 2022-08-29 NOTE — PROGRESS NOTES
Diagnoses and all orders for this visit:    1. Other depression  Followed by Dr. Michelle East. Tolerating transition to Cymbalta weaning off Prozac  Discussion looking forward to transitioning to psych NP program/lots of support    2. History of drug abuse in remission [facility])  Vivitrol given by Nichole  We need to check her labs she will do these tomorrow    3. Mixed hyperlipidemia  Continue heart healthy diet and exercise      Chief Complaint   Patient presents with    Immunization/Injection     Inj - monthly     Hpmp will be done January 2023. She is looking forward to this. She would like to start the process of her NP psych program after January    Depression  Followed by Dr. Kenneth Jerez  Transitioned down prozac down to 20 mg and started  cymbalta 60 mg   Tolerateing transition  They have been getting a few more supervisors to help       History of drug abuse in remission  She has been doing well. She is quite stable. Hyperlipidemia  Will get labs tomorrow  Past Medical History:   Diagnosis Date    Anxiety 2010    Bee sting allergy     severe. Dr. Bryant Rivera at [facility]    FH: kidney disease 11/24/2012    Lyme disease     Musculoskeletal disorder 2009    Dislocated Rt shoulder    Personal history of drug abuse ([facility] [address]     Recurrent UTI     hx pyelo. saw urology.   took macrodantin age 16 3 year     Past Surgical History:   Procedure Laterality Date    HX ROTATOR CUFF REPAIR Right 05/07/2019    HX TONSILLECTOMY  2002    [place] SHOULDER SURG PROC UNLISTED Right 12/2010    torn labrum and tendon repair/right     Social History     Socioeconomic History    Marital status: SINGLE   Occupational History    Occupation: "[employer]   Tobacco Use    Smoking status: Never    Smokeless tobacco: Never   Vaping Use    Vaping Use: Never used   Substance and Sexual Activity    Alcohol use: Yes     Comment: no ETOH since 12/1/2017    Drug use: Yes     Types: Opiates     Comment: sober since 12/1/2017    Sexual activity: Yes     Birth control/protection: Pill   Social History Narrative    Working full time at [employer] [employer],[address], 40 hours/week + overtime    Not doing night shifts        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 naltrexone microspheres (VivitroL) 380 mg ER injection INJECT 380MG INTRAMUSCULARLY EVERY 28 DAYS OR MONTHLY 4 Each 4    gabapentin (NEURONTIN) 300 mg capsule Take 1 Capsule by mouth two (2) times a day. Max Daily Amount: 600 mg. rx by dr. Juliette Beasley orthopedics 30 Capsule 0    etonogestreL (Nexplanon) 68 mg impl by SubDERmal route. atomoxetine (STRATTERA) 40 mg capsule Take 80 mg by mouth daily. EPINEPHrine (EPIPEN) 0.3 mg/0.3 mL injection       propranolol (INDERAL) 20 mg tablet Take  by mouth as needed. As needed       FLUoxetine (PROZAC) 20 mg tablet Take 4 Tabs by mouth daily.  180 Tab 0     Allergies   Allergen Reactions    Bee Sting Kit Anaphylaxis     And wasps    Cefepime Hives and Swelling    Cephalosporins Anaphylaxis    Ciprofloxacin Anaphylaxis    Quinolones Anaphylaxis    Rocephin [Ceftriaxone] Hives    Zithromax [Azithromycin] Hives         Visit Vitals  /84 (BP 1 Location: Left upper arm, BP Patient Position: Sitting, BP Cuff Size: Adult)   Pulse 87   Temp 98 °F (36.7 °C) (Oral)   Resp 16   Ht 5' 4\" (1.626 m)   Wt 210 lb (95.3 kg)   SpO2 98%   BMI 36.05 kg/m²     Constitutional: [x] Appears well-developed and well-nourished [x] No apparent distress      [] Abnormal -     Mental status: [x] Alert and awake  [x] Oriented to person/place/time [x] Able to follow commands    [] Abnormal -     Eyes:   EOM    [x]  Normal    [] Abnormal -   Sclera  [x] Normal    [] Abnormal -          Discharge [x]  None visible   [] Abnormal -     HENT: [x] Normocephalic, atraumatic  [] Abnormal -   [x] Mouth/Throat: Mucous membranes are moist    External Ears [x] Normal  [] Abnormal -    Neck: [x] No visualized mass [] Abnormal -     Pulmonary/Chest: [x] Respiratory effort normal   [x] No visualized signs of difficulty breathing or respiratory distress        [] Abnormal -      Musculoskeletal:   [x] Normal gait with no signs of ataxia         [x] Normal range of motion of neck        [] Abnormal -     Neurological:        [x] No Facial Asymmetry (Cranial nerve 7 motor function) (limited exam due to video visit)          [x] No gaze palsy        [] Abnormal -          Skin:        [x] No significant exanthematous lesions or discoloration noted on facial skin         [] Abnormal -            Psychiatric:       [x] Normal Affect [] Abnormal -        [x] No Hallucinations      This medical record was transcribed using an electronic medical records/speech recognition system. Although proofread, it may and can contain electronic, spelling and other errors. Corrections may be executed at a later time. Please contact us for any clarifications as needed.

## 2022-09-26 ENCOUNTER — OFFICE VISIT (OUTPATIENT)
Dept: INTERNAL MEDICINE CLINIC | Age: 27
End: 2022-09-26
Payer: COMMERCIAL

## 2022-09-26 VITALS
HEART RATE: 98 BPM | WEIGHT: 210 LBS | SYSTOLIC BLOOD PRESSURE: 105 MMHG | DIASTOLIC BLOOD PRESSURE: 74 MMHG | BODY MASS INDEX: 35.85 KG/M2 | RESPIRATION RATE: 14 BRPM | OXYGEN SATURATION: 97 % | TEMPERATURE: 97.9 F | HEIGHT: 64 IN

## 2022-09-26 DIAGNOSIS — Z11.59 NEED FOR HEPATITIS C SCREENING TEST: ICD-10-CM

## 2022-09-26 DIAGNOSIS — F19.11 HISTORY OF DRUG ABUSE IN REMISSION (HCC): ICD-10-CM

## 2022-09-26 DIAGNOSIS — E78.2 MIXED HYPERLIPIDEMIA: Primary | ICD-10-CM

## 2022-09-26 PROCEDURE — 99213 OFFICE O/P EST LOW 20 MIN: CPT | Performed by: INTERNAL MEDICINE

## 2022-09-26 NOTE — PROGRESS NOTES
Diagnoses and all orders for this visit:    1. Mixed hyperlipidemia  Has not had labs yet  Discussed with patient and she will do just came from vacation  Elevated LDL/Mediterranean heart healthy diet-     METABOLIC PANEL, COMPREHENSIVE; Future  -     LIPID PANEL; Future    2. Need for hepatitis C screening test  Due for screening-     HEPATITIS C AB; Future    3. BMI 36.0-36.9,adult  Slight increase  Has been on Strattera  Continue to monitor  If interested in weight loss consider Saxenda-     TSH 3RD GENERATION; Future  -     T4, FREE; Future    4. History of drug abuse in remission [facility])  Continue meds  Has transition from Prozac to Cymbalta doing well on this  Follow-up with psychiatry-     naltrexone microspheres (VIVITROL) ER injection 380 mg         Chief Complaint   Patient presents with    Injection     History of drug abuse in remission  Patient reports she has been doing well. She just got back from vacation. She was vacationing with her family in [place] with her brother. Her parents travel to [facility] while she and her brother went back to work. She has transitioned off Prozac and weaned over time. She is currently on Cymbalta 60 mg. No side effects    Hyperlipidemia  As noted just came from vacation. No chest pain or shortness of breath. Has not had labs yet. Past Medical History:   Diagnosis Date    Anxiety 2010    Bee sting allergy     severe. Dr. Mehran Hansen at [facility]    FH: kidney disease 11/24/2012    Lyme disease     Musculoskeletal disorder 2009    Dislocated Rt shoulder    Personal history of drug abuse ([address]     Recurrent UTI     hx pyelo. saw urology.   took macrodantin age 16 3 year     Past Surgical History:   Procedure Laterality Date    HX ROTATOR CUFF REPAIR Right 05/07/2019    HX TONSILLECTOMY  2002    VA SHOULDER SURG PROC UNLISTED Right 12/2010    torn labrum and tendon repair/right     Social History     Socioeconomic History    Marital status: SINGLE   Occupational History Occupation: [employer]   Tobacco Use    Smoking status: Never    Smokeless tobacco: Never   Vaping Use    Vaping Use: Never used   Substance and Sexual Activity    Alcohol use: Yes     Comment: no ETOH since 12/1/2017    Drug use: Yes     Types: Opiates     Comment: sober since 12/1/2017    Sexual activity: Yes     Birth control/protection: Pill   Social History Narrative    Working full time at [email or web address], 40 hours/week + overtime    Not doing night shifts        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 naltrexone microspheres (VivitroL) 380 mg ER injection INJECT 380MG INTRAMUSCULARLY EVERY 28 DAYS OR MONTHLY 4 Each 4    gabapentin (NEURONTIN) 300 mg capsule Take 1 Capsule by mouth two (2) times a day. Max Daily Amount: 600 mg. rx by dr. [facility] 30 Capsule 0    etonogestreL (Nexplanon) 68 mg impl by SubDERmal route. atomoxetine (STRATTERA) 40 mg capsule Take 80 mg by mouth daily. EPINEPHrine (EPIPEN) 0.3 mg/0.3 mL injection       propranolol (INDERAL) 20 mg tablet Take  by mouth as needed.  As needed        Allergies   Allergen Reactions    Bee Sting Kit Anaphylaxis     And wasps    Cefepime Hives and Swelling    Cephalosporins Anaphylaxis    Ciprofloxacin Anaphylaxis    Quinolones Anaphylaxis    Rocephin [Ceftriaxone] Hives    Zithromax [Azithromycin] Hives       Review of Systems - General ROS: negative for - chills or fever  Cardiovascular ROS: no chest pain or dyspnea on exertion  Respiratory ROS: no cough, shortness of breath, or wheezing    Visit Vitals  /74 (BP 1 Location: Left upper arm, BP Patient Position: Sitting, BP Cuff Size: Small adult)   Pulse 98   Temp 97.9 °F (36.6 °C) (Oral)   Resp 14   Ht 5' 4\" (1.626 m)   Wt 210 lb (95.3 kg)   SpO2 97%   BMI 36.05 kg/m²     Constitutional: [x] Appears well-developed and well-nourished [x] No apparent distress      [] Abnormal -     Mental status: [x] Alert and awake  [x] Oriented to person/place/time [x] Able to follow commands    [] Abnormal -     Eyes:   EOM    [x]  Normal    [] Abnormal -   Sclera  [x]  Normal    [] Abnormal -          Discharge [x]  None visible   [] Abnormal -     HENT: [x] Normocephalic, atraumatic  [] Abnormal -   [x] Mouth/Throat: Mucous membranes are moist    External Ears [x] Normal  [] Abnormal -    Neck: [x] No visualized mass [] Abnormal -     Pulmonary/Chest: [x] Respiratory effort normal   [x] No visualized signs of difficulty breathing or respiratory distress        [] Abnormal -      Musculoskeletal:   [x] Normal gait with no signs of ataxia         [x] Normal range of motion of neck        [] Abnormal -     Neurological:        [x] No Facial Asymmetry (Cranial nerve 7 motor function) (limited exam due to video visit)          [x] No gaze palsy        [] Abnormal -          Skin:        [x] No significant exanthematous lesions or discoloration noted on facial skin         [] Abnormal -            Psychiatric:       [x] Normal Affect [] Abnormal -        [x] No Hallucinations      This medical record was transcribed using an electronic medical records/speech recognition system. Although proofread, it may and can contain electronic, spelling and other errors. Corrections may be executed at a later time. Please contact us for any clarifications as needed.

## 2022-09-26 NOTE — PROGRESS NOTES
After obtaining consent, and per orders of Dr. Prince Golden, injection of Vivtitrol 380mg/vial given by Daisha Obrien LPN. Patient instructed to remain in clinic for 20 minutes afterwards, and to report any adverse reaction to me immediately.

## 2022-11-01 ENCOUNTER — OFFICE VISIT (OUTPATIENT)
Dept: INTERNAL MEDICINE CLINIC | Age: 27
End: 2022-11-01
Payer: COMMERCIAL

## 2022-11-01 VITALS
HEART RATE: 93 BPM | TEMPERATURE: 98.2 F | SYSTOLIC BLOOD PRESSURE: 115 MMHG | WEIGHT: 212 LBS | BODY MASS INDEX: 36.19 KG/M2 | DIASTOLIC BLOOD PRESSURE: 83 MMHG | HEIGHT: 64 IN | RESPIRATION RATE: 18 BRPM | OXYGEN SATURATION: 93 %

## 2022-11-01 DIAGNOSIS — E78.2 MIXED HYPERLIPIDEMIA: ICD-10-CM

## 2022-11-01 DIAGNOSIS — F19.11 HISTORY OF DRUG ABUSE IN REMISSION (HCC): Primary | ICD-10-CM

## 2022-11-01 PROCEDURE — 96372 THER/PROPH/DIAG INJ SC/IM: CPT | Performed by: INTERNAL MEDICINE

## 2022-11-01 PROCEDURE — 99213 OFFICE O/P EST LOW 20 MIN: CPT | Performed by: INTERNAL MEDICINE

## 2022-11-01 NOTE — PROGRESS NOTES
Marylee Niemann is a 28 y.o. female    Chief Complaint   Patient presents with    Follow-up     vivitrol       Visit Vitals  /83   Pulse 93   Temp 98.2 °F (36.8 °C) (Oral)   Resp 18   Ht 5' 4\" (1.626 m)   Wt 212 lb (96.2 kg)   SpO2 93%   BMI 36.39 kg/m²       3 most recent PHQ Screens 11/1/2022   PHQ Not Done -   Little interest or pleasure in doing things Nearly every day   Feeling down, depressed, irritable, or hopeless More than half the days   Total Score PHQ 2 5   Trouble falling or staying asleep, or sleeping too much Nearly every day   Feeling tired or having little energy Nearly every day   Poor appetite, weight loss, or overeating Several days   Feeling bad about yourself - or that you are a failure or have let yourself or your family down Several days   Trouble concentrating on things such as school, work, reading, or watching TV Several days   Moving or speaking so slowly that other people could have noticed; or the opposite being so fidgety that others notice Not at all   Thoughts of being better off dead, or hurting yourself in some way Not at all   PHQ 9 Score 14   How difficult have these problems made it for you to do your work, take care of your home and get along with others Somewhat difficult       Fall Risk Assessment, last 12 mths 8/29/2022   Able to walk? Yes   Fall in past 12 months? 0   Do you feel unsteady? 0   Are you worried about falling 0       Abuse Screening Questionnaire 8/29/2022   Do you ever feel afraid of your partner? N   Are you in a relationship with someone who physically or mentally threatens you? N   Is it safe for you to go home? Y       1. Have you been to the ER, urgent care clinic since your last visit? Hospitalized since your last visit? No     2. Have you seen or consulted any other health care providers outside of the [address] [place] [address] since your last visit? Include any pap smears or colon screening.   No

## 2022-11-01 NOTE — PROGRESS NOTES
Diagnoses and all orders for this visit:    1. History of drug abuse in remission [facility])    Was looking forward to completing program in January however Benadryl was found on recent drug screen and issues with healthcare monitoring organization  She is working with Dr. Heide Riley and her therapist  -     naltrexone microspheres (VIVITROL) ER injection 380 mg    2. Mixed hyperlipidemia  Not optimally controlled  Discussed with patient and she will do labs. We also discussed having an annual with her and she will schedule in December        Nexplanon  [facility] with gyn      Chief Complaint   Patient presents with    Follow-up     vivitrol       Situational stress  Had covid 2 weeks ago  She reports her sx initially covid #1  This time she felt terrible and could not get better. She used Benadryl as medication to help with her symptoms however it came positive on her blood screening  She worked for a week but came back positive. Last Monday was told not to work  She does not know if she will be completed with the program in January. This has triggered some depression and she Will see Dr. Valentino Amabile. She may get additional [address]   She is planning on using her time productively and will be finishing furniture and getting her to her car checked    Hyperlipidemia  Has not had labs done yet. Has not reported regular exercise but recent stress      Past Medical History:   Diagnosis Date    Anxiety 2010    Bee sting allergy     severe. Dr. Perlita Castellon [facility]    FH: kidney disease 11/24/2012    Lyme disease     Musculoskeletal disorder 2009    Dislocated Rt shoulder    Personal history of drug abuse ([address]     Recurrent UTI     hx pyelo. saw urology.   took macrodantin age 16 3 year     Past Surgical History:   Procedure Laterality Date    HX ROTATOR CUFF REPAIR Right 05/07/2019    HX TONSILLECTOMY  2002    MA SHOULDER SURG PROC UNLISTED Right 12/2010    torn labrum and tendon repair/right     Social History     Socioeconomic History Marital status: SINGLE   Occupational History    Occupation: [employer]   Tobacco Use    Smoking status: Never    Smokeless tobacco: Never   Vaping Use    Vaping Use: Never used   Substance and Sexual Activity    Alcohol use: Yes     Comment: no ETOH since 12/1/2017    Drug use: Yes     Types: Opiates     Comment: sober since 12/1/2017    Sexual activity: Yes     Birth control/protection: Pill   Social History Narrative    Working full time at [employer] [employer], 40 hours/week + overtime    Not doing night shifts        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 naltrexone microspheres (VivitroL) 380 mg ER injection INJECT 380MG INTRAMUSCULARLY EVERY 28 DAYS OR MONTHLY 4 Each 4    gabapentin (NEURONTIN) 300 mg capsule Take 1 Capsule by mouth two (2) times a day. Max Daily Amount: 600 mg. rx by dr. [facility] 30 Capsule 0    etonogestreL (Nexplanon) 68 mg impl by SubDERmal route. atomoxetine (STRATTERA) 40 mg capsule Take 80 mg by mouth daily. EPINEPHrine (EPIPEN) 0.3 mg/0.3 mL injection       propranolol (INDERAL) 20 mg tablet Take  by mouth as needed.  As needed        Allergies   Allergen Reactions    Bee Sting Kit Anaphylaxis     And wasps    Cefepime Hives and Swelling    Cephalosporins Anaphylaxis    Ciprofloxacin Anaphylaxis    Quinolones Anaphylaxis    Rocephin [Ceftriaxone] Hives    Zithromax [Azithromycin] Hives       Review of Systems - General ROS: negative for - chills or fever  Cardiovascular ROS: no chest pain or dyspnea on exertion  Respiratory ROS: no cough, shortness of breath, or wheezing    Visit Vitals  /83   Pulse 93   Temp 98.2 °F (36.8 °C) (Oral)   Resp 18   Ht 5' 4\" (1.626 m)   Wt 212 lb (96.2 kg)   SpO2 93%   BMI 36.39 kg/m²     Constitutional: [x] Appears well-developed and well-nourished [x] No apparent distress      [] Abnormal -     Mental status: [x] Alert and awake  [x] Oriented to person/place/time [x] Able to follow commands    [] Abnormal -     Eyes:   EOM    [x]  Normal    [] Abnormal -   Sclera  [x]  Normal    [] Abnormal -          Discharge [x]  None visible   [] Abnormal -     HENT: [x] Normocephalic, atraumatic  [] Abnormal -   [x] Mouth/Throat: Mucous membranes are moist    External Ears [x] Normal  [] Abnormal -    Neck: [x] No visualized mass [] Abnormal -     Pulmonary/Chest: [x] Respiratory effort normal   [x] No visualized signs of difficulty breathing or respiratory distress        [] Abnormal -      Musculoskeletal:   [x] Normal gait with no signs of ataxia         [x] Normal range of motion of neck        [] Abnormal -     Neurological:        [x] No Facial Asymmetry (Cranial nerve 7 motor function) (limited exam due to video visit)          [x] No gaze palsy        [] Abnormal -          Skin:        [x] No significant exanthematous lesions or discoloration noted on facial skin         [] Abnormal -            Psychiatric:       [x] Normal Affect [] Abnormal -        [x] No Hallucinations        This medical record was transcribed using an electronic medical records/speech recognition system. Although proofread, it may and can contain electronic, spelling and other errors. Corrections may be executed at a later time. Please contact us for any clarifications as needed.

## 2022-11-29 ENCOUNTER — TELEPHONE (OUTPATIENT)
Dept: INTERNAL MEDICINE CLINIC | Age: 27
End: 2022-11-29

## 2022-11-29 NOTE — TELEPHONE ENCOUNTER
I spoke with patient, she states there is an issue with her medication and shipping. It should arrive later today, pt would like to r/s. Appt r/s to Thursday 12/1 with pcp.

## 2022-11-29 NOTE — TELEPHONE ENCOUNTER
----- Message from [employer] sent at 11/29/2022  8:08 AM EST -----  Subject: Message to Provider    QUESTIONS  Information for Provider? was wanting to know if she could reschedule for   the day when her medicine will be arriving. her appointment is at 56 but   the medicine wont be there by then  ---------------------------------------------------------------------------  --------------  [address] [facility]  [phone]; OK to leave message on voicemail, OK to respond with   electronic message via [facility] portal (only for patients who have   registered [facility] account)  ---------------------------------------------------------------------------  --------------  SCRIPT ANSWERS  Relationship to Patient?  Self

## 2022-12-01 ENCOUNTER — OFFICE VISIT (OUTPATIENT)
Dept: INTERNAL MEDICINE CLINIC | Age: 27
End: 2022-12-01
Payer: COMMERCIAL

## 2022-12-01 VITALS
TEMPERATURE: 98 F | RESPIRATION RATE: 18 BRPM | HEIGHT: 64 IN | WEIGHT: 219 LBS | OXYGEN SATURATION: 98 % | BODY MASS INDEX: 37.39 KG/M2 | SYSTOLIC BLOOD PRESSURE: 124 MMHG | DIASTOLIC BLOOD PRESSURE: 94 MMHG | HEART RATE: 128 BPM

## 2022-12-01 DIAGNOSIS — F19.11 HISTORY OF DRUG ABUSE IN REMISSION (HCC): Primary | ICD-10-CM

## 2022-12-01 DIAGNOSIS — F43.9 SITUATIONAL STRESS: ICD-10-CM

## 2022-12-01 DIAGNOSIS — F41.9 ANXIETY: ICD-10-CM

## 2022-12-01 NOTE — PROGRESS NOTES
Diagnoses and all orders for this visit:    1. History of drug abuse in remission [facility])  Patient has been compliant with her office visits here as well as her Vivitrol injections  -     naltrexone microspheres (VIVITROL) ER injection 380 mg    2. Anxiety  Aggravated by situational stress  Following with psychiatry    3. Situational stress  Was recently told she had to do inpatient rehab for H   Diastolic slightly elevated will monitor  Letter written on patient's behalf. Based on her history, compliance with visits recommend she return to work      Due for Pap. Given current situation follow-up on Pap later. She is not currently having symptoms. Chief Complaint   Patient presents with    Follow-up    Anxiety     History of drug abuse and abuse in remission  Patient was recently told that she would need to do inpatient rehab. She took Benadryl for congestion, coughing and emesis from COVID. She has not been to work. She notes she is not getting paid and therefore will be needing to get another job. She is also working with her psychiatrist Dr. Dione Brown. Past Medical History:   Diagnosis Date    Anxiety 2010    Bee sting allergy     severe. Dr. Zuri Echols at [facility]    FH: kidney disease 11/24/2012    Lyme disease     Musculoskeletal disorder 2009    Dislocated Rt shoulder    Personal history of drug abuse ([address]     Recurrent UTI     hx pyelo. saw urology.   took macrodantin age [age] year     Past Surgical History:   Procedure Laterality Date    HX ROTATOR CUFF REPAIR Right 05/07/2019    HX TONSILLECTOMY  2002    VA SHOULDER SURG PROC UNLISTED Right 12/2010    torn labrum and tendon repair/right     Social History     Socioeconomic History    Marital status: SINGLE   Occupational History    Occupation: [employer]   Tobacco Use    Smoking status: Never    Smokeless tobacco: Never   Vaping Use    Vaping Use: Never used   Substance and Sexual Activity    Alcohol use: Yes     Comment: no ETOH since 12/1/2017    Drug use: Yes     Types: Opiates     Comment: sober since 12/1/2017    Sexual activity: Yes     Birth control/protection: Pill   Social History Narrative    Working full time at [employer], 40 hours/week + overtime    Not doing night shifts        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 naltrexone microspheres (VivitroL) 380 mg ER injection INJECT 380MG INTRAMUSCULARLY EVERY 28 DAYS OR MONTHLY 4 Each 4    gabapentin (NEURONTIN) 300 mg capsule Take 1 Capsule by mouth two (2) times a day. Max Daily Amount: 600 mg. rx by dr. [facility] 30 Capsule 0    etonogestreL (Nexplanon) 68 mg impl by SubDERmal route. atomoxetine (STRATTERA) 40 mg capsule Take 80 mg by mouth daily. EPINEPHrine (EPIPEN) 0.3 mg/0.3 mL injection       propranolol (INDERAL) 20 mg tablet Take  by mouth as needed.  As needed        Allergies   Allergen Reactions    Bee Sting Kit Anaphylaxis     And wasps    Cefepime Hives and Swelling    Cephalosporins Anaphylaxis    Ciprofloxacin Anaphylaxis    Quinolones Anaphylaxis    Rocephin [Ceftriaxone] Hives    Zithromax [Azithromycin] Hives         Visit Vitals  BP (!) 124/94 (BP 1 Location: Left upper arm, BP Patient Position: Sitting, BP Cuff Size: Adult)   Pulse (!) 128   Temp 98 °F (36.7 °C) (Oral)   Resp 18   Ht 5' 4\" (1.626 m)   Wt 219 lb (99.3 kg)   SpO2 98%   BMI 37.59 kg/m²     Constitutional: [x] Appears well-developed and well-nourished [x] No apparent distress      [] Abnormal -     Mental status: [x] Alert and awake  [x] Oriented to person/place/time [x] Able to follow commands    [] Abnormal -     Eyes:   EOM    [x]  Normal [] Abnormal -   Sclera  [x]  Normal    [] Abnormal -          Discharge [x]  None visible   [] Abnormal -     HENT: [x] Normocephalic, atraumatic  [] Abnormal -   [x] Mouth/Throat: Mucous membranes are moist    External Ears [x] Normal  [] Abnormal -    Neck: [x] No visualized mass [] Abnormal -     Pulmonary/Chest: [x] Respiratory effort normal   [x] No visualized signs of difficulty breathing or respiratory distress        [] Abnormal -      Musculoskeletal:   [x] Normal gait with no signs of ataxia         [x] Normal range of motion of neck        [] Abnormal -     Neurological:        [x] No Facial Asymmetry (Cranial nerve 7 motor function) (limited exam due to video visit)          [x] No gaze palsy        [] Abnormal -          Skin:        [x] No significant exanthematous lesions or discoloration noted on facial skin         [] Abnormal -            Psychiatric:       [x] Normal Affect [] Abnormal -        [x] No Hallucinations      This medical record was transcribed using an electronic medical records/speech recognition system. Although proofread, it may and can contain electronic, spelling and other errors. Corrections may be executed at a later time. Please contact us for any clarifications as needed. On this date 12/01/22  I have spent 20 minutes reviewing previous notes, test results and face to face with the patient discussing the diagnosis and importance of compliance with the treatment plan as well as documenting on the day of the visit.

## 2022-12-01 NOTE — LETTER
NOTIFICATION RETURN TO WORK / SCHOOL    12/1/2022 11:19 AM    [facility] [facility] [address] [address]      To Whom It May Concern:    Jose Nichols is currently under the care of [address] [place] [address],[address]. I am writing on behalf of Ms. Jose Nichols ([id number]). I have been Siria"s  primary care physician since  5/4/2013. At our last visit Lauren Chisholm informed me she was instructed to not return to work due to Benadryl being found on her screening which she took to treat her COVID symptoms: increased congestion, coughing and emesis. Since her last visit she has had some depression related to the incident but also because she has been held from her work. 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 Lauren Chisholm provides these patients stability, structure and helps to facilitate their growth. I strongly advocate for Lauren Chisholm to return to work in her current role as soon as possible. Lauren Chisholm is very intelligent and has  learned how to [facility] and teach  these young adults and children to learn skills to thrive in this world. Aside from helping society in this role, Lauren Chisholm also benefits from engaging in meaningful purposeful work which in turn helps her depression. I think Lauren Chisholm has a a lot of potential and her work keeps her on a positive trajectory. Please allow Lauren Chisholm to return to work as it is beneficial for her as well as society. Please feel free to contact me on my cell [phone] to discuss her case. As her internist I do believe her return to work is best for her overall.        Sincerely,      Jomar Borrero MD

## 2022-12-01 NOTE — PROGRESS NOTES
Room:  Identified pt with two pt identifiers(name and ). Reviewed record in preparation for visit and have obtained necessary documentation. Chief Complaint   Patient presents with    Follow-up    Anxiety        Vitals:    22 1034   BP: (!) 124/94   Pulse: (!) 128   Resp: 18   Temp: 98 °F (36.7 °C)   TempSrc: Oral   SpO2: 98%   Weight: 219 lb (99.3 kg)   Height: 5' 4\" (1.626 m)   PainSc:   0 - No pain       Health Maintenance Due   Topic    Hepatitis C Screening     Pap Smear     COVID-19 Vaccine (4 - Booster for Pfizer series)    Flu Vaccine (1)       1. \"Have you been to the ER, urgent care clinic since your last visit? Hospitalized since your last visit? \" No    2. \"Have you seen or consulted any other health care providers outside of the [address] [place] [address] since your last visit? \" No     3. For patients over 45: Has the patient had a colonoscopy? NA - based on age     If the patient is female:    4. For patients over 36: Has the patient had a mammogram? NA - based on age    11. For patients over 21: Has the patient had a pap smear? No    Current Outpatient Medications   Medication Instructions    atomoxetine (STRATTERA) 80 mg, Oral, DAILY    DULoxetine (CYMBALTA) 60 mg, Oral, DAILY    EPINEPHrine (EPIPEN) 0.3 mg/0.3 mL injection No dose, route, or frequency recorded.     etonogestreL (Nexplanon) 68 mg impl SubDERmal    gabapentin (NEURONTIN) 300 mg, Oral, 2 TIMES DAILY, rx by dr. [facility]    naltrexone microspheres (VivitroL) 380 mg ER injection INJECT 380MG INTRAMUSCULARLY EVERY 28 DAYS OR MONTHLY    propranolol (INDERAL) 20 mg tablet Oral, AS NEEDED, As needed       Allergies   Allergen Reactions    Bee Sting Kit Anaphylaxis     And wasps    Cefepime Hives and Swelling    Cephalosporins Anaphylaxis    Ciprofloxacin Anaphylaxis    Quinolones Anaphylaxis    Rocephin [Ceftriaxone] Hives    Zithromax [Azithromycin] Hives       Immunization History   Administered Date(s) Administered COVID-19, PFIZER PURPLE top, DILUTE for use, (age 15 y+), IM, 30mcg/0.3mL 02/02/2021, 02/24/2021, 12/11/2021    DTaP 1994, 1994, 1994, 10/31/1995, 05/07/1998    HPV 07/16/2008, 09/19/2008, 01/19/2009    Hep A Vaccine 07/16/2008, 01/19/2009    Hep B Vaccine 1994, 1994, 1994    Influenza High Dose Vaccine PF 10/16/2014    Influenza Vaccine 10/30/2016, 10/01/2017, 10/15/2018    Influenza Vaccine Whole 12/03/2012    MMR 08/09/1995, 05/07/1998    Meningococcal (MCV4) Vaccine 05/31/2012    Meningococcal ACWY Vaccine 03/01/2006    Poliovirus vaccine 1994, 1994, 10/31/1995, 05/07/1998    TB Skin Test (PPD) Intradermal 05/21/2013, 06/02/2014    Tdap 03/01/2006, 11/29/2016       Past Medical History:   Diagnosis Date    Anxiety 2010    Bee sting allergy     severe. Dr. Truman Kennedy at [facility]    FH: kidney disease 11/24/2012    Lyme disease     Musculoskeletal disorder 2009    Dislocated Rt shoulder    Personal history of drug abuse ([facility] [address]     Recurrent UTI     hx pyelo. saw urology.   took macrodantin age 16 3 year

## 2022-12-30 ENCOUNTER — OFFICE VISIT (OUTPATIENT)
Dept: INTERNAL MEDICINE CLINIC | Age: 27
End: 2022-12-30
Payer: COMMERCIAL

## 2022-12-30 VITALS
HEIGHT: 64 IN | WEIGHT: 219 LBS | SYSTOLIC BLOOD PRESSURE: 109 MMHG | HEART RATE: 80 BPM | OXYGEN SATURATION: 98 % | DIASTOLIC BLOOD PRESSURE: 77 MMHG | BODY MASS INDEX: 37.39 KG/M2 | RESPIRATION RATE: 16 BRPM | TEMPERATURE: 98 F

## 2022-12-30 DIAGNOSIS — F19.11 HISTORY OF DRUG ABUSE IN REMISSION (HCC): Primary | ICD-10-CM

## 2022-12-30 NOTE — PROGRESS NOTES
After obtaining consent, and per orders of Dr. Erika Melchor, injection of Vivtrol given by Marianne Underwood LPN. Patient instructed to remain in clinic for 20 minutes afterwards, and to report any adverse reaction to me immediately.

## 2022-12-30 NOTE — PROGRESS NOTES
Diagnoses and all orders for this visit:    1. History of drug abuse in remission (HCC)  -     naltrexone microspheres (VIVITROL) ER injection 380 mg  Stable      She will have a Pap with gynecology Dr. Kurtis Muro    Hyperlipidemia  She has the lab slip  Will do after she goes back to work      Chief Complaint   Patient presents with    Other     Follow up       History of drug abuse in remission  Since last vsiit she will likely go back to work. Scooter Poster in [facility] did SA evaluation and found did not need treatmetn  HP & P recommended group therapy and neg drug test  Will go back to work next week. She already has a negative drug test    Interested in going back for NP psychiatry    Dr. Sonia Kay we will do her Pap      Will get flu vaccine at work      Past Medical History:   Diagnosis Date    Anxiety 2010    Bee sting allergy     severe. Dr. Beatriz Cunningham at [facility]    FH: kidney disease 11/24/2012    Lyme disease     Musculoskeletal disorder 2009    Dislocated Rt shoulder    Personal history of drug abuse ([address]     Recurrent UTI     hx pyelo. saw urology.   took macrodantin age 16 3 year     Past Surgical History:   Procedure Laterality Date    HX ROTATOR CUFF REPAIR Right 05/07/2019    HX TONSILLECTOMY  2002    UT SHOULDER SURG PROC UNLISTED Right 12/2010    torn labrum and tendon repair/right     Social History     Socioeconomic History    Marital status: SINGLE   Occupational History    Occupation: [employer]   Tobacco Use    Smoking status: Never    Smokeless tobacco: Never   Vaping Use    Vaping Use: Never used   Substance and Sexual Activity    Alcohol use: Yes     Comment: no ETOH since 12/1/2017    Drug use: Yes     Types: Opiates     Comment: sober since 12/1/2017    Sexual activity: Yes     Birth control/protection: Pill   Social History Narrative    Working full time at [address], 40 hours/week + overtime    Not doing night shifts        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 naltrexone microspheres (VivitroL) 380 mg ER injection INJECT 380MG INTRAMUSCULARLY EVERY 28 DAYS OR MONTHLY 4 Each 4    gabapentin (NEURONTIN) 300 mg capsule Take 1 Capsule by mouth two (2) times a day. Max Daily Amount: 600 mg. rx by dr. [facility] 30 Capsule 0    etonogestreL (Nexplanon) 68 mg impl by SubDERmal route. atomoxetine (STRATTERA) 40 mg capsule Take 80 mg by mouth daily. EPINEPHrine (EPIPEN) 0.3 mg/0.3 mL injection       propranolol (INDERAL) 20 mg tablet Take  by mouth as needed.  As needed        Allergies   Allergen Reactions    Bee Sting Kit Anaphylaxis     And wasps    Cefepime Hives and Swelling    Cephalosporins Anaphylaxis    Ciprofloxacin Anaphylaxis    Quinolones Anaphylaxis    Rocephin [Ceftriaxone] Hives    Zithromax [Azithromycin] Hives       Review of Systems - General ROS: negative for - chills or fever  Cardiovascular ROS: no chest pain or dyspnea on exertion  Respiratory ROS: no cough, shortness of breath, or wheezing    Visit Vitals  /77 (BP 1 Location: Left upper arm, BP Patient Position: Sitting, BP Cuff Size: Adult)   Pulse 80   Temp 98 °F (36.7 °C) (Oral)   Resp 16   Ht 5' 4\" (1.626 m)   Wt 219 lb (99.3 kg)   SpO2 98%   BMI 37.59 kg/m²     Constitutional: [x] Appears well-developed and well-nourished [x] No apparent distress      [] Abnormal -     Mental status: [x] Alert and awake  [x] Oriented to person/place/time [x] Able to follow commands    [] Abnormal -     Eyes:   EOM    [x]  Normal    [] Abnormal -   Sclera  [x]  Normal    [] Abnormal -          Discharge [x]  None visible   [] Abnormal -     HENT: [x] Normocephalic, atraumatic  [] Abnormal -   [x] Mouth/Throat: Mucous membranes are moist    External Ears [x] Normal  [] Abnormal -    Neck: [x] No visualized mass [] Abnormal -     Pulmonary/Chest: [x] Respiratory effort normal   [x] No visualized signs of difficulty breathing or respiratory distress        [] Abnormal -      Musculoskeletal:   [x] Normal gait with no signs of ataxia         [x] Normal range of motion of neck        [] Abnormal -     Neurological:        [x] No Facial Asymmetry (Cranial nerve 7 motor function) (limited exam due to video visit)          [x] No gaze palsy        [] Abnormal -          Skin:        [x] No significant exanthematous lesions or discoloration noted on facial skin         [] Abnormal -            Psychiatric:       [x] Normal Affect [] Abnormal -        [x] No Hallucinations      This medical record was transcribed using an electronic medical records/speech recognition system. Although proofread, it may and can contain electronic, spelling and other errors. Corrections may be executed at a later time. Please contact us for any clarifications as needed.

## 2023-01-16 NOTE — TELEPHONE ENCOUNTER
I called pt, no answer. Mail box full I was unable to leave a message.  Pwinty Pop message sent Oculoplastic Surgeon Procedure Text (A): After obtaining clear surgical margins the patient was sent to oculoplastics for surgical repair.  The patient understands they will receive post-surgical care and follow-up from the referring physician's office.

## 2023-01-31 DIAGNOSIS — F19.10 SUBSTANCE ABUSE (HCC): ICD-10-CM

## 2023-01-31 RX ORDER — NALTREXONE 380 MG
KIT INTRAMUSCULAR
Qty: 3 EACH | Refills: 4 | Status: SHIPPED | OUTPATIENT
Start: 2023-01-31

## 2023-10-18 ENCOUNTER — OFFICE VISIT (OUTPATIENT)
Age: 28
End: 2023-10-18

## 2023-10-18 VITALS
TEMPERATURE: 98.3 F | OXYGEN SATURATION: 96 % | BODY MASS INDEX: 38.72 KG/M2 | WEIGHT: 226.8 LBS | HEART RATE: 144 BPM | RESPIRATION RATE: 14 BRPM | HEIGHT: 64 IN

## 2023-10-18 DIAGNOSIS — E78.2 MIXED HYPERLIPIDEMIA: ICD-10-CM

## 2023-10-18 DIAGNOSIS — R79.89 LOW VITAMIN D LEVEL: ICD-10-CM

## 2023-10-18 PROCEDURE — 99214 OFFICE O/P EST MOD 30 MIN: CPT | Performed by: INTERNAL MEDICINE

## 2023-10-18 RX ORDER — BUPROPION HYDROCHLORIDE 300 MG/1
TABLET ORAL
COMMUNITY
Start: 2023-10-09

## 2023-10-18 RX ORDER — EPINEPHRINE 0.3 MG/.3ML
0.3 INJECTION SUBCUTANEOUS ONCE
Qty: 0.3 ML | Refills: 0 | Status: SHIPPED | OUTPATIENT
Start: 2023-10-18 | End: 2023-10-18

## 2023-10-18 ASSESSMENT — PATIENT HEALTH QUESTIONNAIRE - PHQ9
SUM OF ALL RESPONSES TO PHQ QUESTIONS 1-9: 0
SUM OF ALL RESPONSES TO PHQ9 QUESTIONS 1 & 2: 0
SUM OF ALL RESPONSES TO PHQ QUESTIONS 1-9: 0
2. FEELING DOWN, DEPRESSED OR HOPELESS: 0
1. LITTLE INTEREST OR PLEASURE IN DOING THINGS: 0

## 2023-10-18 NOTE — PROGRESS NOTES
1. BMI 38.0-38.9,adult  Increase in weight since addition of psychiatric medications  On birth control, no history of thyroid cancer, no history of pancreatitis  Discussion with patient: Metformin versus Wegovy versus Contrave  Her colleagues have been on Wegovy with good results and was interested  -     CBC; Future  -     Comprehensive Metabolic Panel; Future  -     Lipid Panel; Future  -     TSH; Future  -     T4, Free; Future  -     Vitamin D 25 Hydroxy; Future  -     Hemoglobin A1C; Future  -     Semaglutide-Weight Management (WEGOVY) 0.25 MG/0.5ML SOAJ SC injection; Inject 0.25 mg into the skin every 7 days, Disp-2 mL, R-1Normal    Plans to transition to Mediterranean type diet  Will also start lifting weights. Her brother is planning on becoming a  and will likely train her    Discussed with patient and may need to transition off [address] and find another alternative contraception possibly IUD        2. Low vitamin D level  Interested in vitamin D check  Will cover if insurance does not  -     Vitamin D 25 Hydroxy; Future  3. Mixed hyperlipidemia  Not controlled  Does not want medication but would like diet and exercise  -     Comprehensive Metabolic Panel; Future  -     Lipid Panel;  Future       Travel to [facility] travel clinic  Can sign off on her prescriptions    Tachycardia  Check for anemia, check thyroid  Currently on Strattera--May be aggravating tachycardia and diastolic    She will follow-up with her psychiatrist and consider propanolol or beta-blocker scheduled rather than as needed  Discussed with patient she will also monitor her heart rate  Increase fluids  Discussed with patient for review appears to run higher heart rate however discussed should find underlying cause      Follow-up in 1 month or earlier if needed    Chief Complaint   Patient presents with    Travel Consult    Weight Management     Planning to go to [address]  Note increase in her weight

## 2023-10-27 ENCOUNTER — CLINICAL DOCUMENTATION (OUTPATIENT)
Age: 28
End: 2023-10-27

## 2024-10-15 NOTE — TELEPHONE ENCOUNTER
[address] [facility] called regarding Vivatrol injection needed to be mailed into our office before Pt next appt on Wed. Of next week. Needs verbal okay or order for medication today.       Please call [phone] = understands/communicates without difficulty

## 2025-04-03 ENCOUNTER — TELEPHONE (OUTPATIENT)
Facility: CLINIC | Age: 30
End: 2025-04-03

## 2025-04-03 NOTE — TELEPHONE ENCOUNTER
----- Message from Kavita MARY sent at 4/3/2025  1:57 PM EDT -----  Regarding: ECC Appointment Request  ECC Appointment Request    Patient needs appointment for ECC Appointment Type: Annual Visit.    Patient Requested Dates(s): Before May 15,2025  Patient Requested Time: Anytime  Provider Name: Ivy Degroot MD or any available providers/doctors    Reason for Appointment Request: Established Patient - Available appointments did not meet patient need  --------------------------------------------------------------------------------------------------------------------------    Relationship to Patient: Self     Call Back Information: OK to leave message on voicemail  Preferred Call Back Number: Phone [phone]